# Patient Record
Sex: FEMALE
[De-identification: names, ages, dates, MRNs, and addresses within clinical notes are randomized per-mention and may not be internally consistent; named-entity substitution may affect disease eponyms.]

---

## 2021-09-25 ENCOUNTER — HOSPITAL ENCOUNTER (EMERGENCY)
Dept: HOSPITAL 97 - ER | Age: 51
Discharge: HOME | End: 2021-09-25
Payer: COMMERCIAL

## 2021-09-25 VITALS — SYSTOLIC BLOOD PRESSURE: 123 MMHG | OXYGEN SATURATION: 100 % | DIASTOLIC BLOOD PRESSURE: 60 MMHG

## 2021-09-25 VITALS — TEMPERATURE: 98.3 F

## 2021-09-25 DIAGNOSIS — Z20.822: ICD-10-CM

## 2021-09-25 DIAGNOSIS — J20.9: ICD-10-CM

## 2021-09-25 DIAGNOSIS — G44.209: Primary | ICD-10-CM

## 2021-09-25 LAB
ALBUMIN SERPL BCP-MCNC: 3.2 G/DL (ref 3.4–5)
ALP SERPL-CCNC: 106 U/L (ref 45–117)
ALT SERPL W P-5'-P-CCNC: 24 U/L (ref 12–78)
AST SERPL W P-5'-P-CCNC: 15 U/L (ref 15–37)
BUN BLD-MCNC: 10 MG/DL (ref 7–18)
GLUCOSE SERPLBLD-MCNC: 155 MG/DL (ref 74–106)
HCT VFR BLD CALC: 33.1 % (ref 36–45)
INR BLD: 1.08
LYMPHOCYTES # SPEC AUTO: 1.9 K/UL (ref 0.7–4.9)
MAGNESIUM SERPL-MCNC: 1.8 MG/DL (ref 1.8–2.4)
MORPHOLOGY BLD-IMP: (no result)
NT-PROBNP SERPL-MCNC: 720 PG/ML (ref ?–125)
PMV BLD: 7.9 FL (ref 7.6–11.3)
POTASSIUM SERPL-SCNC: 3.8 MMOL/L (ref 3.5–5.1)
RBC # BLD: 4.03 M/UL (ref 3.86–4.86)
TROPONIN (EMERG DEPT USE ONLY): < 0.02 NG/ML (ref 0–0.04)

## 2021-09-25 PROCEDURE — 83880 ASSAY OF NATRIURETIC PEPTIDE: CPT

## 2021-09-25 PROCEDURE — 96374 THER/PROPH/DIAG INJ IV PUSH: CPT

## 2021-09-25 PROCEDURE — 83735 ASSAY OF MAGNESIUM: CPT

## 2021-09-25 PROCEDURE — 85610 PROTHROMBIN TIME: CPT

## 2021-09-25 PROCEDURE — 70450 CT HEAD/BRAIN W/O DYE: CPT

## 2021-09-25 PROCEDURE — 99285 EMERGENCY DEPT VISIT HI MDM: CPT

## 2021-09-25 PROCEDURE — 84484 ASSAY OF TROPONIN QUANT: CPT

## 2021-09-25 PROCEDURE — 80048 BASIC METABOLIC PNL TOTAL CA: CPT

## 2021-09-25 PROCEDURE — 93005 ELECTROCARDIOGRAM TRACING: CPT

## 2021-09-25 PROCEDURE — 80076 HEPATIC FUNCTION PANEL: CPT

## 2021-09-25 PROCEDURE — 36415 COLL VENOUS BLD VENIPUNCTURE: CPT

## 2021-09-25 PROCEDURE — 96361 HYDRATE IV INFUSION ADD-ON: CPT

## 2021-09-25 PROCEDURE — 81003 URINALYSIS AUTO W/O SCOPE: CPT

## 2021-09-25 PROCEDURE — 71045 X-RAY EXAM CHEST 1 VIEW: CPT

## 2021-09-25 PROCEDURE — 85025 COMPLETE CBC W/AUTO DIFF WBC: CPT

## 2021-09-25 PROCEDURE — 96375 TX/PRO/DX INJ NEW DRUG ADDON: CPT

## 2021-09-25 NOTE — RAD REPORT
EXAM DESCRIPTION:  RAD - Chest Single View - 9/25/2021 12:42 pm

 

CLINICAL HISTORY:  MALAISE

Chest pain.

 

COMPARISON:  CHEST PA AND LAT 2 VIEW dated 6/3/2010; ABDOMEN ACUTE SERIES dated 4/7/2004

 

FINDINGS:  Portable technique limits examination quality.

 

Mild bilateral interstitial lung opacities are present likely representing a viral infection or bronc
hitis. The heart is normal in size. No displaced fractures.

## 2021-09-25 NOTE — RAD REPORT
EXAM DESCRIPTION:  CT - Head Brain Wo Cont - 9/25/2021 12:15 pm

 

CLINICAL HISTORY:  HEADACHE

Hypertension, headache

 

COMPARISON:  [facility] dated 6/7/2015

 

TECHNIQUE:  All CT scans are performed using dose optimization technique as appropriate and may inclu
de automated exposure control or mA/KV adjustment according to patient size.

 

FINDINGS:  No intracranial hemorrhage, hydrocephalus or extra-axial fluid collection.No areas of brai
n edema or evidence of midline shift.

 

The paranasal sinuses and mastoids are clear. The calvarium is intact.

 

IMPRESSION:  No acute intracranial abnormality.

## 2021-09-25 NOTE — ER
Nurse's Notes                                                                                     

 [facility]                                                                 

Name: Emmanuelle Cool                                                                              

Age: 51 yrs                                                                                       

Sex: Female                                                                                       

: 1970                                                                                   

MRN: [id number]                                                                                   

Arrival Date: 2021                                                                          

Time: 11:10                                                                                       

Account#: [id number]                                                                            

Bed 7                                                                                             

Private MD:                                                                                       

Diagnosis: Episodic tension-type headache;Acute bronchitis, unspecified                           

                                                                                                  

Presentation:                                                                                     

                                                                                             

11:21 Chief complaint: Patient states: BP has been elevated the past 4 days. Awoke today with ll1 

      bad HA. Coronavirus screen: Vaccine status: Patient reports being unvaccinated. Client      

      denies travel out of the [place] in the last 14 days. At this time, the client does not        

      indicate any symptoms associated with coronavirus-19. Ebola Screen: Patient denies          

      travel to an Ebola-affected area in the 21 days before illness onset. Initial Sepsis        

      Screen: Does the patient meet any 2 criteria? HR > 90 bpm. No. Patient's initial sepsis     

      screen is negative. Does the patient have a suspected source of infection? No.              

      Patient's initial sepsis screen is negative. Risk Assessment: Do you want to hurt           

      yourself or someone else? Patient reports no desire to harm self or others. Onset of        

      symptoms was 2021.                                                            

11:21 Method Of Arrival: Ambulatory                                                           ll1 

11:21 Acuity: PABLO 3                                                                           ll1 

                                                                                                  

Historical:                                                                                       

- Allergies:                                                                                      

11:21 No Known Drug Allergies;                                                                ll1 

- PMHx:                                                                                           

11:21 Hypertension;                                                                           ll1 

                                                                                                  

- Immunization history:: Client reports having NOT received the Covid vaccine. Flu                

  vaccine status is unknown.                                                                      

- Social history:: Smoking status: Patient denies any tobacco usage or history of.                

- Family history:: not pertinent.                                                                 

                                                                                                  

                                                                                                  

Screenin:40 Abuse screen: Denies threats or abuse. Denies injuries from another. Nutritional        sv  

      screening: No deficits noted. Tuberculosis screening: No symptoms or risk factors           

      identified. Fall Risk None identified.                                                      

                                                                                                  

Assessment:                                                                                       

11:40 General: Appears in no apparent distress. comfortable, well groomed, well developed,    sv  

      Behavior is calm, cooperative, appropriate for age. Pain: Complains of pain in face and     

      scalp Pain currently is 10 out of 10 on a pain scale. Neuro: Level of Consciousness is      

      awake, alert, obeys commands, Oriented to person, place, time, situation, Moves all         

      extremities. Full function Gait is steady, Speech is normal, Facial symmetry appears        

      normal, Reports headache. Neuro: Reports dizziness, only when getting up to a standing      

      position. Cardiovascular: Patient's skin is warm and dry. Rhythm is sinus rhythm.           

      Respiratory: Airway is patent Respiratory effort is even, unlabored, Respiratory            

      pattern is regular, symmetrical. Derm: Skin is pink, warm \T\ dry. Musculoskeletal: Range   

      of motion: intact in all extremities.                                                       

13:15 Reassessment: Patient appears in no apparent distress at this time. No changes from     sv  

      previously documented assessment. Patient and/or family updated on plan of care and         

      expected duration. Pain level reassessed. Patient is alert, oriented x 3, equal             

      unlabored respirations, skin warm/dry/pink.                                                 

14:01 Reassessment: Patient appears in no apparent distress at this time. Patient and/or      sv  

      family updated on plan of care and expected duration. Pain level reassessed. Patient is     

      alert, oriented x 3, equal unlabored respirations, skin warm/dry/pink. Patient states       

      symptoms have improved.                                                                     

                                                                                                  

Vital Signs:                                                                                      

11:21  / 91; Pulse 96; Resp 16; Temp 98.3; Pulse Ox 97% on R/A; Weight 65.77 kg; Height ll1 

      4 ft. 11 in. (149.86 cm); Pain 10/10;                                                       

13:15  / 60; Pulse 84; Resp 16; Pulse Ox 100% ;                                         sv  

11:21 Body Mass Index 29.29 (65.77 kg, 149.86 cm)                                             ll1 

                                                                                                  

ED Course:                                                                                        

11:10 Patient arrived in ED.                                                                  ds1 

11:20 Carmen Soni MD is Attending Physician.                                           ma2 

11:21 Arm band placed on Patient placed in an exam room, on a stretcher.                      ll1 

11:23 Triage completed.                                                                       ll1 

11:26 Mame Milner RN is Primary Nurse.                                                  sv  

11:40 Patient has correct armband on for positive identification. Bed in low position. Call   sv  

      light in reach. Cardiac monitor on. Pulse ox on. NIBP on. Door closed. Head of bed          

      elevated.                                                                                   

11:40 Inserted saline lock: 20 gauge in right antecubital area, using aseptic technique.      sv  

      ,using aseptic technique. done by Terernce MASSEY Blood collected.                                 

11:44 EKG done, by ED staff, reviewed by Carmen Soni MD.                                 sv  

12:15 CT Head Brain wo Cont In Process Unspecified.                                           EDMS

12:42 XRAY Chest (1 view) In Process Unspecified.                                             EDMS

14:01 No provider procedures requiring assistance completed. IV discontinued, intact,         sv  

      bleeding controlled, No redness/swelling at site. Pressure dressing applied.                

                                                                                                  

Administered Medications:                                                                         

11:48 Drug: NS 0.9% 1000 ml Route: IV; Rate: 1 bolus; Site: right forearm;                    sv  

13:13 Follow up: Response: No adverse reaction; IV Status: Completed infusion; IV Intake:     sv  

      1000ml                                                                                      

11:48 Drug: Reglan (metoCLOPramide) 20 mg Route: IVP; Site: right forearm;                    sv  

12:30 Follow up: Response: No adverse reaction                                                sv  

11:48 Drug: Ketorolac 30 mg Route: IVP; Site: right forearm;                                  sv  

12:30 Follow up: Response: No adverse reaction                                                sv  

14:01 Drug: AZITHromycin 500 mg Route: PO;                                                    sv  

14:01 Follow up: Response: Medication administered at discharge.                              sv  

                                                                                                  

                                                                                                  

Intake:                                                                                           

13:13 IV: 1000ml; Total: 1000ml.                                                              sv  

                                                                                                  

Outcome:                                                                                          

13:35 Discharge ordered by MD. barnett 

14:02 Discharged to home ambulatory.                                                          sv  

14:02 Condition: stable                                                                           

14:02 Discharge instructions given to patient, Instructed on discharge instructions, follow       

      up and referral plans. medication usage, Demonstrated understanding of instructions,        

      follow-up care, medications, Prescriptions given X 2.                                       

14:02 Patient left the ED.                                                                    sv  

                                                                                                  

Signatures:                                                                                       

Dispatcher [employer]                           Mame Jacome, RN                    RN   Ami Burnett                                ds1                                                  

Carmen Soni MD MD ma2 Lewis, Lynsay, RN RN   ll1                                                  

                                                                                                  

**************************************************************************************************

## 2021-09-25 NOTE — EDPHYS
Physician Documentation                                                                           

 [facility]                                                                 

Name: Emmanuelle Cool                                                                              

Age: 51 yrs                                                                                       

Sex: Female                                                                                       

: 1970                                                                                   

MRN: [id number]                                                                                   

Arrival Date: 2021                                                                          

Time: 11:10                                                                                       

Account#: [id number]                                                                            

Bed 7                                                                                             

Private MD:                                                                                       

ED Physician Carmen Soni                                                                    

HPI:                                                                                              

                                                                                             

11:32 This 51 yrs old  Female presents to ER via Ambulatory with complaints of Blood  ma2 

      Pressue Issue, Dizziness.                                                                   

11:32 The patient presents with mild headache. Onset: The symptoms/episode began/occurred     ma2 

      gradually, 1 day(s) ago. Associated signs and symptoms: Pertinent negatives: blurred        

      vision, confusion, focal weakness, head injury, numbness, palpitations, syncope.            

      Severity of symptoms: At their worst the symptoms were moderate in the emergency            

      department the symptoms are unchanged. The patient has experienced similar episodes in      

      the past.                                                                                   

                                                                                                  

Historical:                                                                                       

- Allergies:                                                                                      

11:21 No Known Drug Allergies;                                                                ll1 

- PMHx:                                                                                           

11:21 Hypertension;                                                                           ll1 

                                                                                                  

- Immunization history:: Client reports having NOT received the Covid vaccine. Flu                

  vaccine status is unknown.                                                                      

- Social history:: Smoking status: Patient denies any tobacco usage or history of.                

- Family history:: not pertinent.                                                                 

                                                                                                  

                                                                                                  

ROS:                                                                                              

11:32 Constitutional: Negative for fever, chills, and weight loss.                            ma2 

11:32 All other systems are negative.                                                             

                                                                                                  

Exam:                                                                                             

11:32 Constitutional:  This is a well developed, well nourished patient who is awake, alert,  ma2 

      and in no acute distress. Head/Face:  Normocephalic, atraumatic. Eyes:  Pupils equal        

      round and reactive to light, extra-ocular motions intact.  Lids and lashes normal.          

      Conjunctiva and sclera are non-icteric and not injected.  Cornea within normal limits.      

      Periorbital areas with no swelling, redness, or edema. ENT:  Nares patent. No nasal         

      discharge, no septal abnormalities noted.  Tympanic membranes are normal and external       

      auditory canals are clear.  Oropharynx with no redness, swelling, or masses, exudates,      

      or evidence of obstruction, uvula midline.  Mucous membranes moist. Neck:  Trachea          

      midline, no thyromegaly or masses palpated, and no cervical lymphadenopathy.  Supple,       

      full range of motion without nuchal rigidity, or vertebral point tenderness.  No            

      Meningismus. Chest/axilla:  Normal chest wall appearance and motion.  Nontender with no     

      deformity.  No lesions are appreciated. Cardiovascular:  Regular rate and rhythm with a     

      normal S1 and S2.  No gallops, murmurs, or rubs.  Normal PMI, no JVD.  No pulse             

      deficits. Respiratory:  Lungs have equal breath sounds bilaterally, clear to                

      auscultation and percussion.  No rales, rhonchi or wheezes noted.  No increased work of     

      breathing, no retractions or nasal flaring. Abdomen/GI:  Soft, non-tender, with normal      

      bowel sounds.  No distension or tympany.  No guarding or rebound.  No evidence of           

      tenderness throughout. Back:  No spinal tenderness.  No costovertebral tenderness.          

      Full range of motion. MS/ Extremity:  Pulses equal, no cyanosis.  Neurovascular intact.     

       Full, normal range of motion. Neuro:  Awake and alert, GCS 15, oriented to person,         

      place, time, and situation.  Cranial nerves II-XII grossly intact.  Motor strength 5/5      

      in all extremities.  Sensory grossly intact.  Cerebellar exam normal.  Normal gait.         

                                                                                                  

Vital Signs:                                                                                      

11:21  / 91; Pulse 96; Resp 16; Temp 98.3; Pulse Ox 97% on R/A; Weight 65.77 kg; Height ll1 

      4 ft. 11 in. (149.86 cm); Pain 10/10;                                                       

13:15  / 60; Pulse 84; Resp 16; Pulse Ox 100% ;                                         sv  

11:21 Body Mass Index 29.29 (65.77 kg, 149.86 cm)                                             ll1 

                                                                                                  

MDM:                                                                                              

11:20 Patient medically screened.                                                             ma2 

11:32 Differential diagnosis: generalized weakness, hyperventilation, hypovolemia, idiopathic ma2 

      dizziness, near-syncope, vertigo.                                                           

13:35 Data reviewed: vital signs, nurses notes. Counseling: I had a detailed discussion with  ma2 

      the patient and/or guardian regarding: the historical points, exam findings, and any        

      diagnostic results supporting the discharge/admit diagnosis, the presence of at least       

      one elevated blood pressure reading (>120/80) during this emergency department visit,       

      the need for outpatient follow up. Response to treatment: the patient's symptoms have       

      markedly improved after treatment.                                                          

                                                                                                  

                                                                                             

11:31 Order name: Basic Metabolic Panel; Complete Time: 12:24                                 [facility] 

                                                                                             

11:31 Order name: CBC with Diff; Complete Time: 13:03                                         [facility] 

                                                                                             

11:31 Order name: LFT's; Complete Time: 12:24                                                 [facility] 

                                                                                             

11:31 Order name: Magnesium; Complete Time: 12:24                                             [facility] 

                                                                                             

11:31 Order name: NT PRO-BNP; Complete Time: 12:24                                            [facility] 

                                                                                             

11:31 Order name: PT-INR; Complete Time: 12:24                                                [facility] 

                                                                                             

11:31 Order name: CT Head Brain wo Cont; Complete Time: 13:03                                 [facility] 

                                                                                             

11:31 Order name: Troponin (emerg Dept Use Only); Complete Time: 12:24                        [facility] 

                                                                                             

11:31 Order name: XRAY Chest (1 view); Complete Time: 13:03                                   [facility] 

                                                                                             

12:47 Order name: Manual Differential; Complete Time: 13:03                                   [facility]

                                                                                             

12:59 Order name: SARS-COV-2 RT PCR; Complete Time: 13:03                                     [facility]

                                                                                             

13:12 Order name: Urine Dipstick-Ancillary                                                    [facility]

                                                                                             

11:31 Order name: EKG; Complete Time: 11:32                                                   [facility] 

                                                                                             

11:31 Order name: Cardiac monitoring; Complete Time: 11:48                                    [facility] 

                                                                                             

11:31 Order name: EKG - Nurse/Tech; Complete Time: 11:48                                      [facility] 

                                                                                             

11:31 Order name: IV Saline Lock; Complete Time: 11:48                                        [facility] 

                                                                                             

11:31 Order name: Labs collected and sent; Complete Time: 11:48                               [facility] 

                                                                                             

11:31 Order name: O2 Per Protocol; Complete Time: 11:49                                       [facility] 

                                                                                             

11:31 Order name: O2 Sat Monitoring; Complete Time: 11:49                                     [facility] 

                                                                                             

11:31 Order name: Urine Dipstick-Ancillary (obtain specimen); Complete Time: 13:13            ma 

                                                                                                  

Administered Medications:                                                                         

11:48 Drug: NS 0.9% 1000 ml Route: IV; Rate: 1 bolus; Site: right forearm;                    sv  

13:13 Follow up: Response: No adverse reaction; IV Status: Completed infusion; IV Intake:     sv  

      1000ml                                                                                      

11:48 Drug: Reglan (metoCLOPramide) 20 mg Route: IVP; Site: right forearm;                    sv  

12:30 Follow up: Response: No adverse reaction                                                sv  

11:48 Drug: Ketorolac 30 mg Route: IVP; Site: right forearm;                                  sv  

12:30 Follow up: Response: No adverse reaction                                                sv  

14:01 Drug: AZITHromycin 500 mg Route: PO;                                                    sv  

14:01 Follow up: Response: Medication administered at discharge.                              sv  

                                                                                                  

                                                                                                  

Disposition Summary:                                                                              

21 13:35                                                                                    

Discharge Ordered                                                                                 

      Location: Home                                                                          ma2 

      Condition: Stable                                                                       ma2 

      Diagnosis                                                                                   

        - Episodic tension-type headache                                                      ma2 

        - Acute bronchitis, unspecified                                                       ma2 

      Followup:                                                                               ma2 

        - With: Private Physician                                                                  

        - When: Tomorrow                                                                           

        - Reason: Continuance of care                                                              

      Discharge Instructions:                                                                     

        - Discharge Summary Sheet                                                             ma2 

        - Acute Bronchitis, Adult                                                             ma2 

      Forms:                                                                                      

        - Medication Reconciliation Form                                                      ma2 

        - Thank You Letter                                                                    ma2 

        - Antibiotic Education                                                                ma2 

        - Prescription Opioid Use                                                             ma2 

      Prescriptions:                                                                              

        - Reglan 10 mg Oral Tablet                                                                 

            - take 1 tablet by ORAL route every 6 hours . take 30 minutes before meals and at ma2 

      bedtime; 100 tablet; Refills: 0, Product Selection Permitted                                

        - Zithromax Z-Venancio 250 mg Oral Tablet                                                       

            - take 1 tablet by ORAL route as directed for 5 days Day 1 - take two (2) tablets ma2 

      one time.  Day 2, 3, 4 , 5 take one (1) tablet once daily.; 6 tablet; Refills: 0,           

      Product Selection Permitted                                                                 

Signatures:                                                                                       

Dispatcher [employer]                           Mame Jacome RN                    Carmen De La Rosa MD MD   ma2                                                  

Edison Murcia RN                       RN   ll1                                                  

                                                                                                  

Corrections: (The following items were deleted from the chart)                                    

12:05 11:32 CORONAVIRUS+MR.LAB.BRZ ordered. EDMS                                              EDMS

                                                                                                  

**************************************************************************************************

## 2021-10-03 ENCOUNTER — HOSPITAL ENCOUNTER (EMERGENCY)
Dept: HOSPITAL 97 - ER | Age: 51
Discharge: HOME | End: 2021-10-03
Payer: COMMERCIAL

## 2021-10-03 VITALS — SYSTOLIC BLOOD PRESSURE: 126 MMHG | OXYGEN SATURATION: 100 % | DIASTOLIC BLOOD PRESSURE: 68 MMHG | TEMPERATURE: 96.8 F

## 2021-10-03 DIAGNOSIS — F41.9: Primary | ICD-10-CM

## 2021-10-03 LAB
ALBUMIN SERPL BCP-MCNC: 3.3 G/DL (ref 3.4–5)
ALP SERPL-CCNC: 102 U/L (ref 45–117)
ALT SERPL W P-5'-P-CCNC: 27 U/L (ref 12–78)
AST SERPL W P-5'-P-CCNC: 15 U/L (ref 15–37)
BUN BLD-MCNC: 15 MG/DL (ref 7–18)
GLUCOSE SERPLBLD-MCNC: 114 MG/DL (ref 74–106)
HCT VFR BLD CALC: 32.5 % (ref 36–45)
INR BLD: 1.09
LYMPHOCYTES # SPEC AUTO: 2.5 K/UL (ref 0.7–4.9)
MAGNESIUM SERPL-MCNC: 2 MG/DL (ref 1.8–2.4)
METHAMPHET UR QL SCN: NEGATIVE
NT-PROBNP SERPL-MCNC: 266 PG/ML (ref ?–125)
PMV BLD: 8 FL (ref 7.6–11.3)
POTASSIUM SERPL-SCNC: 3.5 MMOL/L (ref 3.5–5.1)
RBC # BLD: 3.98 M/UL (ref 3.86–4.86)
THC SERPL-MCNC: NEGATIVE NG/ML
TROPONIN (EMERG DEPT USE ONLY): < 0.02 NG/ML (ref 0–0.04)

## 2021-10-03 PROCEDURE — 36415 COLL VENOUS BLD VENIPUNCTURE: CPT

## 2021-10-03 PROCEDURE — 83880 ASSAY OF NATRIURETIC PEPTIDE: CPT

## 2021-10-03 PROCEDURE — 70450 CT HEAD/BRAIN W/O DYE: CPT

## 2021-10-03 PROCEDURE — 80307 DRUG TEST PRSMV CHEM ANLYZR: CPT

## 2021-10-03 PROCEDURE — 99284 EMERGENCY DEPT VISIT MOD MDM: CPT

## 2021-10-03 PROCEDURE — 83735 ASSAY OF MAGNESIUM: CPT

## 2021-10-03 PROCEDURE — 80076 HEPATIC FUNCTION PANEL: CPT

## 2021-10-03 PROCEDURE — 81003 URINALYSIS AUTO W/O SCOPE: CPT

## 2021-10-03 PROCEDURE — 84484 ASSAY OF TROPONIN QUANT: CPT

## 2021-10-03 PROCEDURE — 82550 ASSAY OF CK (CPK): CPT

## 2021-10-03 PROCEDURE — 96375 TX/PRO/DX INJ NEW DRUG ADDON: CPT

## 2021-10-03 PROCEDURE — 71045 X-RAY EXAM CHEST 1 VIEW: CPT

## 2021-10-03 PROCEDURE — 96374 THER/PROPH/DIAG INJ IV PUSH: CPT

## 2021-10-03 PROCEDURE — 80048 BASIC METABOLIC PNL TOTAL CA: CPT

## 2021-10-03 PROCEDURE — 85025 COMPLETE CBC W/AUTO DIFF WBC: CPT

## 2021-10-03 PROCEDURE — 85610 PROTHROMBIN TIME: CPT

## 2021-10-03 PROCEDURE — 93005 ELECTROCARDIOGRAM TRACING: CPT

## 2021-10-03 NOTE — RAD REPORT
EXAM DESCRIPTION:  RAD - Chest Single View - 10/3/2021 6:30 pm

 

CLINICAL HISTORY:  dizziness

 

COMPARISON:  Chest Single View dated 9/25/2021; CHEST PA AND LAT 2 VIEW dated 6/3/2010; ABDOMEN ACUTE
 SERIES dated 4/7/2004

 

FINDINGS:  Lines: None.

Lungs: No evidence of edema or pneumonia.

Pleural: No significant pleural effusions or pneumothorax.

Cardiac: The heart size is within normal limits.

Bones: No acute fractures.

Other:

 

IMPRESSION:  No acute cardiopulmonary disease.

## 2021-10-03 NOTE — ER
Nurse's Notes                                                                                     

 [facility]                                                                 

Name: Emmanuelle Cool                                                                              

Age: 51 yrs                                                                                       

Sex: Female                                                                                       

: 1970                                                                                   

MRN: [id number]                                                                                   

Arrival Date: 10/03/2021                                                                          

Time: 17:59                                                                                       

Account#: [id number]                                                                            

Bed 15                                                                                            

Private MD:                                                                                       

Diagnosis: Anxiety disorder, unspecified                                                          

                                                                                                  

Presentation:                                                                                     

10/03                                                                                             

18:15 Chief complaint: Patient states: Reports episode of anxiety while washing her car.      aj2 

      Reports hot flash prior to feeling anxiety.. Coronavirus screen: Vaccine status:            

      Patient reports being unvaccinated. Ebola Screen: No symptoms or risks identified at        

      this time. Initial Sepsis Screen: Does the patient meet any 2 criteria?. Risk               

      Assessment: Do you want to hurt yourself or someone else?. Onset of symptoms was            

      October 3, 2021.                                                                            

18:15 Method Of Arrival: EMS: [facility]                                                       aj2 

18:15 Acuity: PABLO 2                                                                           aj2 

22:41 Initial Sepsis Screen: Does the patient have a suspected source of infection? No.       lh3 

      Patient's initial sepsis screen is negative.                                                

                                                                                                  

Triage Assessment:                                                                                

18:18 General: Appears in no apparent distress. comfortable, Behavior is calm, cooperative.   aj2 

      Pain: Denies pain.                                                                          

                                                                                                  

OB/GYN:                                                                                           

22:41 LMP N/A - Irregular menses                                                              lh3 

                                                                                                  

Historical:                                                                                       

- PMHx:                                                                                           

18:18 Hypertension;                                                                           aj2 

                                                                                                  

- Immunization history:: Adult Immunizations not up to date.                                      

- Social history:: Smoking status: unknown.                                                       

                                                                                                  

                                                                                                  

Screenin:49 Abuse screen: Denies threats or abuse. Denies injuries from another. Nutritional        aj2 

      screening: No deficits noted. Tuberculosis screening: No symptoms or risk factors           

      identified. Fall Risk None identified.                                                      

                                                                                                  

Assessment:                                                                                       

18:49 Reassessment: Patient appears in no apparent distress at this time. Patient and/or      aj2 

      family updated on plan of care and expected duration. Pain level reassessed. Patient is     

      alert, oriented x 3, equal unlabored respirations, skin warm/dry/pink.                      

                                                                                                  

Vital Signs:                                                                                      

18:46  / 68; Pulse 87; Resp 18; Temp 96.8; Pulse Ox 100% ;                              aj2 

                                                                                                  

ED Course:                                                                                        

17:59 Patient arrived in ED.                                                                  em1 

18:04 Aubrey Flanagan PA is PHCP.                                                                cp  

18:09 Aubrey Champion MD is Attending Physician.                                             pallavi 

18:15 Kip Cassidy is Primary Nurse.                                                      aj2 

18:18 Triage completed.                                                                       aj2 

18:18 Arm band placed on right wrist.                                                         aj2 

18:30 XRAY Chest (1 view) In Process Unspecified.                                             EDMS

18:31 CT Head Brain wo Cont In Process Unspecified.                                           EDMS

18:49 No apparent distress. Resting quietly.                                                  aj2 

18:49 Patient has correct armband on for positive identification.                             aj2 

18:49 No provider procedures requiring assistance completed. IV is patent, is intact.         aj2 

18:59 CBC with Diff Sent.                                                                     aj2 

18:59 LFT's Sent.                                                                             aj2 

18:59 Magnesium Sent.                                                                         aj2 

18:59 NT PRO-BNP Sent.                                                                        aj2 

18:59 PT-INR Sent.                                                                            aj2 

18:59 Troponin (emerg Dept Use Only) Sent.                                                    aj2 

19:00 Basic Metabolic Panel Sent.                                                             aj2 

22:41 IV discontinued, bleeding controlled, No redness/swelling at site. Pressure dressing    lh3 

      applied.                                                                                    

                                                                                                  

Administered Medications:                                                                         

19:00 Drug: NS 0.9% 1000 ml Route: IV; Rate: 1 bolus; Site: left antecubital;                 aj2 

19:16 Drug: Ativan (LORazepam) 0.5 mg Route: IVP; Site: left antecubital;                     aj2 

20:05 Follow up: Response: No adverse reaction                                                lh3 

19:16 Drug: Zofran (Ondansetron) 4 mg Route: IVP; Site: left antecubital;                     aj2 

20:05 Follow up: Response: No adverse reaction                                                lh3 

21:10 Follow up: Response: No adverse reaction                                                3 

                                                                                                  

                                                                                                  

Outcome:                                                                                          

22:13 Discharge ordered by MD.                                                                maxx  

22:41 Discharged to home ambulatory.                                                          3 

22:41 Condition: good                                                                             

22:41 Discharge instructions given to patient, Instructed on discharge instructions,              

      medication usage, Demonstrated understanding of instructions, Prescriptions given X 1.      

22:42 Patient left the ED.                                                                    3 

                                                                                                  

Signatures:                                                                                       

Dispatcher [employer]                           EDMS                                                 

Aubrey Champion MD MD cha Martinez, Eric                               em1                                                  

Aubrey Flanagan PA PA cp Hardee, Latisha, RN                     RN   3                                                  

Kip Cassidy                             aj2                                                  

                                                                                                  

**************************************************************************************************

## 2021-10-03 NOTE — EDPHYS
Physician Documentation                                                                           

 [facility]                                                                 

Name: Emmanuelle Cool                                                                              

Age: 51 yrs                                                                                       

Sex: Female                                                                                       

: 1970                                                                                   

MRN: [id number]                                                                                   

Arrival Date: 10/03/2021                                                                          

Time: 17:59                                                                                       

Account#: [id number]                                                                            

Bed 15                                                                                            

Private MD:                                                                                       

ED Physician Aubrey Champion                                                                      

HPI:                                                                                              

10/03                                                                                             

18:20 This 51 yrs old  Female presents to ER via EMS with complaints of Anxiety.      cp  

18:20 The patient presents with lightheadedness. Onset: The symptoms/episode began/occurred   cp  

      today.                                                                                      

18:20 Associated signs and symptoms: Pertinent positives: general weakness, Pertinent         cp  

      negatives: abdominal pain, chest pain, focal weakness, near-syncope, numbness, syncope.     

      Patient's baseline: Neuro: alert and fully oriented, Motor: no deficits, Ambulation:        

      walks without assistance, Speech: normal. Patient reports she was outside washing her       

      car at local [employer] when she started becoming lightheaded, weak all over. Patient          

      reports history of anxiety.                                                                 

                                                                                                  

OB/GYN:                                                                                           

22:41 LMP N/A - Irregular menses                                                              lh3 

                                                                                                  

Historical:                                                                                       

- PMHx:                                                                                           

18:18 Hypertension;                                                                           aj2 

                                                                                                  

- Immunization history:: Adult Immunizations not up to date.                                      

- Social history:: Smoking status: unknown.                                                       

                                                                                                  

                                                                                                  

ROS:                                                                                              

18:25 Constitutional: Negative for body aches, chills, fever, poor PO intake.                 cp  

18:25 Eyes: Negative for injury, pain, redness, and discharge.                                cp  

18:25 ENT: Negative for ear pain, sore throat, difficulty swallowing, difficulty handling         

      secretions.                                                                                 

18:25 Cardiovascular: Negative for chest pain, edema, palpitations.                               

18:25 Respiratory: Negative for cough, shortness of breath, wheezing.                             

18:25 Abdomen/GI: Negative for abdominal pain, vomiting, diarrhea, constipation.                  

18:25 Neuro: Positive for dizziness, weakness all over, Negative for altered mental status,       

      headache, loss of consciousness, numbness, syncope.                                         

18:25 Psych: Positive for anxiety.                                                                

18:25 All other systems are negative.                                                             

                                                                                                  

Exam:                                                                                             

18:30 Constitutional: The patient appears in no acute distress, alert, awake,                 cp  

      non-diaphoretic, non-toxic, well developed, well nourished, anxious.                        

18:30 Head/Face:  Normocephalic, atraumatic.                                                  cp  

18:30 Eyes: Periorbital structures: appear normal, Pupils: equal, round, and reactive to          

      light and accomodation, Extraocular movements: intact throughout, Conjunctiva: normal,      

      no exudate, no injection, Sclera: no appreciated abnormality, Lids and lashes: appear       

      normal, bilaterally.                                                                        

18:30 ENT: External ear(s): are unremarkable, Ear canal(s): are normal, clear, TM's:              

      dullness, bilaterally, Nose: is normal, Mouth: Lips: moist, Oral mucosa: pink and           

      intact, moist, Posterior pharynx: Airway: no evidence of obstruction, patent.               

18:30 Neck: ROM/movement: is normal, is supple, without pain, no range of motions                 

      limitations, no nuchal rigidity.                                                            

18:30 Chest/axilla: Inspection: normal, Palpation: is normal, no crepitus, no tenderness.         

18:30 Cardiovascular: Rate: normal, Rhythm: regular, Heart sounds: murmur, not appreciated,       

      Edema: is not appreciated, JVD: is not appreciated.                                         

18:30 Respiratory: the patient does not display signs of respiratory distress,  Respirations:     

      normal, no use of accessory muscles, no retractions, labored breathing, is not present,     

      Breath sounds: are clear throughout, no decreased breath sounds, no stridor, no             

      wheezing.                                                                                   

18:30 Abdomen/GI: Inspection: abdomen appears normal, Palpation: abdomen is soft and              

      non-tender, in all quadrants.                                                               

18:30 Back: pain, is absent, ROM is normal.                                                       

18:30 Neuro: Orientation: to person, place \T\ time. Mentation: is normal, Cerebellar function:   

      is grossly normal, Motor: moves all fours, strength is normal, Sensation: is normal.        

20:37 ECG was reviewed by the Attending Physician.                                            cp  

                                                                                                  

Vital Signs:                                                                                      

18:46  / 68; Pulse 87; Resp 18; Temp 96.8; Pulse Ox 100% ;                              aj2 

                                                                                                  

MDM:                                                                                              

18:09 Patient medically screened.                                                             pallavi 

18:30 Differential diagnosis: hypovolemia, TIA, dehydration, anxiety.                         cp  

22:10 Data reviewed: vital signs, nurses notes, lab test result(s), EKG, radiologic studies,  cp  

      CT scan, plain films.                                                                       

22:10 Test interpretation: by ED physician or midlevel provider: ECG, plain radiologic        cp  

      studies.                                                                                    

22:13 Counseling: I had a detailed discussion with the patient and/or guardian regarding: the cp  

      historical points, exam findings, and any diagnostic results supporting the                 

      discharge/admit diagnosis, lab results, radiology results, to return to the emergency       

      department if symptoms worsen or persist or if there are any questions or concerns that     

      arise at home.                                                                              

22:13 Response to treatment: the patient's symptoms have markedly improved after treatment,   cp  

      VSS. Patient reports symptoms markedly improved. Will discharge to home for continued       

      monitoring.                                                                                 

                                                                                                  

10/03                                                                                             

18:11 Order name: Basic Metabolic Panel                                                       cp  

10/03                                                                                             

18:11 Order name: CBC with Diff; Complete Time: 19:36                                         cp  

10/03                                                                                             

19:36 Interpretation: Normal except: HGB 10.6; HCT 32.5; MCH 26.7; MN% 13.6.                  cp  

10/03                                                                                             

18:11 Order name: LFT's; Complete Time: 19:36                                                 cp  

10/03                                                                                             

19:37 Interpretation: Normal except: ALB 3.3; GLOB 3.7; A/G 0.9.                              cp  

10/03                                                                                             

18:11 Order name: Magnesium; Complete Time: 19:36                                             cp  

10/03                                                                                             

18:11 Order name: NT PRO-BNP; Complete Time: 19:36                                            cp  

10/03                                                                                             

18:11 Order name: PT-INR; Complete Time: 19:36                                                cp  

10/03                                                                                             

20:09 Interpretation: Abnormal: PT 12.6.                                                      cp  

10/03                                                                                             

18:11 Order name: Troponin (emerg Dept Use Only); Complete Time: 19:36                        cp  

10/03                                                                                             

18:11 Order name: XRAY Chest (1 view); Complete Time: 19:36                                   cp  

10/03                                                                                             

18:11 Order name: CT Head Brain wo Cont; Complete Time: 18:48                                 cp  

10/03                                                                                             

18:11 Order name: CK; Complete Time: 19:36                                                    cp  

10/03                                                                                             

18:11 Order name: UDS; Complete Time: 22:05                                                   cp  

10/03                                                                                             

18:12 Order name: Basic Metabolic Panel; Complete Time: 19:36                                 EDMS

10/03                                                                                             

19:37 Interpretation: Normal except: ; GLUC 114; CRE 0.38.                              cp  

10/03                                                                                             

20:49 Order name: Urine Dipstick-Ancillary; Complete Time: 22:05                              EDMS

10/03                                                                                             

18:11 Order name: EKG; Complete Time: 18:13                                                   cp  

10/03                                                                                             

18:11 Order name: Cardiac monitoring; Complete Time: 19:00                                    cp  

10/03                                                                                             

18:11 Order name: EKG - Nurse/Tech; Complete Time: 20:46                                      cp  

10/03                                                                                             

18:11 Order name: IV Saline Lock; Complete Time: 18:59                                        cp  

10/03                                                                                             

18:11 Order name: Labs collected and sent; Complete Time: 18:59                               cp  

10/03                                                                                             

18:11 Order name: O2 Per Protocol; Complete Time: 18:59                                       cp  

10/03                                                                                             

18:11 Order name: O2 Sat Monitoring; Complete Time: 18:59                                     cp  

10/03                                                                                             

18:11 Order name: Urine Dipstick-Ancillary (obtain specimen); Complete Time: 21:10            cp  

10/03                                                                                             

18:11 Order name: Urine Pregnancy Test (obtain specimen); Complete Time: 21:11                cp  

                                                                                                  

EC:37 Rate is 87 beats/min. Rhythm is regular. [place] interval is normal. QRS interval is normal. cp  

      QT interval is normal. T waves are Inverted in lead aVR. Interpreted by me. Reviewed by     

      me.                                                                                         

                                                                                                  

Administered Medications:                                                                         

19:00 Drug: NS 0.9% 1000 ml Route: IV; Rate: 1 bolus; Site: left antecubital;                 aj2 

19:16 Drug: Ativan (LORazepam) 0.5 mg Route: IVP; Site: left antecubital;                     aj2 

20:05 Follow up: Response: No adverse reaction                                                lh3 

19:16 Drug: Zofran (Ondansetron) 4 mg Route: IVP; Site: left antecubital;                     aj2 

20:05 Follow up: Response: No adverse reaction                                                lh3 

21:10 Follow up: Response: No adverse reaction                                                lh3 

                                                                                                  

                                                                                                  

Disposition:                                                                                      

10/04                                                                                             

06:41 Co-signature as Attending Physician, Aubrey Champion MD I agree with the assessment and  pallavi 

      plan of care.                                                                               

                                                                                                  

Disposition Summary:                                                                              

10/03/21 22:13                                                                                    

Discharge Ordered                                                                                 

      Location: Home                                                                          cp  

      Problem: an acute exacerbation                                                          cp  

      Symptoms: have improved                                                                 cp  

      Condition: Stable                                                                       cp  

      Diagnosis                                                                                   

        - Anxiety disorder, unspecified                                                       cp  

      Followup:                                                                               cp  

        - With: Private Physician                                                                  

        - When: 1 - 2 days                                                                         

        - Reason: Recheck today's complaints                                                       

      Discharge Instructions:                                                                     

        - Discharge Summary Sheet                                                             cp  

        - Generalized Anxiety Disorder, Adult                                                 cp  

        - Form - Return To Work                                                               lh3 

      Forms:                                                                                      

        - Medication Reconciliation Form                                                      cp  

        - Thank You Letter                                                                    cp  

        - Antibiotic Education                                                                cp  

        - Prescription Opioid Use                                                             cp  

      Prescriptions:                                                                              

        - Vistaril 50 mg Oral capsule                                                              

            - take 1 capsule by ORAL route 4 times per day; 20 capsule; Refills: 0, Product   cp  

      Selection Permitted                                                                         

Signatures:                                                                                       

Dispatcher [employer]                           Aubrey Paulino MD MD cha Page, Corey, PA PA cp Hardee, Latisha, RN                     RN   3                                                  

Kip Cassidy                             [facility]                                                  

                                                                                                  

**************************************************************************************************

## 2021-10-03 NOTE — RAD REPORT
EXAM DESCRIPTION:  CT - Head Brain Wo Cont - 10/3/2021 6:31 pm

 

CLINICAL HISTORY:  DIZZINESS

 

COMPARISON:  <Comparisons>

 

TECHNIQUE:  All CT scans are performed using dose optimization technique as appropriate and may inclu
de automated exposure control or mA/KV adjustment according to patient size.

 

FINDINGS:  No intracranial hemorrhage, hydrocephalus or extra-axial fluid collection.No areas of brai
n edema or evidence of midline shift.

 

The paranasal sinuses and mastoids are clear. The calvarium is intact.

 

IMPRESSION:  No acute intracranial abnormality.

## 2021-12-08 ENCOUNTER — HOSPITAL ENCOUNTER (EMERGENCY)
Dept: HOSPITAL 97 - ER | Age: 51
Discharge: LEFT BEFORE DISCHARGE | End: 2021-12-08
Payer: COMMERCIAL

## 2021-12-08 VITALS — DIASTOLIC BLOOD PRESSURE: 99 MMHG | TEMPERATURE: 98.4 F | OXYGEN SATURATION: 98 % | SYSTOLIC BLOOD PRESSURE: 155 MMHG

## 2021-12-08 DIAGNOSIS — Z53.21: Primary | ICD-10-CM

## 2021-12-08 LAB — SP GR UR: 1.02 (ref 1–1.03)

## 2021-12-08 PROCEDURE — 81025 URINE PREGNANCY TEST: CPT

## 2021-12-08 PROCEDURE — 99281 EMR DPT VST MAYX REQ PHY/QHP: CPT

## 2021-12-08 PROCEDURE — 81003 URINALYSIS AUTO W/O SCOPE: CPT

## 2021-12-08 NOTE — XMS REPORT
Continuity of Care Document

                           Created on:2021



Patient:FERNANDO COOL

Sex:Female

:1970

External Reference #:[id number]





Demographics







                          Address                   [address] 

[address]



                                                    [place] [address]

 

                          Home Phone                [phone]

 

                          Work Phone                [phone]

 

                          Mobile Phone              [phone]

 

                          Email Address             [email or web address]

 

                          Preferred Language        en

 

                          Marital Status            Unknown

 

                          Pentecostal Affiliation     Unknown

 

                          Race                      Unknown

 

                          Additional Race(s)        White

 

                          Ethnic Group              Unknown









Author







                          Organization              [facility]

t

 

                          Address                   [facility]



                                                    [place] [address]

 

                          Phone                     [phone]









Support







                Name            Relationship    Address         Phone

 

                Tri Cool  Child           Unavailable     [phone]









Care Team Providers







                    Name                Role                Phone

 

                    Pcp, Patient Does Not Have A Primary Care Physician +[phone]



 

                    Gena MASSEY,  L  Attending Clinician Unavailable

 

                    MICHEAL  K.H.       Attending Clinician Unavailable

 

                    Micheal MALAGON,  K.H.    Attending Clinician [phone]

 

                    UNKNOWN             Attending Clinician Unavailable









Payers







           Payer Name Policy Type Policy Number Effective Date Expiration Date S

ource







Problems







       Condition Condition Condition Status Onset  Resolution Last   Treating Co

mments 

Source



       Name   Details Category        Date   Date   Treatment Clinician        



                                                 Date                 

 

       No known No known Disease                                           [facility]

rs



       active active                                                  [facility]



       problems problems                                                  [facility]







Allergies, Adverse Reactions, Alerts







       Allergy Allergy Status Severity Reaction(s) Onset  Inactive Treating Comm

ents 

Source



       Name   Type                        Date   Date   Clinician        

 

       NO KNOWN Drug   Active                                           [facility]



       ALLERGIE Class                                                   [facility]



       S                                                              [facility]







Social History







           Social Habit Start Date Stop Date  Quantity   Comments   Source

 

           Exposure to                       Not sure              [facility]

 [place]



           SARS-CoV-2 (event)                                             [facility]

[facility]

 

           Tobacco use and 2021 Never used            [facility]



           exposure   00:00:00   00:00:00                         [facility]

 

           Sex Assigned At 1970                       [facility]



           Birth      00:00:00   00:00:00                         [facility]









                Smoking Status  Start Date      Stop Date       Source

 

                Never smoker                                    [facility]







Medications







       Ordered Filled Start  Stop   Current Ordering Indication Dosage Frequency

 Signature

                    Comments            Components          Source



     Medication Medication Date Date Medication? Clinician                (SIG) 

          



     Name Name                                                   

 

     lisinopriL            Yes            10mg      Take 10 mg           U

nivers



     10 mg      9-23                               by mouth           [facility]



     tablet      00:00:                               daily.           [place]



                                                               [facility]



                                                                 Branch

 

     lisinopriL      -0      Yes            10mg      Take 10 mg           U

nivers



     10 mg      9-23                               by mouth           [facility]



     tablet      00:00:                               daily.           [place]



                                                               [facility]



                                                                 Branch

 

     lisinopriL      -0      Yes            10mg      Take 10 mg           U

nivers



     10 mg      9-23                               by mouth           ity of



     tablet      00:00:                               daily.           [place]



                                                               [facility]



                                                                 Branch

 

     atomoxetine      -0      Yes            40mg      Take 40 mg           

[facility]



     40 mg      7-06                               by mouth           [facility]



     capsule      00:00:                               daily.           [place]



                                                               [facility]



                                                                 Branch

 

     escitalopra      -0      Yes            10mg      Take 10 mg           

[facility]



     m oxalate      7-06                               by mouth 2           ity 

of



     10 mg      00:00:                               (two)           [place]



     tablet      00                                 times           [facility]



                                                  daily.           Branch

 

     atomoxetine      -0      Yes            40mg      Take 40 mg           

[facility]



     40 mg      7-06                               by mouth           ity of



     capsule      00:00:                               daily.           [place]



                                                               [facility]

 

     escitalopra      -0      Yes            10mg      Take 10 mg           

[facility]



     m oxalate      7-06                               by mouth 2           ity 

of



     10 mg      00:00:                               (two)           [place]



     tablet      00                                 times           [facility]



                                                  daily.           Branch

 

     atomoxetine      -0      Yes            40mg      Take 40 mg           

[facility]



     40 mg      7-06                               by mouth           ity of



     capsule      00:00:                               daily.           [place]



                                                               [facility]



                                                                 Branch

 

     escitalopra      -0      Yes            10mg      Take 10 mg           

[facility]



     m oxalate      7-06                               by mouth 2           ity 

of



     10 mg      00:00:                               (two)           [place]



     tablet      00                                 times           [facility]



                                                  daily.           Branch

 

     albuterol      2021- No        [id number] 2{puff}      Inhale 2       

    [facility]



     90        1-03 09-24                          Puffs           [facility] of



     mcg/actuati      00:00: 00:00                          every 6           [facility]

[place]



     on inhaler      00   :00                           (six)           [facility]



                                                  hours as           Branch



                                                  needed for           



                                                  Wheezing           



                                                  or             



                                                  Shortness           



                                                  of Breath.           

 

     albuterol      2021- No        [id number] 2{puff}      Inhale 2       

    [facility]



     90        1-03 09-24                          Puffs           ity of



     mcg/actuati      00:00: 00:00                          every 6           Te

[place]



     on inhaler      00   :00                           (six)           Medical



                                                  hours as           Branch



                                                  needed for           



                                                  Wheezing           



                                                  or             



                                                  Shortness           



                                                  of Breath.           







Vital Signs







             Vital Name   Observation Time Observation Value Comments     Source

 

             Systolic blood 2021 20:00:00 134 mm[Hg]                [facility]

[facility]

 

             Diastolic blood 2021 20:00:00 73 mm[Hg]                 [facility]

[facility]

 

             Heart rate   2021 19:58:00 90 /min                   [facility]

 

             Body weight  2021 19:58:00 67.132 kg                 [facility]

 

             BMI          2021 19:58:00 29.89 kg/m2               [facility]

 

             Oxygen saturation 2021 19:58:00 97 /min                   [facility]



             in Arterial blood                                        [facility]

anch



             by Pulse oximetry                                        







Procedures

This patient has no known procedures.



Encounters







        Start   End     Encounter Admission Attending Care    Care    Encounter 

Source



        Date/Time Date/Time Type    Type    Clinicians Facility Department ID   

   

 

        2021 Telephone         Gena COLEMAN  [phone] 

[id number] [facility]



        00:00:00 00:00:00                 , Emy WILL   [id number]         

ity kamari MICHAEL   [id number]         [place]

[place]



                                                        [phone]         Medi

megan



                                                        [address]             [place]

 

        2021-10-21 2021[facility]10-21 [id number]

-[facility]



        08:30:00 08:30:00                 SENDIL                  [id number]  [facility]

 

        2021-10-21 2021-10-21 [facility]       [facility]    [id number]

[id number]



        00:00:00 00:00:00                 SENDIL                          [facility]

 

        2021-10-19 2021-10-19 Outpatient R               [facility]    [id number]

-20 [facility]



        14:15:00 14:15:00                                         [id number]  [facility]

 

        [facility]    [id number]

[id number] [facility]



        14:49:01 15:18:26 Visit           Stephanie Valadez [id number]      

   ity kamari Emmanuel [id number]         [place]

[place]



                                                [place] [phone]         [place] [id number]             [facility]                 

 

        2021 [facility]       [facility]    [id number]

-20 [facility]



        15:00:00 15:00:00                 SENDIL                  [id number]  [facility]

 

        2021 Outpatient R       MICHEAL [facility]    [id number]

[address] [facility]



        15:00:00 15:00:00                 SENDIL                          [facility]

 

        2021 Outpatient R       MITCHELL [facility]    [id number]

[id number] [facility]



        12:15:00 12:15:00                 ATTENDING                         [facility]







Results

This patient has no known results.

## 2021-12-08 NOTE — EDPHYS
Physician Documentation                                                                           

 [facility]                                                                 

Name: Emmanuelle Cool                                                                              

Age: 51 yrs                                                                                       

Sex: Female                                                                                       

: 1970                                                                                   

MRN: [id number]                                                                                   

Arrival Date: 2021                                                                          

Time: 19:57                                                                                       

Account#: [id number]                                                                            

Bed Waiting                                                                                       

Private MD:                                                                                       

EMILY Physician                                                                                      

OB/GYN:                                                                                           

                                                                                             

20:05 LMP 2016                                                                                da3 

                                                                                                  

- Immunization history:: Client reports having NOT received the Covid vaccine.                    

- Social history:: Smoking status: Patient/guardian denies using tobacco.                         

                                                                                                  

                                                                                                  

Vital Signs:                                                                                      

20:01  / 99; Pulse 104; Resp 22; Temp 98.4; Pulse Ox 98% ; Weight 60.33 kg; Height 4    da3 

      ft. 11 in. (149.86 cm);                                                                     

20:01 Body Mass Index 26.86 (60.33 kg, 149.86 cm)                                             da3 

                                                                                                  

MDM:                                                                                              

23:17 Patient medically screened.                                                             rn  

23:20 ED course: Pt left prior to evaluation, after triage.                                   rn  

                                                                                                  

                                                                                             

20:29 Order name: Urine Dipstick-Ancillary; Complete Time: 23:17                              EDMS

                                                                                             

20:45 Order name: Urine Pregnancy--Ancillary (enter results)                                  cs9 

                                                                                             

20:22 Order name: Urine Dipstick-Ancillary (obtain specimen)                                  bb  

                                                                                             

20:46 Order name: Urine Pregnancy--Ancillary; Complete Time: 23:17                            EDMS

                                                                                                  

Administered Medications:                                                                         

No medications were administered                                                                  

                                                                                                  

                                                                                                  

Disposition Summary:                                                                              

21 23:22                                                                                    

Eloped                                                                                            

      Disposition: before being seen by provider                                              bb  

      Reason: wait time                                                                       bb  

Signatures:                                                                                       

Dispatcher [employer]                           EDMS                                                 

Iram Stanton RN RN   bb                                                   

Toy Daniels MD MD rn Allan, David, RN                        RN   da3                                                  

                                                                                                  

Corrections: (The following items were deleted from the chart)                                    

20:05 20:04 PMHx: Hypertension; da3                                                           da3 

                                                                                                  

**************************************************************************************************

## 2021-12-08 NOTE — ER
Nurse's Notes                                                                                     

 [facility]                                                                 

Name: Emmanuelle Cool                                                                              

Age: 51 yrs                                                                                       

Sex: Female                                                                                       

: 1970                                                                                   

MRN: [id number]                                                                                   

Arrival Date: 2021                                                                          

Time: 19:57                                                                                       

Account#: [id number]                                                                            

Bed Waiting                                                                                       

Private MD:                                                                                       

Diagnosis:                                                                                        

                                                                                                  

Presentation:                                                                                     

                                                                                             

20:01 Chief complaint: Patient states: left flank and left side x 1 day. Coronavirus screen:  da3 

      Vaccine status: Patient reports being unvaccinated. Ebola Screen: No symptoms or risks      

      identified at this time. Initial Sepsis Screen: Does the patient meet any 2 criteria?       

      No. Patient's initial sepsis screen is negative. Risk Assessment: Do you want to hurt       

      yourself or someone else? Patient reports no desire to harm self or others. Onset of        

      symptoms was 2021.                                                             

20:01 Method Of Arrival: Ambulatory                                                           da3 

20:01 Acuity: PABLO 3                                                                           da3 

                                                                                                  

Triage Assessment:                                                                                

20:05 General: Appears uncomfortable, Behavior is calm, cooperative, anxious.                 da3 

                                                                                                  

OB/GYN:                                                                                           

20:05 LMP                                                                                 da3 

                                                                                                  

- Immunization history:: Client reports having NOT received the Covid vaccine.                    

- Social history:: Smoking status: Patient/guardian denies using tobacco.                         

                                                                                                  

                                                                                                  

Vital Signs:                                                                                      

20:01  / 99; Pulse 104; Resp 22; Temp 98.4; Pulse Ox 98% ; Weight 60.33 kg; Height 4    da3 

      ft. 11 in. (149.86 cm);                                                                     

20:01 Body Mass Index 26.86 (60.33 kg, 149.86 cm)                                             da3 

                                                                                                  

ED Course:                                                                                        

19:57 Patient arrived in ED.                                                                  da3 

20:04 Triage completed.                                                                       da3 

23:17 Toy Daniels MD is Attending Physician.                                                rn  

23:22 Patient's name was called from ER lobby. No response. Unable to locate patient. Will    bb  

      disposition as left without being seen by a provider.                                       

                                                                                                  

Administered Medications:                                                                         

No medications were administered                                                                  

                                                                                                  

                                                                                                  

Outcome:                                                                                          

23:22 Patient left the ED.                                                                    bb  

                                                                                                  

Signatures:                                                                                       

Iram Stanton RN                     RN   bb                                                   

Toy Daniels MD MD rn Allan, David, RN RN   da3                                                  

                                                                                                  

Corrections: (The following items were deleted from the chart)                                    

20:05 20:04 PMHx: Hypertension; da3                                                           da3 

                                                                                                  

**************************************************************************************************

## 2023-08-06 ENCOUNTER — HOSPITAL ENCOUNTER (EMERGENCY)
Dept: HOSPITAL 97 - ER | Age: 53
Discharge: HOME | End: 2023-08-06
Payer: COMMERCIAL

## 2023-08-06 VITALS — TEMPERATURE: 100.7 F | DIASTOLIC BLOOD PRESSURE: 77 MMHG | SYSTOLIC BLOOD PRESSURE: 162 MMHG | OXYGEN SATURATION: 99 %

## 2023-08-06 DIAGNOSIS — J02.0: Primary | ICD-10-CM

## 2023-08-06 DIAGNOSIS — I10: ICD-10-CM

## 2023-08-06 DIAGNOSIS — Z20.822: ICD-10-CM

## 2023-08-06 DIAGNOSIS — Z88.1: ICD-10-CM

## 2023-08-06 PROCEDURE — 99283 EMERGENCY DEPT VISIT LOW MDM: CPT

## 2023-08-06 PROCEDURE — 71045 X-RAY EXAM CHEST 1 VIEW: CPT

## 2023-08-06 PROCEDURE — 87811 SARS-COV-2 COVID19 W/OPTIC: CPT

## 2023-08-06 PROCEDURE — 36415 COLL VENOUS BLD VENIPUNCTURE: CPT

## 2023-08-06 PROCEDURE — 87081 CULTURE SCREEN ONLY: CPT

## 2023-08-06 PROCEDURE — 87804 INFLUENZA ASSAY W/OPTIC: CPT

## 2023-08-06 NOTE — XMS REPORT
Continuity of Care Document

                            Created on:2023



Patient:Emmanuelle Cool

Sex:Female

:1970

External Reference #:[id number]





Demographics







                          Address                   [address]



                                                    [place] [address]

 

                          Home Phone                [phone]

 

                          Work Phone                [phone]

 

                          Mobile Phone              [phone]

 

                          Email Address             [email or web address]

 

                          Preferred Language        en

 

                          Marital Status            

 

                          Rastafari Affiliation     [id number]

 

                          Race                      Unknown

 

                          Additional Race(s)        Unavailable



                                                    Unavailable



                                                    White

 

                          Ethnic Group               or 









Author







                          Organization              [facility]

t

 

                          Address                   [address]



                                                    [place] [address]

 

                          Phone                     [phone]









Support







                Name            Relationship    Address         Phone

 

                [facility]OL  [facility]                       [phone]



                                                [address] [place] [address] 

 

                [facility]OL  [facility]              Unavailable     [phone]









Care Team Providers







                    Name                Role                Phone

 

                    Mikel PYLE [facility]

hysician 

[phone]

 

                    STEPHANIE BURTON Attending Clinician Unavailable

 

                    Stephanie Burton MD Attending Clinician [phone]

 

                    Doctor Unassigned, No Name Attending Clinician Unavailable

 

                    Damon Saldivar DO     Attending Clinician [phone]

 

                    DAMON SALDIVAR        Attending Clinician Unavailable

 

                    DAMON SALDIVAR        Attending Clinician Unavailable

 

                    2, [facility]          Attending Clinician Unavailable

 

                    ELIZABETH HAWKINS Attending Clinician Unavailable

 

                    ELIZABETH HAWKINS Attending Clinician Unavailable

 

                    [facility] Attending Clinician Unavailable

 

                    Elizabeth Hawkins MD Attending Clinician [phone]

 

                    DEL MIMS Attending Clinician Unavailable

 

                    Del Mims MD Attending Clinician [phone]

 

                    Testing, [facility] Pulmonary Function Attending Clinician Unavaila

ble

 

                    Only, [facility]      Attending Clinician Unavailable

 

                    Silvina Skelton MD Attending Clinician [phone]

 

                    SILVINA SKELTON   Attending Clinician Unavailable

 

                    TITO COHEN      Attending Clinician Unavailable

 

                    Tito Cohen MD   Attending Clinician [phone]

 

                    AGNES MACK    Attending Clinician Unavailable

 

                    Agnes Mack MD Attending Clinician [phone]

 

                    Kylah CNP, Lupe CHENEY Attending Clinician [phone]

[phone]

 

                    LUPE WALLER Attending Clinician Unavailable

 

                    DANIELLE MELISSA Attending Clinician Unavailable

 

                    Belén FNP, Danielle Brigette Attending Clinician [phone]

 

                    DENI OVALLE Attending Clinician Unavailable

 

                    POP ADAME      Attending Clinician Unavailable

 

                    Tomi FNP, Florye R  Attending Clinician [phone]

 

                    Lab, [facility]      Attending Clinician Unavailable

 

                    Moscoso FNP, Roshunda R Attending Clinician [phone]

 

                    JESSE MOSCOSO R  Attending Clinician Unavailable

 

                    TOMMY LAWRENCE     Attending Clinician Unavailable

 

                    Tommy Lawrence DO  Attending Clinician [phone]

 

                    Tiana LM, Ignacia LAMB Attending Clinician Unavailable

 

                    Gena RN, Emy DELA CRUZ Attending Clinician Unavailable

 

                    Omaghomi FNP, Omayemi Attending Clinician [phone]

 

                    Unknown, Attending  Attending Clinician Unavailable

 

                    UNKNOWN, ATTENDING  Attending Clinician Unavailable

 

                    DEL MIMS Admitting Clinician Unavailable

 

                    Prasanna MALAGON, Del Cornelius Admitting Clinician [phone]

 

                    AGNES MACK    Admitting Clinician Unavailable

 

                    TITO COHEN      Admitting Clinician Unavailable

 

                    POP ADAME      Admitting Clinician Unavailable

 

                    TOMMY LAWRENCE     Admitting Clinician Unavailable









Payers







           Payer Name Policy Type Policy Number Effective Date Expiration Date [employer]            [id number]  2021            



           [place]                       00:00:00              







Problems







       Condition Condition Condition Status Onset  Resolution Last   Treating Co

mments 

Source



       Name   Details Category        Date   Date   Treatment Clinician        



                                                 Date                 

 

       Pulmonary Pulmonary Disease Active 2022                      Overview: 

[facility]



       hypertensi hypertensi               1-30                        Formattin

 ity of



       on     on                   00:00:                      g of this [place]



                                   00                          note   Medical



                                                               might be Branch



                                                               different 



                                                               from the 



                                                               original. 



                                                               Added  



                                                               automatic 



                                                               ally from 



                                                               request 



                                                               for    



                                                               surgery 



                                                               [id number] 

 

       ARENAS    ARENAS    Disease Active 2022                      Overview: Univer

s



       (dyspnea (dyspnea               1-30                        Formattin ity

 of



       on     on                   00:00:                      g of this [place]



       exertion) exertion)               00                          note   Medi

megan



                                                               might be Branch



                                                               different 



                                                               from the 



                                                               original. 



                                                               Added  



                                                               automatic 



                                                               ally from 



                                                               request 



                                                               for    



                                                               surgery 



                                                               [id number] 

 

       Diastolic Diastolic Disease Active 2022                      Overview: 

Univers



       dysfunctio dysfunctio               1-30                        Formattin

 ity of



       n      n                    00:00:                      g of this [place]



                                   00                          note   Medical



                                                               might be Branch



                                                               different 



                                                               from the 



                                                               original. 



                                                               Added  



                                                               automatic 



                                                               ally from 



                                                               request 



                                                               for    



                                                               surgery 



                                                               [id number] 

 

       Other  Other  Disease Active                              Univers



       hydronephr hydronephr               2-28                               it

y of



       osis   osis                 00:00:                             [place]



                                                                    Medical



                                                                      Branch

 

       Horseshoe Horseshoe Disease Active                              Uni

vers



       kidney kidney               2-28                               ity of



                                   00:00:                             [place]



                                   00                                 Medical



                                                                      Branch

 

       Other  Other  Disease Active                              Univers



       general general               1-13                               ity of



       counseling counseling               00:00:                             [place]

[place]



       and advice and advice               00                                 Me

dical



       for    for                                                     Branch



       contracept contracept                                                  



       mali    mali                                                     



       management management                                                  

 

       Essential Essential Disease Active                              Uni

vers



       hypertensi hypertensi               1-13                               it

y of



       on, benign on, benign               00:00:                             [place]

[place]



                                   00                                 Medical



                                                                      Branch

 

       Over   Over   Disease Active                              Univers



       weight weight               1-13                               ity of



                                   00:00:                             [address]







Allergies, Adverse Reactions, Alerts







       Allergy Allergy Status Severity Reaction(s) Onset  Inactive Treating Comm

ents 

Source



       Name   Type                        Date   Date   Clinician        

 

       NO KNOWN Drug   Active                                           Univers



       ALLERGIE Class                                                   ity of



       S                                                              [facility]







Social History







           Social Habit Start Date Stop Date  Quantity   Comments   Source

 

           Gender identity                                             [facility]it

y of



                                                                  [facility]

 

           [facility]                                             [facility]

 

           Exposure to 2023 Not sure              [facility]



           SARS-CoV-2 (event) 00:00:00   13:08:00                         [facility]

 

           Alcohol intake 2023 Ex-drinker            [facility]



                      00:00:00   00:00:00   (finding)             [facility]

 

           History of Social 2022-10-21 2022-10-21                       Univers

ity of



           function   00:00:00   00:00:00                         [facility]

 

           Tobacco use and 2022 Smokeless             [facility]it

y of



           exposure   00:00:00   00:00:00   tobacco non-user            [place] [facility]

dical



                                                                  [place]

 

           Sex Assigned At 1970                       Universit

y of



           Birth      00:00:00   00:00:[birth year]                         [facility]









                Smoking Status  Start Date      Stop Date       Source

 

                Never smoked tobacco                                 [facility] 

[facility] [place] [facility]







Medications







       Ordered Filled Start  Stop   Current Ordering Indication Dosage Frequency

 Signature

                    Comments            Components          Source



     Medication Medication Date Date Medication? Clinician                (SIG) 

          



     Name Name                                                   

 

     SPIRONOLACT      2023-0      Yes                      TAKE HALF           U

nivers



     ONE 25 mg      8-03                               TABLET BY           ity o

f



     tablet      00:00:                               MOUTH           [place]



               00                                 EVERY           Medical



                                                  MORNING           Branch

 

     SPIRONOLACT      2023-0      Yes                      TAKE HALF           U

nivers



     ONE 25 mg      8-03                               TABLET BY           ity o

f



     tablet      00:00:                               MOUTH           [place]



                                                EVERY           Medical



                                                  MORNING           Branch

 

     spironolact      2023-0      Yes            12.5mg      Take 0.5           

Univers



     one 25 mg      4-12                               tablets by           ity 

of



     tablet      00:00:                               mouth in           [place]



                                                the            Medical



                                                  morning.           Branch

 

     spironolact      2023-0      Yes            12.5mg      Take 0.5           

Univers



     one 25 mg      4-12                               tablets by           ity 

of



     tablet      00:00:                               mouth in           [place]



                                                the            Medical



                                                  morning.           Branch

 

     spironolact      2023-0      Yes            12.5mg      Take 0.5           

Univers



     one 25 mg      4-12                               tablets by           ity 

of



     tablet      00:00:                               mouth in           [place]



                                                the            Medical



                                                  morning.           Branch

 

     spironolact      2023-0      Yes            12.5mg      Take 0.5           

Univers



     one 25 mg      4-12                               tablets by           ity 

of



     tablet      00:00:                               mouth in           [place]



                                                the            Medical



                                                  morning.           Branch

 

     spironolact      2023-0      Yes            12.5mg      Take 0.5           

Univers



     one 25 mg      4-12                               tablets by           ity 

of



     tablet      00:00:                               mouth in           [place]



                                                the            Medical



                                                  morning.           Branch

 

     spironolact      2023-0      Yes            12.5mg      Take 0.5           

Univers



     one 25 mg      4-12                               tablets by           ity 

of



     tablet      00:00:                               mouth in           [place]



                                                the            Medical



                                                  morning.           Branch

 

     spironolact      2023-0      Yes            12.5mg      Take 0.5           

Univers



     one 25 mg      4-12                               tablets by           ity 

of



     tablet      00:00:                               mouth in           [place]



                                                the            Medical



                                                  morning.           Branch

 

     spironolact      2023-0      Yes            12.5mg      Take 0.5           

Univers



     one 25 mg      4-12                               tablets by           ity 

of



     tablet      00:00:                               mouth in           [place]



                                                the            Medical



                                                  morning.           Branch

 

     spironolact      2023-0      Yes            12.5mg      Take 0.5           

Univers



     one 25 mg      4-12                               tablets by           ity 

of



     tablet      00:00:                               mouth in           [place]



               00                                 the            Medical



                                                  morning.           Branch

 

     spironolact      2023-0      Yes            12.5mg      Take 0.5           

Univers



     one 25 mg      4-12                               tablets by           ity 

of



     tablet      00:00:                               mouth in           [place]



               00                                 the            Medical



                                                  morning.           Branch

 

     spironolact      -0      Yes            12.5mg      Take 0.5           

Univers



     one 25 mg      4-12                               tablets by           ity 

of



     tablet      00:00:                               mouth in           [place]



               00                                 the            Medical



                                                  morning.           Branch

 

     spironolact      -0 - No             12.5mg      Take 0.5          

 Univers



     one 25 mg      4-12 08-03                          tablets by           ity

 of



     tablet      00:00: 00:00                          mouth in           [place]



               00   :00                           the            Medical



                                                  morning.           Branch

 

     spironolact      -0 - No             12.5mg      Take 0.5          

 Univers



     one 25 mg      4-12 08-03                          tablets by           ity

 of



     tablet      00:00: 00:00                          mouth in           [place]



               00   :00                           the            Medical



                                                  morning.           Branch

 

     spironolact      -0 - No        [id number] 12.5mg      Take 0.5      

     Univers



     one       --                          tablets by           ity of



     (ALDACTONE)      00:00: 05:59                          mouth in           [place]

[place]



     25 mg      00   :00                           the            Medical



     tablet                                         morning           Branch



                                                  for 30           



                                                  days.           

 

     spironolact      -0 - No        [id number] 12.5mg      Take 0.5      

     Univers



     one       --                          tablets by           ity of



     (ALDACTONE)      00:00: 05:59                          mouth in           T

[place]



     25 mg      00   :00                           the            Medical



     tablet                                         morning           Branch



                                                  for 30           



                                                  days.           

 

     spironolact      -0 - No        [id number] 12.5mg      Take 0.5      

     Univers



     one       -                          tablets by           ity of



     (ALDACTONE)      00:00: 05:59                          mouth in           T

[place]



     25 mg      00   :00                           the            Medical



     tablet                                         morning           Branch



                                                  for 30           



                                                  days.           

 

     spironolact      -0 - No        [id number] 12.5mg      Take 0.5      

     Univers



     one       - 02-03                          tablets by           ity of



     (ALDACTONE)      00:00: 05:59                          mouth in           [place]

[place]



     25 mg      00   :00                           the            Medical



     tablet                                         morning           Branch



                                                  for 30           



                                                  days.           

 

     spironolact      -0 - No        [id number] 12.5mg      Take 0.5      

     Univers



     one       - 02-03                          tablets by           ity of



     (ALDACTONE)      00:00: 05:59                          mouth in           T

[place]



     25 mg      00   :00                           the            Medical



     tablet                                         morning           Branch



                                                  for 30           



                                                  days.           

 

     spironolact      0 - No        [id number] 12.5mg      Take 0.5      

     Univers



     one                                 tablets by           ity of



     (ALDACTONE)      00:00: 05:59                          mouth in           T

[place]



     25 mg      00   :00                           the            Medical



     tablet                                         morning           Branch



                                                  for 30           



                                                  days.           

 

     spironolact      -0 - No        [id number] 12.5mg      Take 0.5      

     Univers



     one                                 tablets by           ity of



     (ALDACTONE)      00:00: 05:59                          mouth in           T

[place]



     25 mg      00   :00                           the            Medical



     tablet                                         morning           Branch



                                                  for 30           



                                                  days.           

 

     spironolact      -0 - No        [id number] 12.5mg      Take 0.5      

     Univers



     one                                 tablets by           ity of



     (ALDACTONE)      00:00: 05:59                          mouth in           T

exas



     25 mg      00   :00                           the            [facility]



     tablet                                         morning           [facility]



                                                  for 30           



                                                  days.           

 

     spironolact      -0 - No        [id number] 12.5mg      Take 0.5      

     [facility]



     one                                 tablets by           [facility] of



     (ALDACTONE)      00:00: 05:59                          mouth in           [place]

[place]



     25 mg      00   :00                           the            [facility]



     tablet                                         morning           [facility]



                                                  for 30           



                                                  days.           

 

     spironolact      -0 - No        [id number] 12.5mg      Take 0.5      

     [facility]



     one                                 tablets by           ity of



     (ALDACTONE)      00:00: 05:59                          mouth in           [facility]

[place]



     25 mg      00   :00                           the            [facility]



     tablet                                         morning           [facility]



                                                  for 30           



                                                  days.           

 

     TAKE 1      2022      No                                      



     TABLET      2-15                                              



     DAILY.      00:00:                                              



               00                                                

 

     IBUPROFEN      -1      No                                      



     600MG      2-15                                              



               00:00:                                              



               00                                                

 

     Dose      2022      No                                      



     Unknown      2-15                                              



               00:00:                                              



               00                                                

 

     CYCLOBENZAP      2022      No                                      



     R 5MG      2-15                                              



               00:00:                                              



               00                                                

 

     Dose      -      No                                      



     Unknown      2-15                                              



               00:00:                                              



               00                                                

 

     Dose      2022      No                                      



     Unknown      2-15                                              



               00:00:                                              



               00                                                

 

     Dose      -      No                                      



     Unknown      2-15                                              



               00:00:                                              



               00                                                

 

     Dose      2022      No                                      



     Unknown      2-15                                              



               00:00:                                              



               00                                                

 

     Dose      2022      No                                      



     Unknown      2-15                                              



               00:00:                                              



               00                                                

 

     CYCLOBENZAP      2022      No                                      



     R 5MG      2-15                                              



               00:00:                                              



               00                                                

 

     Dose      2022      No                                      



     Unknown      2-15                                              



               00:00:                                              



               00                                                

 

     Dose      2022      No                                      



     Unknown      2-15                                              



               00:00:                                              



               00                                                

 

     APPLY      2022      No                                      



     SPARINGLY      2-14                                              



     TO AFFECTED      00:00:                                              



     AREA(S)      00                                                



     TWICE DAILY                                                        

 

     INSTILL 1      2022      No                                      



     DROP IN      2-14                                              



     LEFT EYE 4      00:00:                                              



     TIMES A DAY      00                                                

 

     Dose      2022      No                                      



     Unknown      2-14                                              



               00:00:                                              



               00                                                

 

     Dose      2022      No                                      



     Unknown      2-14                                              



               00:00:                                              



               00                                                

 

     ESCITALOPRA      2022      No                                      



     M 20MG      2-14                                              



               00:00:                                              



               00                                                

 

     Dose      2022      No                                      



     Unknown      2-14                                              



               00:00:                                              



               00                                                

 

     Dose      2022      No                                      



     Unknown      2-14                                              



               00:00:                                              



               00                                                

 

     ESCITALOPRA      2022      No                                      



     M 10MG      2-14                                              



               00:00:                                              



               00                                                

 

     Dose      2022      No                                      



     Unknown      2-14                                              



               00:00:                                              



               00                                                

 

     Dose      2022      No                                      



     Unknown      2-14                                              



               00:00:                                              



               00                                                

 

     Dose      2022      No                                      



     Unknown      2-14                                              



               00:00:                                              



               00                                                

 

     Dose      2022      No                                      



     Unknown      2-14                                              



               00:00:                                              



               00                                                

 

     ATOMOXETINE      2022      No                                      



     CAP 40MG      2-14                                              



               00:00:                                              



               00                                                

 

     BPM-PSE-DM      2022      No                                      



     SYP 2-30-10      2-14                                              



               00:00:                                              



               00                                                

 

     AZITHROMYCI      2022      No                                      



     N 250MG      2-14                                              



               00:00:                                              



               00                                                

 

     APPLY      2022      No                                      



     SPARINGLY      2-14                                              



     TO AFFECTED      00:00:                                              



     AREA(S)      00                                                



     TWICE DAILY                                                        

 

     TAKE 1      2022      No                                      



     TABLET      2-14                                              



     DAILY.      00:00:                                              



               00                                                

 

     INSTILL 1      2022      No                                      



     DROP IN      2-14                                              



     LEFT EYE 4      00:00:                                              



     TIMES A DAY      00                                                

 

     CIPROFLOXAC      2022      No                                      



     N 500MG      2-14                                              



               00:00:                                              



               00                                                

 

     ESCITALOPRA      2022      No                                      



     M 20MG      2-14                                              



               00:00:                                              



               00                                                

 

     METOCLOPRAM      2022      No                                      



     10MG      2-14                                              



               00:00:                                              



               00                                                

 

     HYDROCO/APA      2022      No                                      



     P 10-325MG      2-14                                              



               00:00:                                              



               00                                                

 

     ESCITALOPRA      2022      No                                      



     M 10MG      2-14                                              



               00:00:                                              



               00                                                

 

     ONDANSETRON      2022      No                                      



     4MG ODT      2-14                                              



               00:00:                                              



               00                                                

 

     DICYCLOMINE      2022      No                                      



     CAP 10MG      2-14                                              



               00:00:                                              



               00                                                

 

     Dose      2022      No                                      



     Unknown      2-14                                              



               00:00:                                              



               00                                                

 

     Dose      2022      No                                      



     Unknown      2-14                                              



               00:00:                                              



               00                                                

 

     Dose      2022      No                                      



     Unknown      2-14                                              



               00:00:                                              



               00                                                

 

     Dose      2022      No                                      



     Unknown      2-14                                              



               00:00:                                              



               00                                                

 

     Dose      2022      No                                      



     Unknown      2-14                                              



               00:00:                                              



               00                                                

 

     iopamidol      2022- No                       ONCE INTRA           U

nivers



     (ISOVUE                                PROCEDURE,           ity o

f



     370-500 mL)      14:24: 14:27                          Starting           [facility]

[place]



     injection      06   :30                           on [facility]



                                                  22           [facility]



                                                  at 0824,           



                                                  Until Mon           



                                                  22           



                                                  at 0827,           



                                                  Routine,           



                                                  CV             



                                                  Intraproce           



                                                  dure           

 

     aspirin      2022- No        [id number] 325mg      325 mg,           Un

[facility]



     tablet 325                                Oral,           ity of



     mg        14:15: 13:25                          ONCE, 1           [place]



               00   :00                           dose, On           [facility]



                                                  22           



                                                  at 0815,           



                                                  STAT           

 

     aspirin      2022- No        [id number] 325mg      325 mg,           Un

[facility]



     tablet 325                                Oral,           ity of



     mg        14:15: 13:25                          ONCE, 1           [place]



               00   :00                           dose, On           [facility]



                                                  22           



                                                  at 0815,           



                                                  STAT           

 

     heparin      2022- No                       ONCE INTRA           Uni

vers



     1,000                                PROCEDURE,           ity of



     unit/mL      14:01: 14:27                          Starting           [place]



     injection      20   :30                           on [facility]



                                                  22           [facility]



                                                  at 0801,           



                                                  Until Mon           



                                                  22           



                                                  at 0827,           



                                                  Routine,           



                                                  CV             



                                                  Intraproce           



                                                  dure           

 

     verapamiL      2022- No                       ONCE INTRA           U

nivers



     (ISOPTIN)                                PROCEDURE,           ity

 of



     injection      14:01: 14:27                          Starting           [place]

[facility]



               10   :30                           on [facility]



                                                  22           [facility]



                                                  at 0801,           



                                                  Until Mon           



                                                  22           



                                                  at 0827,           



                                                  Routine,           



                                                  CV             



                                                  Intraproce           



                                                  dure           

 

     nitroglycer      2022- No                       ONCE INTRA          

 [facility]



     in (TRIDIL)                                PROCEDURE,           i

ty of



     2 mg in 10      14:01: 14:27                          Starting           [place]

[place]



     mL D5W for      00   :30                           on [facility]



     Cardiac                                         22           [facility]



     Cath                                         at 0801,           



                                                  Until Mon           



                                                  22           



                                                  at 0827,           



                                                  Routine,           



                                                  CV             



                                                  Intraproce           



                                                  dure           

 

     lidocaine      2022- No                       ONCE INTRA           U

nivers



     1% (PF)                                PROCEDURE,           ity o

f



     (XYLOCAINE)      13:52: 14:27                          Starting           [facility]

[place]



     injection      25   :30                           on [facility]



                                                  22           [facility]



                                                  at 0752,           



                                                  Until Mon           



                                                  22           



                                                  at 0827,           



                                                  Routine,           



                                                  CV             



                                                  Intraproce           



                                                  dure           

 

     FENTanyl PF      2022- No                       ONCE INTRA          

 [facility]



     (SUBLIMAZE                                PROCEDURE,           it

y of



     (PF))      13:45: 14:27                          Starting           [place]



     injection      46   :30                           on [facility]



                                                  22           [facility]



                                                  at 0745,           



                                                  Until Mon           



                                                  22           



                                                  at 0827,           



                                                  Routine,           



                                                  CV             



                                                  Intraproce           



                                                  dure           

 

     midazolam      2022- No                       ONCE INTRA           U

nivers



     (VERSED)                                PROCEDURE,           ity 

of



     injection      13:45: 14:27                          Starting           [place]

[place]



               37   :30                           on [facility]



                                                  22           [facility]



                                                  at 0745,           



                                                  Until Mon           



                                                  22           



                                                  at 0827,           



                                                  Routine,           



                                                  CV             



                                                  Intraproce           



                                                  dure           

 

     Dose      -0      No                                      



     Unknown      9-12                                              



               00:00:                                              



               00                                                

 

     Dose      -0      No                                      



     Unknown      9-12                                              



               00:00:                                              



               00                                                

 

     Dose      -0      No                                      



     Unknown      8-09                                              



               00:00:                                              



               00                                                

 

     Dose      -0      No                                      



     Unknown      8-09                                              



               00:00:                                              



               00                                                

 

     Dose      2-0      No                                      



     Unknown      7-14                                              



               00:00:                                              



               00                                                

 

     Dose      2-0      No                                      



     Unknown      7-14                                              



               00:00:                                              



               00                                                

 

     Dose      2-0      No                                      



     Unknown      7-14                                              



               00:00:                                              



               00                                                

 

     Dose      2-0      No                                      



     Unknown      7-14                                              



               00:00:                                              



               00                                                

 

     Dose      2-0      No                                      



     Unknown      7-14                                              



               00:00:                                              



               00                                                

 

     Dose      2-0      No                                      



     Unknown      7-14                                              



               00:00:                                              



               00                                                

 

     Dose      2-0      No                                      



     Unknown      7-14                                              



               00:00:                                              



               00                                                

 

     Dose      2-0      No                                      



     Unknown      7-14                                              



               00:00:                                              



               00                                                

 

     Lexapro 20      -0      No             1mg                      



     mg tablet      7-11                                              



               00:00:                                              



               00                                                

 

     mirtazapine      -0      No             1mg                      



     15 mg      7-11                                              



     tablet      00:00:                                              



               00                                                

 

     Dose      2-0      No                                      



     Unknown      7-11                                              



               00:00:                                              



               00                                                

 

     Lexapro 20      -0      No             1mg                      



     mg tablet                                                    



               00:00:                                              



               00                                                

 

     mirtazapine      -0      No             1mg                      



     15 mg                                                    



     tablet      00:00:                                              



               00                                                

 

     Dose      -0      No                                      



     Unknown      7                                              



               00:00:                                              



               00                                                

 

     Dose      -0      No                                      



     Unknown      7                                              



               00:00:                                              



               00                                                

 

     Dose      -0      No                                      



     Unknown      7-                                              



               00:00:                                              



               00                                                

 

     Dose      -0      No                                      



     Unknown      7-                                              



               00:00:                                              



               00                                                

 

     Dose      -0      No                                      



     Unknown      7-                                              



               00:00:                                              



               00                                                

 

     Dose      -0      No                                      



     Unknown      7-                                              



               00:00:                                              



               00                                                

 

     Dose      -0      No                                      



     Unknown      -11                                              



               00:00:                                              



               00                                                

 

     regadenoson      -0 - No        [id number] .4mg      0.4 mg, IV     

      [facility]



     (LEXISCAN)                                Push,           ity of



     injection      15:15: 15:01                          ONCE, 1           [place]

s



     0.4 mg      00   :00                           dose, On           [facility]



                                                  22 at           



                                                  1015,           



                                                  Routine<br           



                                                  >Faculty           



                                                  member           



                                                  approving           



                                                  Restricted           



                                                  medication           



                                                  : [place] [facility]           

 

     tc        2022- No        [id number] 42.9mCi      42.9           Unive

rs



     99m-tetrofo                                millicurie           i

ty of



     [facility]      15:15: 15:01                          ,              [place]



     ([facility])      00   :00                           Intravenou           Medi

megan



     injection                                         s, ONCE, 1           [facility]

ch



     42.9                                         dose, On           



     [facility]            



                                                  22 at           



                                                  1015,           



                                                  Routine           

 

     tc        2022- No        [id number] 15.2mCi      15.2           Univ

ers



     99m-tetrofo                                millicurie           i

ty of



     [facility]      14:15: 14:02                          ,              [place]



     ([facility])      00   :00                           Intravenou           Medi

megan



     injection                                         s, ONCE, 1           [facility]

ch



     15.2                                         dose, On           



     [facility]            



                                                  22 at           



                                                  0915,           



                                                  Routine           

 

     Lexapro 20      -0      No             1mg                      



     mg tablet                                                    



               00:00:                                              



               00                                                

 

     mirtazapine      2-0      No             1mg                      



     15 mg      -17                                              



     tablet      00:00:                                              



               00                                                

 

     Lexapro 20      -0      No             1mg                      



     mg tablet      6-17                                              



               00:00:                                              



               00                                                

 

     mirtazapine      2022-0      No             1mg                      



     15 mg      6-17                                              



     tablet      00:00:                                              



               00                                                

 

     Lexapro 20      2022-0      No             1mg                      



     mg tablet      6-17                                              



               00:00:                                              



               00                                                

 

     mirtazapine      2022-0      No             1mg                      



     15 mg      6-17                                              



     tablet      00:00:                                              



               00                                                

 

     Lexapro 20      2022-0      No             1mg                      



     mg tablet      6-17                                              



               00:00:                                              



               00                                                

 

     mirtazapine      2022-0      No             1mg                      



     15 mg      6-17                                              



     tablet      00:00:                                              



               00                                                

 

     Lexapro 20      2022-0      No             1mg                      



     mg tablet      5-                                              



               00:00:                                              



               00                                                

 

     mirtazapine      2022-0      No             1mg                      



     15 mg      5-03                                              



     tablet      00:00:                                              



               00                                                

 

     Lexapro 20      2022-0      No             1mg                      



     mg tablet      5-                                              



               00:00:                                              



               00                                                

 

     mirtazapine      2022-0      No             1mg                      



     15 mg      5-03                                              



     tablet      00:00:                                              



               00                                                

 

     Lexapro 20      2022-0      No             1mg                      



     mg tablet      5-                                              



               00:00:                                              



               00                                                

 

     mirtazapine      2022-0      No             1mg                      



     15 mg      5-03                                              



     tablet      00:00:                                              



               00                                                

 

     Lexapro 20      2022-0      No             1mg                      



     mg tablet                                                    



               00:00:                                              



               00                                                

 

     mirtazapine      2022-0      No             1mg                      



     15 mg      5-03                                              



     tablet      00:00:                                              



               00                                                

 

     morpHINE      -0 - No             4mg       4 mg, Slow           Un

selene



     injection 4      -20                          IV Push,           ity

 of



     mg        08:15: 07:05                          ONCE, 1           [place]



               00   :00                           dose, On           Medical



                                                  Wed            Branch



                                                  22 at           



                                                  0315, STAT           

 

     morpHINE      0 - No             4mg       4 mg, Slow           Un

selene



     injection 4      -20                          IV Push,           ity

 of



     mg        08:00: 07:05                          ONCE, 1           [place]



               00   :00                           dose, On           Medical



                                                  Wed            Branch



                                                  22 at           



                                                  0300, STAT           

 

     ketorolac      -0 - No             30mg      30 mg,           Unive

rs



     (TORADOL)       04-20                          Slow IV           ity of



     injection      07:00: 06:04                          Push,           [place]



     30 mg      00   :00                           ONCE, 1           Medical



                                                  dose, On           Branch



                                                  Wed            



                                                  22 at           



                                                  0200,           



                                                  Routine<br           



                                                  >Faculty           



                                                  member           



                                                  approving           



                                                  Restricted           



                                                  medication           



                                                  :              



                                                  LOLI JIMENEZ           

 

     NaCl 0.9%      2022-0      Yes            1000mL      at 999           Univ

ers



     (NS) IV      4-20                               mL/hr,           ity of



     infusion      05:30:                               Intravenou           [place]

as



     1,000 mL      00                                 s,             Medical



                                                  CONTINUOUS           Branch



                                                  , Starting           



                                                  on 22 at           



                                                  0030,           



                                                  Until           



                                                  Discontinu           



                                                  ed,            



                                                  Routine           

 

     ondansetron      2022- No             4mg       4 mg, Slow          

 [facility]



     (ZOFRAN      4-20 04-20                          IV Push,           ity of



     (PF))      05:30: 05:11                          ONCE, 1           [place]



     injection 4      00   :00                           dose, On           Medi

megan



     mg                                           Wed            Branch



                                                  22 at           



                                                  0030, ASAP           

 

     morpHINE      0 - No             4mg       4 mg, Slow           Un

selene



     injection 4      4-20 04-20                          IV Push,           ity

 of



     mg        05:30: 05:11                          ONCE, 1           [place]



               00   :00                           dose, On           Medical



                                                  Wed            Branch



                                                  22 at           



                                                  0030, STAT           

 

     HYDROcodone      -0      Yes       4647 1{tbl}      Take 1           Un

selene



     -acetaminop      4-20                               tablet by           ity

 of



     hen (NORCO)      00:00:                               mouth           [place]



      mg      00                                 every 6           Medical



     tablet                                         (six)           Branch



                                                  hours as           



                                                  needed for           



                                                  Pain           



                                                  (scale           



                                                  7-10).           



                                                  Indication           



                                                  s: acute           



                                                  pain           

 

     HYDROcodone      -0      Yes       4647 1{tbl}      Take 1           Un

selene



     -acetaminop      4-20                               tablet by           ity

 of



     hen (NORCO)      00:00:                               mouth           [place]



      mg      00                                 every 6           Medical



     tablet                                         (six)           Branch



                                                  hours as           



                                                  needed for           



                                                  Pain           



                                                  (scale           



                                                  7-10).           



                                                  Indication           



                                                  s: acute           



                                                  pain           

 

     HYDROcodone      -0      Yes       4647 1{tbl}      Take 1           Un

selene



     -acetaminop      4-20                               tablet by           ity

 of



     hen (NORCO)      00:00:                               mouth           [place]



      mg      00                                 every 6           Medical



     tablet                                         (six)           Branch



                                                  hours as           



                                                  needed for           



                                                  Pain           



                                                  (scale           



                                                  7-10).           



                                                  Indication           



                                                  s: acute           



                                                  pain           

 

     HYDROcodone      -0      Yes       4647 1{tbl}      Take 1           Un

selene



     -acetaminop      4-20                               tablet by           ity

 of



     hen (NORCO)      00:00:                               mouth           [place]



      mg      00                                 every 6           Medical



     tablet                                         (six)           Branch



                                                  hours as           



                                                  needed for           



                                                  Pain           



                                                  (scale           



                                                  7-10).           



                                                  Indication           



                                                  s: acute           



                                                  pain           

 

     HYDROcodone      -0      Yes       4647 1{tbl}      Take 1           Un

selene



     -acetaminop      4-20                               tablet by           ity

 of



     hen (NORCO)      00:00:                               mouth           [place]



      mg      00                                 every 6           Medical



     tablet                                         (six)           Branch



                                                  hours as           



                                                  needed for           



                                                  Pain           



                                                  (scale           



                                                  7-10).           



                                                  Indication           



                                                  s: acute           



                                                  pain           

 

     HYDROcodone      2022-0      Yes       4647 1{tbl}      Take 1           Un

selene



     -acetaminop      4-20                               tablet by           ity

 of



     hen (NORCO)      00:00:                               mouth           [place]



      mg      00                                 every 6           Medical



     tablet                                         (six)           Branch



                                                  hours as           



                                                  needed for           



                                                  Pain           



                                                  (scale           



                                                  7-10).           



                                                  Indication           



                                                  s: acute           



                                                  pain           

 

     HYDROcodone      2022-0      Yes       4647 1{tbl}      Take 1           Un

selene



     -acetaminop      4-20                               tablet by           ity

 of



     hen (NORCO)      00:00:                               mouth           [place]



      mg      00                                 every 6           Medical



     tablet                                         (six)           Branch



                                                  hours as           



                                                  needed for           



                                                  Pain           



                                                  (scale           



                                                  7-10).           



                                                  Indication           



                                                  s: acute           



                                                  pain           

 

     HYDROcodone      2022-0      Yes       4647 1{tbl}      Take 1           Un

selene



     -acetaminop      4-20                               tablet by           ity

 of



     hen (NORCO)      00:00:                               mouth           [place]



      mg      00                                 every 6           Medical



     tablet                                         (six)           Branch



                                                  hours as           



                                                  needed for           



                                                  Pain           



                                                  (scale           



                                                  7-10).           



                                                  Indication           



                                                  s: acute           



                                                  pain           

 

     HYDROcodone      2022-0      Yes       4647 1{tbl}      Take 1           Un

selene



     -acetaminop      4-20                               tablet by           ity

 of



     hen (NORCO)      00:00:                               mouth           [place]



      mg      00                                 every 6           Medical



     tablet                                         (six)           Branch



                                                  hours as           



                                                  needed for           



                                                  Pain           



                                                  (scale           



                                                  7-10).           



                                                  Indication           



                                                  s: acute           



                                                  pain           

 

     HYDROcodone      2022-0      Yes       4647 1{tbl}      Take 1           Un

selene



     -acetaminop      4-20                               tablet by           ity

 of



     hen (NORCO)      00:00:                               mouth           [place]



      mg      00                                 every 6           Medical



     tablet                                         (six)           [facility]



                                                  hours as           



                                                  needed for           



                                                  Pain           



                                                  (scale           



                                                  7-10).           



                                                  Indication           



                                                  s: acute           



                                                  pain           

 

     HYDROcodone      2022-0      Yes       4647 1{tbl}      Take 1           Un

[facility]



     -acetaminop      4-20                               tablet by           ity

 of



     hen (NORCO)      00:00:                               mouth           [place]



      mg      00                                 every 6           Medical



     tablet                                         (six)           [facility]



                                                  hours as           



                                                  needed for           



                                                  Pain           



                                                  (scale           



                                                  7-10).           



                                                  Indication           



                                                  s: acute           



                                                  pain           

 

     HYDROcodone      2022-0      Yes       4647 1{tbl}      Take 1           Un

[facility]



     -acetaminop      4-20                               tablet by           ity

 of



     hen (NORCO)      00:00:                               mouth           [place]



      mg      00                                 every 6           Medical



     tablet                                         (six)           [facility]



                                                  hours as           



                                                  needed for           



                                                  Pain           



                                                  (scale           



                                                  7-10).           



                                                  Indication           



                                                  s: acute           



                                                  pain           

 

     HYDROcodone      2022-0      Yes       4647 1{tbl}      Take 1           Un

[facility]



     -acetaminop      4-20                               tablet by           ity

 of



     hen (NORCO)      00:00:                               mouth           [place]



      mg      00                                 every 6           Medical



     tablet                                         (six)           [facility]



                                                  hours as           



                                                  needed for           



                                                  Pain           



                                                  (scale           



                                                  7-10).           



                                                  Indication           



                                                  s: acute           



                                                  pain           

 

     HYDROcodone      2022-0      Yes       4647 1{tbl}      Take 1           Un

[facility]



     -acetaminop      4-20                               tablet by           ity

 of



     hen (NORCO)      00:00:                               mouth           [place]



      mg      00                                 every 6           Medical



     tablet                                         (six)           [facility]



                                                  hours as           



                                                  needed for           



                                                  Pain           



                                                  (scale           



                                                  7-10).           



                                                  Indication           



                                                  s: acute           



                                                  pain           

 

     HYDROcodone      2022-0      Yes       4647 1{tbl}      Take 1           Un

[facility]



     -acetaminop      4-20                               tablet by           ity

 of



     hen (NORCO)      00:00:                               mouth           [place]



      mg      00                                 every 6           Medical



     tablet                                         (six)           [facility]



                                                  hours as           



                                                  needed for           



                                                  Pain           



                                                  (scale           



                                                  7-10).           



                                                  Indication           



                                                  s: acute           



                                                  pain           

 

     HYDROcodone      2-0      Yes       4647 1{tbl}      Take 1           Un

[facility]



     -acetaminop      4-20                               tablet by           ity

 of



     hen (NORCO)      00:00:                               mouth           [place]



      mg      00                                 every 6           Medical



     tablet                                         (six)           [facility]



                                                  hours as           



                                                  needed for           



                                                  Pain           



                                                  (scale           



                                                  7-10).           



                                                  Indication           



                                                  s: acute           



                                                  pain           

 

     HYDROcodone      2022-0      Yes       4647 1{tbl}      Take 1           Un

[facility]



     -acetaminop      4-20                               tablet by           ity

 of



     hen (NORCO)      00:00:                               mouth           [place]



      mg      00                                 every 6           Medical



     tablet                                         (six)           [facility]



                                                  hours as           



                                                  needed for           



                                                  Pain           



                                                  (scale           



                                                  7-10).           



                                                  Indication           



                                                  s: acute           



                                                  pain           

 

     HYDROcodone      2022-0      Yes       4647 1{tbl}      Take 1           Un

[facility]



     -acetaminop      4-20                               tablet by           ity

 of



     hen (NORCO)      00:00:                               mouth           [place]



      mg      00                                 every 6           Medical



     tablet                                         (six)           Br[facility]



                                                  hours as           



                                                  needed for           



                                                  Pain           



                                                  (scale           



                                                  7-10).           



                                                  Indication           



                                                  s: acute           



                                                  pain           

 

     HYDROcodone      2022-0      Yes       4647 1{tbl}      Take 1           Un

[facility]



     -acetaminop      4-20                               tablet by           ity

 of



     hen (NORCO)      00:00:                               mouth           [place]



      mg      00                                 every 6           Medical



     tablet                                         (six)           [facility]



                                                  hours as           



                                                  needed for           



                                                  Pain           



                                                  (scale           



                                                  7-10).           



                                                  Indication           



                                                  s: acute           



                                                  pain           

 

     HYDROcodone      -0      Yes       4647 1{tbl}      Take 1           Un

[facility]



     -acetaminop      4-20                               tablet by           ity

 of



     hen (NORCO)      00:00:                               mouth           [place]



      mg      00                                 every 6           Medical



     tablet                                         (six)           [facility]



                                                  hours as           



                                                  needed for           



                                                  Pain           



                                                  (scale           



                                                  7-10).           



                                                  Indication           



                                                  s: acute           



                                                  pain           

 

     HYDROcodone      -      Yes       4647 1{tbl}      Take 1           Un

[facility]



     -acetaminop      4-20                               tablet by           ity

 of



     hen (NORCO)      00:00:                               mouth           [place]



      mg      00                                 every 6           Medical



     tablet                                         (six)           [facility]



                                                  hours as           



                                                  needed for           



                                                  Pain           



                                                  (scale           



                                                  7-10).           



                                                  Indication           



                                                  s: acute           



                                                  pain           

 

     HYDROcodone      2022- No        4647 1{tbl}      Take 1           U

[facility]ivers



     -acetaminop      4-20 12-12                          tablet by           it

y of



     hen ([facility]CO)      00:00: 00:00                          [place]           [place]

[place]



      mg      00   :00                           every 6           Medical



     tablet                                         (six)           [facility]



                                                  hours as           



                                                  needed for           



                                                  Pain           



                                                  (scale           



                                                  7-10).           



                                                  Indication           



                                                  s: acute           



                                                  pain           

 

     HYDROcodone      2022- No        4647 1{tbl}      Take 1           U

n[facility]



     -acetaminop      4-20 12-12                          tablet by           it

y of



     hen ([facility]CO)      00:00: 00:00                          mouth           [place]

[place]



      mg      00   :00                           every 6           Medical



     tablet                                         (six)           [facility]



                                                  hours as           



                                                  needed for           



                                                  Pain           



                                                  (scale           



                                                  7-10).           



                                                  Indication           



                                                  s: acute           



                                                  pain           

 

     furosemide      2022- No        [id number] 40mg      40 mg,           

[facility]



     (LASIX)                                Slow IV           [facility] of



     injection      17:45: 17:40                          Push,           [place]



     40 mg      00   :00                           ONCE, 1           Medical



                                                  dose, On           Branch



                                                  22 at           



                                                  1245,           



                                                  Routine           

 

     tc        2022- No        [id number] 9.8mCi      9.8            [facility]

s



     99m-mertiat                                millicurie           i

ty of



     kwaku       17:00: 17:00                          ,              [place]



     (TECHNESCAN      00   :00                           Intravenou           Me

dical



     MAG3)                                         s, ONCE, 1           Branch



     injection                                         dose, On           



     9.8                                          Wed            



     [facility]                                         22 at           



                                                  1200,           



                                                  Routine           

 

     lisinopril            No             1mg                      



     10 mg      4-12                                              



     tablet      00:00:                                              



               00                                                

 

     lisinopril      0      No             1mg                      



     10 mg      4-12                                              



     tablet      00:00:                                              



               00                                                

 

     Dose      -0      No                                      



     Unknown      4-12                                              



               00:00:                                              



               00                                                

 

     TAKE 1      0      No                                      



     TABLET      4-12                                              



     DAILY.      00:00:                                              



               00                                                

 

     cefTRIAXone      2022- No        [id number] 1000mg                   

  [facility]



     (ROCEPHIN)                                               ity of



     injection      14:45: 13:30                                         [place]



     1,000 mg      00   :00                                          [facility]

 

     cefTRIAXone      2022- No        [id number] 1000mg      1,000 mg,    

       [facility]



     (ROCEPHIN)                                Intramuscu           it

y of



     injection      14:45: 13:30                          lar, ONCE,           T

[facility]



     1,000 mg      00   :00                           1 dose, On           Medic

al



                                                  22           Branch



                                                  at 0945,           



                                                  ASAP<br>Re           



                                                  ason for           



                                                  Anti-Infec           



                                                  tive:           



                                                  Surgical           



                                                  Prophylaxi           



                                                  s<br>Surgi           



                                                  megan            



                                                  Prophylaxi           



                                                  s:             



                                                  Genitourin           



                                                  rossana<br>Dur           



                                                  ation of           



                                                  therapy:           



                                                  within 24           



                                                  hours of           



                                                  surgery           

 

     cefTRIAXone      2022- No        [id number] 1000mg                   

  [facility]



     (ROCEPHIN)                                               ity of



     injection      14:45: 13:30                                         [place]



     1,000 mg      00   :00                                          [facility]

 

     cefTRIAXone      2022- No        [id number] 1000mg      1,000 mg,    

       [facility]



     (ROCEPHIN)                                Intramuscu           it

y of



     injection      14:45: 13:30                          lar, ONCE,           T

[facility]



     1,000 mg      00   :00                           1 dose, On           Medic

al



                                                  22           Branch



                                                  at 0945,           



                                                  ASAP<br>Re           



                                                  ason for           



                                                  Anti-Infec           



                                                  tive:           



                                                  Surgical           



                                                  Prophylaxi           



                                                  s<br>Surgi           



                                                  megan            



                                                  Prophylaxi           



                                                  s:             



                                                  Genitourin           



                                                  rossana<br>Dur           



                                                  ation of           



                                                  therapy:           



                                                  within 24           



                                                  hours of           



                                                  surgery           

 

     Dose      -0      No                                      



     Unknown      3-29                                              



               00:00:                                              



               00                                                

 

     Dose      -0      No                                      



     Unknown      3-29                                              



               00:00:                                              



               00                                                

 

     Dose      2022-0      No                                      



     Unknown      3-29                                              



               00:00:                                              



               00                                                

 

     Dose      2022-0      No                                      



     Unknown      3-29                                              



               00:00:                                              



               00                                                

 

     Dose      2022-0      No                                      



     Unknown      3-29                                              



               00:00:                                              



               00                                                

 

     Dose      2022-0      No                                      



     Unknown      3-29                                              



               00:00:                                              



               00                                                

 

     Dose      2022-0      No                                      



     Unknown      3-29                                              



               00:00:                                              



               00                                                

 

     Dose      2022-0      No                                      



     Unknown      3-29                                              



               00:00:                                              



               00                                                

 

     Dose      2022-0      No                                      



     Unknown      3-18                                              



               00:00:                                              



               00                                                

 

     Dose      2022-0      No                                      



     Unknown      3-18                                              



               00:00:                                              



               00                                                

 

     Dose      2022-0      No                                      



     Unknown      3-18                                              



               00:00:                                              



               00                                                

 

     Dose      2022-0      No                                      



     Unknown      3-18                                              



               00:00:                                              



               00                                                

 

     Dose      2022-0      No                                      



     Unknown      3-18                                              



               00:00:                                              



               00                                                

 

     Dose      2022-0      No                                      



     Unknown      3-18                                              



               00:00:                                              



               00                                                

 

     Dose      2022-0      No                                      



     Unknown      3-18                                              



               00:00:                                              



               00                                                

 

     Dose      2022-0      No                                      



     Unknown      3-18                                              



               00:00:                                              



               00                                                

 

     Dose      2022-0      No                                      



     Unknown      3-18                                              



               00:00:                                              



               00                                                

 

     Dose      2022-0      No                                      



     Unknown      3-18                                              



               00:00:                                              



               00                                                

 

     Dose      2022-0      No                                      



     Unknown      3-18                                              



               00:00:                                              



               00                                                

 

     Dose      2022-0      No                                      



     Unknown      3-18                                              



               00:00:                                              



               00                                                

 

     Dose      2022-0      No                                      



     Unknown      3-18                                              



               00:00:                                              



               00                                                

 

     Dose      2022-0      No                                      



     Unknown      3-18                                              



               00:00:                                              



               00                                                

 

     Dose      2022-0      No                                      



     Unknown      3-18                                              



               00:00:                                              



               00                                                

 

     Dose      2022-0      No                                      



     Unknown      3-18                                              



               00:00:                                              



               00                                                

 

     Dose      2022-0      No                                      



     Unknown      3-18                                              



               00:00:                                              



               00                                                

 

     Dose      2022-0      No                                      



     Unknown      3-18                                              



               00:00:                                              



               00                                                

 

     Dose      2022-0      No                                      



     Unknown      3-18                                              



               00:00:                                              



               00                                                

 

     Dose      2022-0      No                                      



     Unknown      3-18                                              



               00:00:                                              



               00                                                

 

     Dose      2022-0      No                                      



     Unknown      3-18                                              



               00:00:                                              



               00                                                

 

     Dose      2022-0      No                                      



     Unknown      3-18                                              



               00:00:                                              



               00                                                

 

     Dose      2022-0      No                                      



     Unknown      3-18                                              



               00:00:                                              



               00                                                

 

     Dose      2022-0      No                                      



     Unknown      3-18                                              



               00:00:                                              



               00                                                

 

     Dose      2022-0      No                                      



     Unknown      3-18                                              



               00:00:                                              



               00                                                

 

     Dose      2022-0      No                                      



     Unknown      3-18                                              



               00:00:                                              



               00                                                

 

     Dose      2022-0      No                                      



     Unknown      3-18                                              



               00:00:                                              



               00                                                

 

     Dose      2022-0      No                                      



     Unknown      3-18                                              



               00:00:                                              



               00                                                

 

     Dose      2022-0      No                                      



     Unknown      3-18                                              



               00:00:                                              



               00                                                

 

     Dose      2022-0      No                                      



     Unknown      3-18                                              



               00:00:                                              



               00                                                

 

     Dose      2022-0      No                                      



     Unknown      3-18                                              



               00:00:                                              



               00                                                

 

     Dose      2022-0      No                                      



     Unknown      3-18                                              



               00:00:                                              



               00                                                

 

     Dose      2022-0      No                                      



     Unknown      3-18                                              



               00:00:                                              



               00                                                

 

     Dose      2022-0      No                                      



     Unknown      3-18                                              



               00:00:                                              



               00                                                

 

     Dose      2022-0      No                                      



     Unknown      3-18                                              



               00:00:                                              



               00                                                

 

     Dose      2022-0      No                                      



     Unknown      3-18                                              



               00:00:                                              



               00                                                

 

     Dose      2022-0      No                                      



     Unknown      3-18                                              



               00:00:                                              



               00                                                

 

     Dose      2022-0      No                                      



     Unknown      3-18                                              



               00:00:                                              



               00                                                

 

     Dose      2022-0      No                                      



     Unknown      3-18                                              



               00:00:                                              



               00                                                

 

     Dose      2022-0      No                                      



     Unknown      3-18                                              



               00:00:                                              



               00                                                

 

     Dose      2022-0      No                                      



     Unknown      3-18                                              



               00:00:                                              



               00                                                

 

     Dose      2022-0      No                                      



     Unknown      3-18                                              



               00:00:                                              



               00                                                

 

     Dose      2022-0      No                                      



     Unknown      3-18                                              



               00:00:                                              



               00                                                

 

     Dose      2022-0      No                                      



     Unknown      3-18                                              



               00:00:                                              



               00                                                

 

     Dose      2022-0      No                                      



     Unknown      3-18                                              



               00:00:                                              



               00                                                

 

     Dose      2022-0      No                                      



     Unknown      3-18                                              



               00:00:                                              



               00                                                

 

     Dose      2022-0      No                                      



     Unknown      3-18                                              



               00:00:                                              



               00                                                

 

     Dose      2022-0      No                                      



     Unknown      3-18                                              



               00:00:                                              



               00                                                

 

     Dose      2022-0      No                                      



     Unknown      3-18                                              



               00:00:                                              



               00                                                

 

     Dose      2022-0      No                                      



     Unknown      3-18                                              



               00:00:                                              



               00                                                

 

     Dose      2022-0      No                                      



     Unknown      3-18                                              



               00:00:                                              



               00                                                

 

     Dose      2022-0      No                                      



     Unknown      3-18                                              



               00:00:                                              



               00                                                

 

     Dose      2022-0      No                                      



     Unknown      3-18                                              



               00:00:                                              



               00                                                

 

     Dose      2022-0      No                                      



     Unknown      3-18                                              



               00:00:                                              



               00                                                

 

     Dose      2022-0      No                                      



     Unknown      3-18                                              



               00:00:                                              



               00                                                

 

     Dose      2022-0      No                                      



     Unknown      3-18                                              



               00:00:                                              



               00                                                

 

     Dose      2022-0      No                                      



     Unknown      3-18                                              



               00:00:                                              



               00                                                

 

     Dose      2022-0      No                                      



     Unknown      3-18                                              



               00:00:                                              



               00                                                

 

     Dose      2022-0      No                                      



     Unknown      3-18                                              



               00:00:                                              



               00                                                

 

     Dose      2022-0      No                                      



     Unknown      3-18                                              



               00:00:                                              



               00                                                

 

     Dose      2022-0      No                                      



     Unknown      3-18                                              



               00:00:                                              



               00                                                

 

     Dose      2022-0      No                                      



     Unknown      3-18                                              



               00:00:                                              



               00                                                

 

     Dose      2022-0      No                                      



     Unknown      3-18                                              



               00:00:                                              



               00                                                

 

     Dose      2022-0      No                                      



     Unknown      3-18                                              



               00:00:                                              



               00                                                

 

     Dose      2022-0      No                                      



     Unknown      3-18                                              



               00:00:                                              



               00                                                

 

     Dose      2022-0      No                                      



     Unknown      3-18                                              



               00:00:                                              



               00                                                

 

     Dose      2022-0      No                                      



     Unknown      3-18                                              



               00:00:                                              



               00                                                

 

     Dose      2022-0      No                                      



     Unknown      3-18                                              



               00:00:                                              



               00                                                

 

     Dose      2022-0      No                                      



     Unknown      3-18                                              



               00:00:                                              



               00                                                

 

     Dose      2022-0      No                                      



     Unknown      3-18                                              



               00:00:                                              



               00                                                

 

     Dose      2022-0      No                                      



     Unknown      3-18                                              



               00:00:                                              



               00                                                

 

     Dose      2022-0      No                                      



     Unknown      3-18                                              



               00:00:                                              



               00                                                

 

     Dose      2022-0      No                                      



     Unknown      3-16                                              



               00:00:                                              



               00                                                

 

     Dose      2022-0      No                                      



     Unknown      3-16                                              



               00:00:                                              



               00                                                

 

     Dose      2022-0      No                                      



     Unknown      3-16                                              



               00:00:                                              



               00                                                

 

     Dose      2022-0      No                                      



     Unknown      3-16                                              



               00:00:                                              



               00                                                

 

     Dose      2022-0      No                                      



     Unknown      3-16                                              



               00:00:                                              



               00                                                

 

     Dose      2022-0      No                                      



     Unknown      3-16                                              



               00:00:                                              



               00                                                

 

     Dose      2022-0      No                                      



     Unknown      3-16                                              



               00:00:                                              



               00                                                

 

     Dose      2022-0      No                                      



     Unknown      3-16                                              



               00:00:                                              



               00                                                

 

     Dose      2022-0      No                                      



     Unknown      3-16                                              



               00:00:                                              



               00                                                

 

     Dose      2022-0      No                                      



     Unknown      3-16                                              



               00:00:                                              



               00                                                

 

     Dose      2022-0      No                                      



     Unknown      3-16                                              



               00:00:                                              



               00                                                

 

     Dose      2022-0      No                                      



     Unknown      3-16                                              



               00:00:                                              



               00                                                

 

     Dose      2022-0      No                                      



     Unknown      3-16                                              



               00:00:                                              



               00                                                

 

     Dose      2022-0      No                                      



     Unknown      3-16                                              



               00:00:                                              



               00                                                

 

     Dose      2022-0      No                                      



     Unknown      3-16                                              



               00:00:                                              



               00                                                

 

     Dose      2022-0      No                                      



     Unknown      3-16                                              



               00:00:                                              



               00                                                

 

     Dose      2022-0      No                                      



     Unknown      3-16                                              



               00:00:                                              



               00                                                

 

     Dose      2022-0      No                                      



     Unknown      3-16                                              



               00:00:                                              



               00                                                

 

     Dose      2022-0      No                                      



     Unknown      3-16                                              



               00:00:                                              



               00                                                

 

     Dose      2022-0      No                                      



     Unknown      3-16                                              



               00:00:                                              



               00                                                

 

     Dose      2022-0      No                                      



     Unknown      3-16                                              



               00:00:                                              



               00                                                

 

     Dose      2022-0      No                                      



     Unknown      3-16                                              



               00:00:                                              



               00                                                

 

     Dose      2022-0      No                                      



     Unknown      3-16                                              



               00:00:                                              



               00                                                

 

     Dose      2022-0      No                                      



     Unknown      3-16                                              



               00:00:                                              



               00                                                

 

     Dose      2022-0      No                                      



     Unknown      3-16                                              



               00:00:                                              



               00                                                

 

     Dose      2022-0      No                                      



     Unknown      3-16                                              



               00:00:                                              



               00                                                

 

     Dose      2022-0      No                                      



     Unknown      3-16                                              



               00:00:                                              



               00                                                

 

     Dose      2022-0      No                                      



     Unknown      3-16                                              



               00:00:                                              



               00                                                

 

     Dose      2022-0      No                                      



     Unknown      3-16                                              



               00:00:                                              



               00                                                

 

     Dose      2022-0      No                                      



     Unknown      3-16                                              



               00:00:                                              



               00                                                

 

     Dose      2022-0      No                                      



     Unknown      3-16                                              



               00:00:                                              



               00                                                

 

     Dose      2022-0      No                                      



     Unknown      3-16                                              



               00:00:                                              



               00                                                

 

     Dose      2022-0      No                                      



     Unknown      3-16                                              



               00:00:                                              



               00                                                

 

     Dose      2022-0      No                                      



     Unknown      3-16                                              



               00:00:                                              



               00                                                

 

     Dose      2022-0      No                                      



     Unknown      3-16                                              



               00:00:                                              



               00                                                

 

     Dose      2022-0      No                                      



     Unknown      3-16                                              



               00:00:                                              



               00                                                

 

     Dose      2022-0      No                                      



     Unknown      3-16                                              



               00:00:                                              



               00                                                

 

     Dose      2022-0      No                                      



     Unknown      3-16                                              



               00:00:                                              



               00                                                

 

     Dose      2022-0      No                                      



     Unknown      3-16                                              



               00:00:                                              



               00                                                

 

     Dose      2022-0      No                                      



     Unknown      3-16                                              



               00:00:                                              



               00                                                

 

     Dose      2022-0      No                                      



     Unknown      3-16                                              



               00:00:                                              



               00                                                

 

     Dose      2022-0      No                                      



     Unknown      3-16                                              



               00:00:                                              



               00                                                

 

     Dose      2022-0      No                                      



     Unknown      3-16                                              



               00:00:                                              



               00                                                

 

     Dose      2022-0      No                                      



     Unknown      3-16                                              



               00:00:                                              



               00                                                

 

     Dose      2022-0      No                                      



     Unknown      3-16                                              



               00:00:                                              



               00                                                

 

     Dose      2022-0      No                                      



     Unknown      3-16                                              



               00:00:                                              



               00                                                

 

     Dose      2022-0      No                                      



     Unknown      3-16                                              



               00:00:                                              



               00                                                

 

     Dose      2022-0      No                                      



     Unknown      3-16                                              



               00:00:                                              



               00                                                

 

     Dose      2022-0      No                                      



     Unknown      3-16                                              



               00:00:                                              



               00                                                

 

     Dose      2022-0      No                                      



     Unknown      3-16                                              



               00:00:                                              



               00                                                

 

     Dose      2022-0      No                                      



     Unknown      3-16                                              



               00:00:                                              



               00                                                

 

     Dose      2022-0      No                                      



     Unknown      3-16                                              



               00:00:                                              



               00                                                

 

     Dose      2022-0      No                                      



     Unknown      3-16                                              



               00:00:                                              



               00                                                

 

     Dose      2022-0      No                                      



     Unknown      3-16                                              



               00:00:                                              



               00                                                

 

     Dose      2022-0      No                                      



     Unknown      3-16                                              



               00:00:                                              



               00                                                

 

     Dose      2022-0      No                                      



     Unknown      3-16                                              



               00:00:                                              



               00                                                

 

     Dose      2022-0      No                                      



     Unknown      3-16                                              



               00:00:                                              



               00                                                

 

     Dose      2022-0      No                                      



     Unknown      3-16                                              



               00:00:                                              



               00                                                

 

     Dose      2022-0      No                                      



     Unknown      3-16                                              



               00:00:                                              



               00                                                

 

     Dose      2022-0      No                                      



     Unknown      3-16                                              



               00:00:                                              



               00                                                

 

     Dose      2022-0      No                                      



     Unknown      3-16                                              



               00:00:                                              



               00                                                

 

     Dose      2022-0      No                                      



     Unknown      3-16                                              



               00:00:                                              



               00                                                

 

     Dose      2022-0      No                                      



     Unknown      3-16                                              



               00:00:                                              



               00                                                

 

     Dose      2022-0      No                                      



     Unknown      3-16                                              



               00:00:                                              



               00                                                

 

     Dose      2022-0      No                                      



     Unknown      3-16                                              



               00:00:                                              



               00                                                

 

     Dose      2022-0      No                                      



     Unknown      3-16                                              



               00:00:                                              



               00                                                

 

     Dose      2022-0      No                                      



     Unknown      3-16                                              



               00:00:                                              



               00                                                

 

     Dose      2022-0      No                                      



     Unknown      3-16                                              



               00:00:                                              



               00                                                

 

     Dose      2022-0      No                                      



     Unknown      3-16                                              



               00:00:                                              



               00                                                

 

     Dose      2022-0      No                                      



     Unknown      3-16                                              



               00:00:                                              



               00                                                

 

     Dose      2022-0      No                                      



     Unknown      3-16                                              



               00:00:                                              



               00                                                

 

     Dose      2022-0      No                                      



     Unknown      3-16                                              



               00:00:                                              



               00                                                

 

     Dose      2022-0      No                                      



     Unknown      3-11                                              



               00:00:                                              



               00                                                

 

     Dose      2022-0      No                                      



     Unknown      3-11                                              



               00:00:                                              



               00                                                

 

     Dose      2022-0      No                                      



     Unknown      3-11                                              



               00:00:                                              



               00                                                

 

     Dose      2022-0      No                                      



     Unknown      3-11                                              



               00:00:                                              



               00                                                

 

     Dose      2022-0      No                                      



     Unknown      3-11                                              



               00:00:                                              



               00                                                

 

     Dose      2022-0      No                                      



     Unknown      3-11                                              



               00:00:                                              



               00                                                

 

     Dose      2022-0      No                                      



     Unknown      3-11                                              



               00:00:                                              



               00                                                

 

     Dose      2022-0      No                                      



     Unknown      3-11                                              



               00:00:                                              



               00                                                

 

     Dose      2022-0      No                                      



     Unknown      3-11                                              



               00:00:                                              



               00                                                

 

     Dose      2022-0      No                                      



     Unknown      3-11                                              



               00:00:                                              



               00                                                

 

     Dose      2022-0      No                                      



     Unknown      3-11                                              



               00:00:                                              



               00                                                

 

     Dose      2022-0      No                                      



     Unknown      3-11                                              



               00:00:                                              



               00                                                

 

     Dose      2022-0      No                                      



     Unknown      3-11                                              



               00:00:                                              



               00                                                

 

     Dose      2022-0      No                                      



     Unknown      3-11                                              



               00:00:                                              



               00                                                

 

     Dose      2022-0      No                                      



     Unknown      3-11                                              



               00:00:                                              



               00                                                

 

     Dose      2022-0      No                                      



     Unknown      3-11                                              



               00:00:                                              



               00                                                

 

     Dose      2022-0      No                                      



     Unknown      3-11                                              



               00:00:                                              



               00                                                

 

     Dose      2022-0      No                                      



     Unknown      3-11                                              



               00:00:                                              



               00                                                

 

     Dose      2022-0      No                                      



     Unknown      3-11                                              



               00:00:                                              



               00                                                

 

     Dose      2022-0      No                                      



     Unknown      3-11                                              



               00:00:                                              



               00                                                

 

     Dose      2022-0      No                                      



     Unknown      3-11                                              



               00:00:                                              



               00                                                

 

     Dose      2022-0      No                                      



     Unknown      3-11                                              



               00:00:                                              



               00                                                

 

     Dose      2022-0      No                                      



     Unknown      3-11                                              



               00:00:                                              



               00                                                

 

     Dose      2022-0      No                                      



     Unknown      3-11                                              



               00:00:                                              



               00                                                

 

     Dose      2022-0      No                                      



     Unknown      2-25                                              



               00:00:                                              



               00                                                

 

     Dose      2022-0      No                                      



     Unknown      2-25                                              



               00:00:                                              



               00                                                

 

     Dose      2022-0      No                                      



     Unknown      2-25                                              



               00:00:                                              



               00                                                

 

     Dose      2022-0      No                                      



     Unknown      2-25                                              



               00:00:                                              



               00                                                

 

     Dose      2022-0      No                                      



     Unknown      2-25                                              



               00:00:                                              



               00                                                

 

     Dose      2022-0      No                                      



     Unknown      2-25                                              



               00:00:                                              



               00                                                

 

     Dose      2022-0      No                                      



     Unknown      2-25                                              



               00:00:                                              



               00                                                

 

     Dose      2022-0      No                                      



     Unknown      2-25                                              



               00:00:                                              



               00                                                

 

     Dose      2022-0      No                                      



     Unknown      2-25                                              



               00:00:                                              



               00                                                

 

     Dose      2022-0      No                                      



     Unknown      2-25                                              



               00:00:                                              



               00                                                

 

     Dose      2022-0      No                                      



     Unknown      2-25                                              



               00:00:                                              



               00                                                

 

     Dose      2022-0      No                                      



     Unknown      2-25                                              



               00:00:                                              



               00                                                

 

     Dose      2022-0      No                                      



     Unknown      2-25                                              



               00:00:                                              



               00                                                

 

     Dose      2022-0      No                                      



     Unknown      2-25                                              



               00:00:                                              



               00                                                

 

     Dose      2022-0      No                                      



     Unknown      2-25                                              



               00:00:                                              



               00                                                

 

     Dose      2022-0      No                                      



     Unknown      2-25                                              



               00:00:                                              



               00                                                

 

     Dose      2022-0      No                                      



     Unknown      2-24                                              



               00:00:                                              



               00                                                

 

     Dose      2022-0      No                                      



     Unknown      2-24                                              



               00:00:                                              



               00                                                

 

     Dose      2022-0      No                                      



     Unknown      2-24                                              



               00:00:                                              



               00                                                

 

     Dose      2022-0      No                                      



     Unknown      2-24                                              



               00:00:                                              



               00                                                

 

     Dose      2022-0      No                                      



     Unknown      2-24                                              



               00:00:                                              



               00                                                

 

     Dose      2022-0      No                                      



     Unknown      2-24                                              



               00:00:                                              



               00                                                

 

     Dose      2022-0      No                                      



     Unknown      2-24                                              



               00:00:                                              



               00                                                

 

     Dose      2022-0      No                                      



     Unknown      2-24                                              



               00:00:                                              



               00                                                

 

     Dose      2022-0      No                                      



     Unknown      2-24                                              



               00:00:                                              



               00                                                

 

     Dose      2022-0      No                                      



     Unknown      2-24                                              



               00:00:                                              



               00                                                

 

     Dose      2022-0      No                                      



     Unknown      2-24                                              



               00:00:                                              



               00                                                

 

     Dose      2022-0      No                                      



     Unknown      2-24                                              



               00:00:                                              



               00                                                

 

     Dose      2022-0      No                                      



     Unknown      2-24                                              



               00:00:                                              



               00                                                

 

     Dose      2022-0      No                                      



     Unknown      2-24                                              



               00:00:                                              



               00                                                

 

     Dose      2022-0      No                                      



     Unknown      2-24                                              



               00:00:                                              



               00                                                

 

     Dose      2022-0      No                                      



     Unknown      2-24                                              



               00:00:                                              



               00                                                

 

     Dose      2022-0      No                                      



     Unknown      2-24                                              



               00:00:                                              



               00                                                

 

     Dose      2022-0      No                                      



     Unknown      2-24                                              



               00:00:                                              



               00                                                

 

     Dose      2022-0      No                                      



     Unknown      2-24                                              



               00:00:                                              



               00                                                

 

     Dose      2022-0      No                                      



     Unknown      2-24                                              



               00:00:                                              



               00                                                

 

     Dose      2022-0      No                                      



     Unknown      2-24                                              



               00:00:                                              



               00                                                

 

     Dose      2022-0      No                                      



     Unknown      2-24                                              



               00:00:                                              



               00                                                

 

     Dose      2022-0      No                                      



     Unknown      2-24                                              



               00:00:                                              



               00                                                

 

     Dose      2022-0      No                                      



     Unknown      2-24                                              



               00:00:                                              



               00                                                

 

     Dose      2022-0      No                                      



     Unknown      2-24                                              



               00:00:                                              



               00                                                

 

     Dose      2022-0      No                                      



     Unknown      2-24                                              



               00:00:                                              



               00                                                

 

     Dose      2022-0      No                                      



     Unknown      2-24                                              



               00:00:                                              



               00                                                

 

     Dose      2022-0      No                                      



     Unknown      2-24                                              



               00:00:                                              



               00                                                

 

     Dose      2022-0      No                                      



     Unknown      2-24                                              



               00:00:                                              



               00                                                

 

     Dose      2022-0      No                                      



     Unknown      2-24                                              



               00:00:                                              



               00                                                

 

     Dose      2022-0      No                                      



     Unknown      2-24                                              



               00:00:                                              



               00                                                

 

     Dose      2022-0      No                                      



     Unknown      2-24                                              



               00:00:                                              



               00                                                

 

     Dose      2022-0      No                                      



     Unknown      2-24                                              



               00:00:                                              



               00                                                

 

     Dose      2022-0      No                                      



     Unknown      2-24                                              



               00:00:                                              



               00                                                

 

     Dose      2022-0      No                                      



     Unknown      2-24                                              



               00:00:                                              



               00                                                

 

     Dose      2022-0      No                                      



     Unknown      2-24                                              



               00:00:                                              



               00                                                

 

     Dose      2022-0      No                                      



     Unknown      2-24                                              



               00:00:                                              



               00                                                

 

     Dose      2022-0      No                                      



     Unknown      2-24                                              



               00:00:                                              



               00                                                

 

     Dose      2022-0      No                                      



     Unknown      2-24                                              



               00:00:                                              



               00                                                

 

     Dose      2022-0      No                                      



     Unknown      2-24                                              



               00:00:                                              



               00                                                

 

     Dose      2022-0      No                                      



     Unknown      2-23                                              



               00:00:                                              



               00                                                

 

     Dose      2022-0      No                                      



     Unknown      2-23                                              



               00:00:                                              



               00                                                

 

     Dose      2022-0      No                                      



     Unknown      2-23                                              



               00:00:                                              



               00                                                

 

     Dose      2022-0      No                                      



     Unknown      2-23                                              



               00:00:                                              



               00                                                

 

     Dose      2-0      No                                      



     Unknown      2-23                                              



               00:00:                                              



               00                                                

 

     Dose      2-0      No                                      



     Unknown      2-23                                              



               00:00:                                              



               00                                                

 

     Dose      2-0      No                                      



     Unknown      2-23                                              



               00:00:                                              



               00                                                

 

     Dose      2-0      No                                      



     Unknown      2-23                                              



               00:00:                                              



               00                                                

 

     Dose      2-0      No                                      



     Unknown      2-23                                              



               00:00:                                              



               00                                                

 

     Dose      2-0      No                                      



     Unknown      2-23                                              



               00:00:                                              



               00                                                

 

     Dose      2-0      No                                      



     Unknown      2-23                                              



               00:00:                                              



               00                                                

 

     Dose      2-0      No                                      



     Unknown      2-23                                              



               00:00:                                              



               00                                                

 

     Dose      -0      No                                      



     Unknown      2-23                                              



               00:00:                                              



               00                                                

 

     Dose      -0      No                                      



     Unknown      2-23                                              



               00:00:                                              



               00                                                

 

     Dose      -0      No                                      



     Unknown      2-23                                              



               00:00:                                              



               00                                                

 

     Dose      -0      No                                      



     Unknown      2-23                                              



               00:00:                                              



               00                                                

 

     Dose      -0      No                                      



     Unknown      2-23                                              



               00:00:                                              



               00                                                

 

     Dose      2-0      No                                      



     Unknown      2-23                                              



               00:00:                                              



               00                                                

 

     Dose      -0      No                                      



     Unknown      2-23                                              



               00:00:                                              



               00                                                

 

     Dose      -0      No                                      



     Unknown      2-23                                              



               00:00:                                              



               00                                                

 

     Dose      -0      No                                      



     Unknown      2-22                                              



               00:00:                                              



               00                                                

 

     Dose      -0      No                                      



     Unknown      2-22                                              



               00:00:                                              



               00                                                

 

     Dose      -0      No                                      



     Unknown      2-22                                              



               00:00:                                              



               00                                                

 

     Dose      -0      No                                      



     Unknown      2-22                                              



               00:00:                                              



               00                                                

 

     Dose      -0      No                                      



     Unknown      2-22                                              



               00:00:                                              



               00                                                

 

     Dose      2-0      No                                      



     Unknown      2-22                                              



               00:00:                                              



               00                                                

 

     Dose      2-0      No                                      



     Unknown      2-22                                              



               00:00:                                              



               00                                                

 

     Dose      -0      No                                      



     Unknown      2-22                                              



               00:00:                                              



               00                                                

 

     Saccharomyc      -0      Yes       [id number] 250mg      Take 1         

  [facility]



     es        1-20                               capsule by           ity of



     boulardii      00:00:                               mouth 2           [place]



     (FLORASTOR)      00                                 (two)           [facility]



     250 mg                                         times           [facility]



     capsule                                         daily.           

 

     ondansetron            Yes       [id number] 4mg       Take 1          

 [facility]



     (ZOFRAN      1-20                               tablet by           ity of



     ODT) 4 mg      00:00:                               mouth           [place]



     disintegrat      00                                 every 8           [facility]

al



     ing tablet                                         (eight)           [facility]



                                                  hours as           



                                                  needed for           



                                                  Nausea and           



                                                  Vomiting           



                                                  (N/V).           

 

     dicyclomine      2022-0      Yes       [id number] 10mg      Take 1          

 [facility]



     10 mg      1-20                               capsule by           ity of



     capsule      00:00:                               mouth 4           [place]



               00                                 (four)           [facility]



                                                  times           [facility]



                                                  daily as           



                                                  needed for           



                                                  Abdominal           



                                                  pain.           

 

     Saccharomyc      2-0      Yes       [id number] 250mg      Take 1         

  [facility]



     es        1-20                               capsule by           ity of



     boulardii      00:00:                               mouth 2           [place]



     (FLORASTOR)      00                                 (two)           [facility]



     250 mg                                         times           [facility]



     capsule                                         daily.           

 

     ondansetron      2-0      Yes       [id number] 4mg       Take 1          

 [facility]



     (ZOFRAN      1-20                               tablet by           ity of



     ODT) 4 mg      00:00:                               mouth           [place]



     disintegrat      00                                 every 8           [facility]

al



     ing tablet                                         (eight)           [facility]



                                                  hours as           



                                                  needed for           



                                                  Nausea and           



                                                  Vomiting           



                                                  (N/V).           

 

     dicyclomine      2-0      Yes       [id number] 10mg      Take 1          

 [facility]



     10 mg      1-20                               capsule by           ity of



     capsule      00:00:                               mouth 4           [place]



               00                                 (four)           [facility]



                                                  times           [facility]



                                                  daily as           



                                                  needed for           



                                                  Abdominal           



                                                  pain.           

 

     Saccharomyc      -0      Yes       [id number] 250mg      Take 1         

  [facility]



     es        1-20                               capsule by           [facility] of



     boulardii      00:00:                               mouth 2           [place]



     (FLORASTOR)      00                                 (two)           [facility]



     250 mg                                         times           [facility]



     capsule                                         daily.           

 

     ondansetron      2-0      Yes       [id number] 4mg       Take 1          

 [facility]



     (ZOFRAN      1-20                               tablet by           ity of



     ODT) 4 mg      00:00:                               mouth           [place]



     disintegrat      00                                 every 8           [facility]

al



     ing tablet                                         (eight)           [facility]



                                                  hours as           



                                                  needed for           



                                                  Nausea and           



                                                  Vomiting           



                                                  (N/V).           

 

     dicyclomine      2-0      Yes       [id number] 10mg      Take 1          

 [facility]



     10 mg      1-20                               capsule by           [facility] of



     capsule      00:00:                               mouth 4           [place]



               00                                 (four)           [facility]



                                                  times           [facility]



                                                  daily as           



                                                  needed for           



                                                  Abdominal           



                                                  pain.           

 

     Saccharomyc      2-0      Yes       [id number] 250mg      Take 1         

  [facility]



     es        1-20                               capsule by           ity of



     boulardii      00:00:                               mouth 2           [place]



     (FLORASTOR)      00                                 (two)           [facility]



     250 mg                                         times           [facility]



     capsule                                         daily.           

 

     ondansetron      2022-0      Yes       [id number] 4mg       Take 1          

 [facility]



     (ZOFRAN      1-20                               tablet by           [facility] of



     ODT) 4 mg      00:00:                               mouth           [place]



     disintegrat      00                                 every 8           [facility]

al



     ing tablet                                         (eight)           [facility]



                                                  hours as           



                                                  needed for           



                                                  Nausea and           



                                                  Vomiting           



                                                  (N/V).           

 

     dicyclomine      2022-0      Yes       [id number] 10mg      Take 1          

 [facility]



     10 mg      1-20                               capsule by           [facility] of



     capsule      00:00:                               mouth 4           [place]



               00                                 (four)           [facility]



                                                  times           [facility]



                                                  daily as           



                                                  needed for           



                                                  Abdominal           



                                                  pain.           

 

     Saccharomyc      2022-0      Yes       [id number] 250mg      Take 1         

  [facility]



     es        1-20                               capsule by           ity of



     boulardii      00:00:                               mouth 2           [place]



     (FLORASTOR)      00                                 (two)           [facility]



     250 mg                                         times           [facility]



     capsule                                         daily.           

 

     ondansetron      2022-0      Yes       [id number] 4mg       Take 1          

 [facility]



     (ZOFRAN      1-20                               tablet by           [facility] of



     ODT) 4 mg      00:00:                               mouth           [place]



     disintegrat      00                                 every 8           [facility]

al



     ing tablet                                         (eight)           [facility]



                                                  hours as           



                                                  needed for           



                                                  Nausea and           



                                                  Vomiting           



                                                  (N/V).           

 

     dicyclomine      2-0      Yes       [id number] 10mg      Take 1          

 [facility]



     10 mg      1-20                               capsule by           ity of



     capsule      00:00:                               mouth 4           [place]



               00                                 (four)           [facility]



                                                  times           [facility]



                                                  daily as           



                                                  needed for           



                                                  Abdominal           



                                                  pain.           

 

     Saccharomyc      2-0      Yes       [id number] 250mg      Take 1         

  [facility]



     es        1-20                               capsule by           ity of



     boulardii      00:00:                               mouth 2           [place]



     (FLORASTOR)      00                                 (two)           [facility]



     250 mg                                         times           [facility]



     capsule                                         daily.           

 

     ondansetron      -0      Yes       [id number] 4mg       Take 1          

 [facility]



     (ZOFRAN      1-20                               tablet by           ity of



     ODT) 4 mg      00:00:                               mouth           [place]



     disintegrat      00                                 every 8           [facility]

al



     ing tablet                                         (eight)           [facility]



                                                  hours as           



                                                  needed for           



                                                  Nausea and           



                                                  Vomiting           



                                                  (N/V).           

 

     dicyclomine      2-0      Yes       [id number] 10mg      Take 1          

 [facility]



     10 mg      1-20                               capsule by           [facility] of



     capsule      00:00:                               mouth 4           [place]



               00                                 (four)           [facility]



                                                  times           [facility]



                                                  daily as           



                                                  needed for           



                                                  Abdominal           



                                                  pain.           

 

     Saccharomyc      2022-0      Yes       [id number] 250mg      Take 1         

  [facility]



     es        1-20                               capsule by           ity of



     boulardii      00:00:                               mouth 2           [place]



     (FLORASTOR)      00                                 (two)           [facility]



     250 mg                                         times           [facility]



     capsule                                         daily.           

 

     ondansetron      2022-0      Yes       [id number] 4mg       Take 1          

 [facility]



     (ZOFRAN      1-20                               tablet by           [facility] of



     ODT) 4 mg      00:00:                               mouth           [place]



     disintegrat      00                                 every 8           [facility]

al



     ing tablet                                         (eight)           [facility]



                                                  hours as           



                                                  needed for           



                                                  Nausea and           



                                                  Vomiting           



                                                  (N/V).           

 

     dicyclomine      2022-0      Yes       [id number] 10mg      Take 1          

 [facility]



     10 mg      1-20                               capsule by           [facility] of



     capsule      00:00:                               mouth 4           [place]



               00                                 (four)           [facility]



                                                  times           [facility]



                                                  daily as           



                                                  needed for           



                                                  Abdominal           



                                                  pain.           

 

     Saccharomyc      2022-0      Yes       [id number] 250mg      Take 1         

  [facility]



     es        1-20                               capsule by           ity of



     boulardii      00:00:                               mouth 2           [place]



     (FLORASTOR)      00                                 (two)           [facility]



     250 mg                                         times           [facility]



     capsule                                         daily.           

 

     ondansetron      2022-0      Yes       [id number] 4mg       Take 1          

 [facility]



     (ZOFRAN      1-20                               tablet by           [facility] of



     ODT) 4 mg      00:00:                               mouth           [place]



     disintegrat      00                                 every 8           [facility]

al



     ing tablet                                         (eight)           [facility]



                                                  hours as           



                                                  needed for           



                                                  Nausea and           



                                                  Vomiting           



                                                  (N/V).           

 

     dicyclomine      2022-0      Yes       [id number] 10mg      Take 1          

 [facility]



     10 mg      1-20                               capsule by           [facility] of



     capsule      00:00:                               mouth 4           [place]



               00                                 (four)           [facility]



                                                  times           [facility]



                                                  daily as           



                                                  needed for           



                                                  Abdominal           



                                                  pain.           

 

     Saccharomyc      2-0      Yes       [id number] 250mg      Take 1         

  [facility]



     es        1-20                               capsule by           ity of



     boulardii      00:00:                               mouth 2           [place]



     (FLORASTOR)      00                                 (two)           [facility]



     250 mg                                         times           [facility]



     capsule                                         daily.           

 

     ondansetron      2-0      Yes       [id number] 4mg       Take 1          

 [facility]



     (ZOFRAN      1-20                               tablet by           [facility] of



     ODT) 4 mg      00:00:                               mouth           [place]



     disintegrat      00                                 every 8           [facility]

al



     ing tablet                                         (eight)           [facility]



                                                  hours as           



                                                  needed for           



                                                  Nausea and           



                                                  Vomiting           



                                                  (N/V).           

 

     dicyclomine      2022-0      Yes       [id number] 10mg      Take 1          

 [facility]



     10 mg      1-20                               capsule by           ity of



     capsule      00:00:                               mouth 4           [place]



               00                                 (four)           [facility]



                                                  times           [facility]



                                                  daily as           



                                                  needed for           



                                                  Abdominal           



                                                  pain.           

 

     Saccharomyc      2022-0      Yes       [id number] 250mg      Take 1         

  [facility]



     es        1-20                               capsule by           ity of



     boulardii      00:00:                               mouth 2           [place]



     (FLORASTOR)      00                                 (two)           [facility]



     250 mg                                         times           [facility]



     capsule                                         daily.           

 

     ondansetron      2022-0      Yes       [id number] 4mg       Take 1          

 [facility]



     (ZOFRAN      1-20                               tablet by           [facility] of



     ODT) 4 mg      00:00:                               mouth           [place]



     disintegrat      00                                 every 8           [facility]

al



     ing tablet                                         (eight)           [facility]



                                                  hours as           



                                                  needed for           



                                                  Nausea and           



                                                  Vomiting           



                                                  (N/V).           

 

     dicyclomine      2022-0      Yes       [id number] 10mg      Take 1          

 [facility]



     10 mg      1-20                               capsule by           ity of



     capsule      00:00:                               mouth 4           [place]



               00                                 (four)           [facility]



                                                  times           [facility]



                                                  daily as           



                                                  needed for           



                                                  Abdominal           



                                                  pain.           

 

     Saccharomyc      2022-0      Yes       [id number] 250mg      Take 1         

  [facility]



     es        1-20                               capsule by           ity of



     boulardii      00:00:                               mouth 2           [place]



     (FLORASTOR)      00                                 (two)           [facility]



     250 mg                                         times           [facility]



     capsule                                         daily.           

 

     ondansetron      2022-0      Yes       [id number] 4mg       Take 1          

 [facility]



     (ZOFRAN      1-20                               tablet by           [facility] of



     ODT) 4 mg      00:00:                               mouth           [place]



     disintegrat      00                                 every 8           [facility]

al



     ing tablet                                         (eight)           [facility]



                                                  hours as           



                                                  needed for           



                                                  Nausea and           



                                                  Vomiting           



                                                  (N/V).           

 

     dicyclomine      2022-0      Yes       [id number] 10mg      Take 1          

 [facility]



     10 mg      1-20                               capsule by           [facility] of



     capsule      00:00:                               mouth 4           [place]



               00                                 (four)           [facility]



                                                  times           [facility]



                                                  daily as           



                                                  needed for           



                                                  Abdominal           



                                                  pain.           

 

     Saccharomyc      2-0      Yes       [id number] 250mg      Take 1         

  [facility]



     es        1-20                               capsule by           ity of



     boulardii      00:00:                               mouth 2           [place]



     (FLORASTOR)      00                                 (two)           [facility]



     250 mg                                         times           [facility]



     capsule                                         daily.           

 

     ondansetron      2-0      Yes       [id number] 4mg       Take 1          

 [facility]



     (ZOFRAN      1-20                               tablet by           [facility] of



     ODT) 4 mg      00:00:                               mouth           [place]



     disintegrat      00                                 every 8           [facility]

al



     ing tablet                                         (eight)           [facility]



                                                  hours as           



                                                  needed for           



                                                  Nausea and           



                                                  Vomiting           



                                                  (N/V).           

 

     dicyclomine      2022-0      Yes       [id number] 10mg      Take 1          

 [facility]



     10 mg      1-20                               capsule by           [facility] of



     capsule      00:00:                               mouth 4           [place]



               00                                 (four)           [facility]



                                                  times           [facility]



                                                  daily as           



                                                  needed for           



                                                  Abdominal           



                                                  pain.           

 

     Saccharomyc      2022-0      Yes       [id number] 250mg      Take 1         

  [facility]



     es        1-20                               capsule by           ity of



     boulardii      00:00:                               mouth 2           [place]



     (FLORASTOR)      00                                 (two)           [facility]



     250 mg                                         times           [facility]



     capsule                                         daily.           

 

     ondansetron      2022-0      Yes       [id number] 4mg       Take 1          

 [facility]



     (ZOFRAN      1-20                               tablet by           [facility] of



     ODT) 4 mg      00:00:                               mouth           [place]



     disintegrat      00                                 every 8           [facility]

al



     ing tablet                                         (eight)           [facility]



                                                  hours as           



                                                  needed for           



                                                  Nausea and           



                                                  Vomiting           



                                                  (N/V).           

 

     dicyclomine      2022-0      Yes       [id number] 10mg      Take 1          

 [facility]



     10 mg      1-20                               capsule by           ity of



     capsule      00:00:                               mouth 4           [place]



               00                                 (four)           [facility]



                                                  times           [facility]



                                                  daily as           



                                                  needed for           



                                                  Abdominal           



                                                  pain.           

 

     Saccharomyc      2022-0      Yes       [id number] 250mg      Take 1         

  [facility]



     es        1-20                               capsule by           ity of



     boulardii      00:00:                               mouth 2           [place]



     (FLORASTOR)      00                                 (two)           [facility]



     250 mg                                         times           [facility]



     capsule                                         daily.           

 

     ondansetron      2022-0      Yes       [id number] 4mg       Take 1          

 [facility]



     (ZOFRAN      1-20                               tablet by           [facility] of



     ODT) 4 mg      00:00:                               mouth           [place]



     disintegrat      00                                 every 8           [facility]

al



     ing tablet                                         (eight)           [facility]



                                                  hours as           



                                                  needed for           



                                                  Nausea and           



                                                  Vomiting           



                                                  (N/V).           

 

     dicyclomine      2022-0      Yes       [id number] 10mg      Take 1          

 [facility]



     10 mg      1-20                               capsule by           ity of



     capsule      00:00:                               mouth 4           [place]



               00                                 (four)           [facility]



                                                  times           [facility]



                                                  daily as           



                                                  needed for           



                                                  Abdominal           



                                                  pain.           

 

     Saccharomyc      2022-0      Yes       [id number] 250mg      Take 1         

  [facility]



     es        1-20                               capsule by           [facility] of



     boulardii      00:00:                               mouth 2           [place]



     (FLORASTOR)      00                                 (two)           [facility]



     250 mg                                         times           [facility]



     capsule                                         daily.           

 

     ondansetron      2022-0      Yes       [id number] 4mg       Take 1          

 [facility]



     (ZOFRAN      1-20                               tablet by           [facility] of



     ODT) 4 mg      00:00:                               mouth           [place]



     disintegrat      00                                 every 8           [facility]

al



     ing tablet                                         (eight)           [facility]



                                                  hours as           



                                                  needed for           



                                                  Nausea and           



                                                  Vomiting           



                                                  (N/V).           

 

     dicyclomine      2022-0      Yes       [id number] 10mg      Take 1          

 [facility]



     10 mg      1-20                               capsule by           [facility] of



     capsule      00:00:                               mouth 4           [place]



               00                                 (four)           [facility]



                                                  times           [facility]



                                                  daily as           



                                                  needed for           



                                                  Abdominal           



                                                  pain.           

 

     Saccharomyc      2022-0      Yes       [id number] 250mg      Take 1         

  [facility]



     es        1-20                               capsule by           [facility] of



     boulardii      00:00:                               mouth 2           [place]



     (FLORASTOR)      00                                 (two)           [facility]



     250 mg                                         times           [facility]



     capsule                                         daily.           

 

     ondansetron      2022-0      Yes       [id number] 4mg       Take 1          

 [facility]



     (ZOFRAN      1-20                               tablet by           [facility] of



     ODT) 4 mg      00:00:                               mouth           [place]



     disintegrat      00                                 every 8           [facility]

al



     ing tablet                                         (eight)           [facility]



                                                  hours as           



                                                  needed for           



                                                  Nausea and           



                                                  Vomiting           



                                                  (N/V).           

 

     dicyclomine      2022-0      Yes       [id number] 10mg      Take 1          

 [facility]



     10 mg      1-20                               capsule by           [facility]



     capsule      00:00:                               mouth 4           [place]



               00                                 (four)           [facility]



                                                  times           [facility]



                                                  daily as           



                                                  needed for           



                                                  Abdominal           



                                                  pain.           

 

     Saccharomyc      2022-0      Yes       [id number] 250mg      Take 1         

  [facility]



     es        1-20                               capsule by           [facility] of



     boulardii      00:00:                               mouth 2           [place]



     (FLORASTOR)      00                                 (two)           [facility]



     250 mg                                         times           [facility]



     capsule                                         daily.           

 

     ondansetron      2022-0      Yes       [id number] 4mg       Take 1          

 [facility]



     (ZOFRAN      1-20                               tablet by           [facility] of



     ODT) 4 mg      00:00:                               mouth           [place]



     disintegrat      00                                 every 8           [facility]

al



     ing tablet                                         (eight)           [facility]



                                                  hours as           



                                                  needed for           



                                                  Nausea and           



                                                  Vomiting           



                                                  (N/V).           

 

     dicyclomine      2022-0      Yes       [id number] 10mg      Take 1          

 [facility]



     10 mg      1-20                               capsule by           [facility] of



     capsule      00:00:                               mouth 4           [place]



               00                                 (four)           [facility]



                                                  times           [facility]



                                                  daily as           



                                                  needed for           



                                                  Abdominal           



                                                  pain.           

 

     Saccharomyc      2022-0      Yes       [id number] 250mg      Take 1         

  [facility]



     es        1-20                               capsule by           ity of



     boulardii      00:00:                               mouth 2           [place]



     (FLORASTOR)      00                                 (two)           [facility]



     250 mg                                         times           [facility]



     capsule                                         daily.           

 

     ondansetron      2022-0      Yes       [id number] 4mg       Take 1          

 [facility]



     (ZOFRAN      1-20                               tablet by           [facility] of



     ODT) 4 mg      00:00:                               mouth           [place]



     disintegrat      00                                 every 8           [facility]

al



     ing tablet                                         (eight)           [facility]



                                                  hours as           



                                                  needed for           



                                                  Nausea and           



                                                  Vomiting           



                                                  (N/V).           

 

     dicyclomine      2022-0      Yes       [id number] 10mg      Take 1          

 [facility]



     10 mg      1-20                               capsule by           [facility] of



     capsule      00:00:                               mouth 4           [place]



               00                                 (four)           [facility]



                                                  times           [facility]



                                                  daily as           



                                                  needed for           



                                                  Abdominal           



                                                  pain.           

 

     Saccharomyc      2022-0      Yes       [id number] 250mg      Take 1         

  [facility]



     es        1-20                               capsule by           [facility] of



     boulardii      00:00:                               mouth 2           [place]



     (FLORASTOR)      00                                 (two)           Medical



     250 mg                                         times           [facility]



     capsule                                         daily.           

 

     ondansetron      2022-0      Yes       [id number] 4mg       Take 1          

 [facility]



     (ZOFRAN      1-20                               tablet by           [facility] of



     ODT) 4 mg      00:00:                               mouth           [place]



     disintegrat      00                                 every 8           Med[facility]

al



     ing tablet                                         (eight)           [facility]



                                                  hours as           



                                                  needed for           



                                                  Nausea and           



                                                  Vomiting           



                                                  (N/V).           

 

     dicyclomine      2022-0      Yes       [id number] 10mg      Take 1          

 [facility]



     10 mg      1-20                               capsule by           [facility] of



     capsule      00:00:                               mouth 4           [place]



               00                                 (four)           [facility]



                                                  times           [facility]



                                                  daily as           



                                                  needed for           



                                                  Abdominal           



                                                  pain.           

 

     Saccharomyc      2022-0      Yes       [id number] 250mg      Take 1         

  [facility]



     es        1-20                               capsule by           [facility] of



     boulardii      00:00:                               mouth 2           [place]



     (FLORASTOR)      00                                 (two)           Medical



     250 mg                                         times           [facility]



     capsule                                         daily.           

 

     ondansetron      2022-0      Yes       [id number] 4mg       Take 1          

 [facility]



     (ZOFRAN      1-20                               tablet by           [facility] of



     ODT) 4 mg      00:00:                               mouth           [place]



     disintegrat      00                                 every 8           [facility]

al



     ing tablet                                         (eight)           [facility]



                                                  hours as           



                                                  needed for           



                                                  Nausea and           



                                                  Vomiting           



                                                  (N/V).           

 

     dicyclomine      2022-0      Yes       [id number] 10mg      Take 1          

 [facility]



     10 mg      1-20                               capsule by           [facility] of



     capsule      00:00:                               mouth 4           [place]



               00                                 (four)           [facility]



                                                  times           [facility]



                                                  daily as           



                                                  needed for           



                                                  Abdominal           



                                                  pain.           

 

     Saccharomyc      2022-0      Yes       [id number] 250mg      Take 1         

  [facility]



     es        1-20                               capsule by           [facility] of



     boulardii      00:00:                               mouth 2           [place]



     (FLORASTOR)      00                                 (two)           [facility]



     250 mg                                         times           [facility]



     capsule                                         daily.           

 

     ondansetron      2022-0      Yes       [id number] 4mg       Take 1          

 [facility]



     (ZOFRAN      1-20                               tablet by           [facility] of



     ODT) 4 mg      00:00:                               mouth           [place]



     disintegrat      00                                 every 8           Med[facility]

al



     ing tablet                                         (eight)           [facility]



                                                  hours as           



                                                  needed for           



                                                  Nausea and           



                                                  Vomiting           



                                                  (N/V).           

 

     dicyclomine      2022-0      Yes       [id number] 10mg      Take 1          

 [facility]



     10 mg      1-20                               capsule by           [facility] of



     capsule      00:00:                               mouth 4           [place]



               00                                 (four)           [facility]



                                                  times           [facility]



                                                  daily as           



                                                  needed for           



                                                  Abdominal           



                                                  pain.           

 

     Saccharomyc      2022-0      Yes       [id number] 250mg      Take 1         

  [facility]



     es        1-20                               capsule by           [facility] of



     boulardii      00:00:                               mouth 2           [place]



     (FLORASTOR)      00                                 (two)           Medical



     250 mg                                         times           [facility]



     capsule                                         daily.           

 

     ondansetron      2022-0      Yes       [id number] 4mg       Take 1          

 [facility]



     (ZOFRAN      1-20                               tablet by           [facility] of



     ODT) 4 mg      00:00:                               mouth           [place]



     disintegrat      00                                 every 8           Med[facility]

al



     ing tablet                                         (eight)           [facility]



                                                  hours as           



                                                  needed for           



                                                  Nausea and           



                                                  Vomiting           



                                                  (N/V).           

 

     dicyclomine      2022-0      Yes       [id number] 10mg      Take 1          

 [facility]



     10 mg      1-20                               capsule by           [facility] of



     capsule      00:00:                               mouth 4           [place]



               00                                 (four)           [facility]



                                                  times           [facility]



                                                  daily as           



                                                  needed for           



                                                  Abdominal           



                                                  pain.           

 

     Saccharomyc      2022-0      Yes       [id number] 250mg      Take 1         

  [facility]



     es        1-20                               capsule by           ity of



     boulardii      00:00:                               mouth 2           [place]



     (FLORASTOR)      00                                 (two)           Medical



     250 mg                                         times           [facility]



     capsule                                         daily.           

 

     ondansetron      2022-0      Yes       [id number] 4mg       Take 1          

 [facility]



     (ZOFRAN      1-20                               tablet by           [facility] of



     ODT) 4 mg      00:00:                               mouth           [place]



     disintegrat      00                                 every 8           Med[facility]

al



     ing tablet                                         (eight)           [facility]



                                                  hours as           



                                                  needed for           



                                                  Nausea and           



                                                  Vomiting           



                                                  (N/V).           

 

     dicyclomine      2022-0      Yes       [id number] 10mg      Take 1          

 [facility]



     10 mg      1-20                               capsule by           [facility] of



     capsule      00:00:                               mouth 4           [place]



               00                                 (four)           [facility]



                                                  times           [facility]



                                                  daily as           



                                                  needed for           



                                                  Abdominal           



                                                  pain.           

 

     Saccharomyc      2022-0      Yes       [id number] 250mg      Take 1         

  [facility]



     es        1-20                               capsule by           [facility] of



     boulardii      00:00:                               mouth 2           [place]



     (FLORASTOR)      00                                 (two)           [facility]



     250 mg                                         times           [facility]



     capsule                                         daily.           

 

     ondansetron      2022-0      Yes       [id number] 4mg       Take 1          

 [facility]



     (ZOFRAN      1-20                               tablet by           [facility] of



     ODT) 4 mg      00:00:                               mouth           [place]



     disintegrat      00                                 every 8           Med[facility]

al



     ing tablet                                         (eight)           [facility]



                                                  hours as           



                                                  needed for           



                                                  Nausea and           



                                                  Vomiting           



                                                  (N/V).           

 

     dicyclomine      2022-0      Yes       [id number] 10mg      Take 1          

 [facility]



     10 mg      1-20                               capsule by           [facility] of



     capsule      00:00:                               mouth 4           [place]



               00                                 (four)           [facility]



                                                  times           [facility]



                                                  daily as           



                                                  needed for           



                                                  Abdominal           



                                                  pain.           

 

     Saccharomyc      2022-0      Yes       [id number] 250mg      Take 1         

  [facility]



     es        1-20                               capsule by           [facility] of



     boulardii      00:00:                               mouth 2           [place]



     (FLORASTOR)      00                                 (two)           Medical



     250 mg                                         times           [facility]



     capsule                                         daily.           

 

     ondansetron      2022-0      Yes       [id number] 4mg       Take 1          

 [facility]



     (ZOFRAN      1-20                               tablet by           [facility] of



     ODT) 4 mg      00:00:                               mouth           [place]



     disintegrat      00                                 every 8           Med[facility]

al



     ing tablet                                         (eight)           [facility]



                                                  hours as           



                                                  needed for           



                                                  Nausea and           



                                                  Vomiting           



                                                  (N/V).           

 

     dicyclomine            Yes       [id number] 10mg      Take 1          

 [facility]



     10 mg      1-20                               capsule by           [facility] of



     capsule      00:00:                               mouth 4           [place]



               00                                 (four)           [facility]



                                                  times           [facility]



                                                  daily as           



                                                  needed for           



                                                  Abdominal           



                                                  pain.           

 

     Saccharomyc      2022- No        [id number] 250mg      Take 1        

   [facility]



     es        1-20 12-12                          capsule by           [facility] of



     boulardii      00:00: 00:00                          mouth 2           [place]

[facility]



     (FLORASTOR)      00   :00                           (two)           [facility]



     250 mg                                         times           [facility]



     capsule                                         daily.           

 

     ondansetron      2022- No        [id number] 4mg       Take 1         

  [facility]



     (ZOFRAN      1-20 12-12                          tablet by           [facility] of



     ODT) 4 mg      00:00: 00:00                          mouth           [place]



     disintegrat      00   :00                           every 8           Med[facility]

al



     ing tablet                                         (eight)           [facility]



                                                  hours as           



                                                  needed for           



                                                  Nausea and           



                                                  Vomiting           



                                                  (N/V).           

 

     dicyclomine      2022- No        [id number] 10mg      Take 1         

  [facility]



     10 mg      1-20 12-12                          capsule by           ity of



     capsule      00:00: 00:00                          mouth 4           [place]



               00   :00                           (four)           [facility]



                                                  times           [facility]



                                                  daily as           



                                                  needed for           



                                                  Abdominal           



                                                  pain.           

 

     Saccharomyc      2022- No        [id number] 250mg      Take 1        

   [facility]



     es        1-20 12-12                          capsule by           [facility]



     boulardii      00:00: 00:00                          mouth 2           [place]

[facility]



     (FLORASTOR)      00   :00                           (two)           [facility]



     250 mg                                         times           [facility]



     capsule                                         daily.           

 

     ondansetron      2022- No        [id number] 4mg       Take 1         

  [facility]



     (ZOFRAN      1-20 12-12                          tablet by           [facility]



     ODT) 4 mg      00:00: 00:00                          mouth           [place]



     disintegrat      00   :00                           every 8           Med[facility]

al



     ing tablet                                         (eight)           [facility]



                                                  hours as           



                                                  needed for           



                                                  Nausea and           



                                                  Vomiting           



                                                  (N/V).           

 

     dicyclomine      2022- No        [id number] 10mg      Take 1         

  [facility]



     10 mg      1-20 12-12                          capsule by           ity of



     capsule      00:00: 00:00                          mouth 4           [place]



               00   :00                           (four)           [facility]



                                                  times           [facility]



                                                  daily as           



                                                  needed for           



                                                  Abdominal           



                                                  pain.           

 

     mirtazapine      0      Yes            15mg      15 mg at           [facility]

[facility]



     15 mg      1-17                               bedtime as           ity of



     tablet      00:00:                               needed.           [place]



                                                               [facility]



                                                                 [facility]

 

     mirtazapine      2022-0      Yes            15mg      15 mg at           [facility]

[facility]



     15 mg      1-17                               bedtime as           ity of



     tablet      00:00:                               needed.           [place]



                                                               [facility]



                                                                 [facility]

 

     mirtazapine      2022-0      Yes            15mg      15 mg at           [facility]

[facility]



     15 mg      1-17                               bedtime as           ity of



     tablet      00:00:                               needed.           [place]



                                                               [facility]



                                                                 [facility]

 

     mirtazapine      2022-0      Yes            15mg      15 mg at           [facility]

[facility]



     15 mg      1-17                               bedtime as           ity of



     tablet      00:00:                               needed.           [place]



                                                               [facility]



                                                                 [facility]

 

     mirtazapine      2022-0      Yes            15mg      15 mg at           [facility]

[facility]



     15 mg      1-17                               bedtime as           ity of



     tablet      00:00:                               needed.           [place]



                                                               [facility]



                                                                 [facility]

 

     mirtazapine      2022-0      Yes            15mg      15 mg at           [facility]

[facility]



     15 mg      1-17                               bedtime as           ity of



     tablet      00:00:                               needed.           [place]



                                                               [facility]



                                                                 [facility]

 

     mirtazapine      2022-0      Yes            15mg      15 mg at           [facility]

[facility]



     15 mg      1-17                               bedtime as           ity of



     tablet      00:00:                               needed.           [place]



                                                               [facility]



                                                                 [facility]

 

     mirtazapine      2022-0      Yes            15mg      15 mg at           [facility]

[facility]



     15 mg      1-17                               bedtime as           ity of



     tablet      00:00:                               needed.           [place]



                                                               [facility]



                                                                 [facility]

 

     mirtazapine      2022-0      Yes            15mg      15 mg at           Un

[facility]



     15 mg      1-17                               bedtime as           ity of



     tablet      00:00:                               needed.           [place]



                                                               [facility]



                                                                 [facility]

 

     mirtazapine      2022-0      Yes            15mg      15 mg at           Un

[facility]



     15 mg      1-17                               bedtime as           ity of



     tablet      00:00:                               needed.           [place]



                                                               [facility]



                                                                 [facility]

 

     mirtazapine      2022-0      Yes            15mg      15 mg at           [facility]

[facility]



     15 mg      1-17                               bedtime as           ity of



     tablet      00:00:                               needed.           [place]



                                                               [facility]



                                                                 [facility]

 

     mirtazapine      2022-0      Yes            15mg      15 mg at           [facility]

[facility]



     15 mg      1-17                               bedtime as           ity of



     tablet      00:00:                               needed.           [place]



                                                               [facility]



                                                                 [facility]

 

     mirtazapine      2022-0      Yes            15mg      15 mg at           [facility]

[facility]



     15 mg      1-17                               bedtime as           ity of



     tablet      00:00:                               needed.           [place]



                                                               [facility]



                                                                 [facility]

 

     mirtazapine      2022-0      Yes            15mg      15 mg at           [facility]

[facility]



     15 mg      1-17                               bedtime as           ity of



     tablet      00:00:                               needed.           [place]



               00                                                [facility]



                                                                 [facility]

 

     mirtazapine      2022-0      Yes            15mg      15 mg at           Un

[facility]



     15 mg      1-17                               bedtime as           ity of



     tablet      00:00:                               needed.           [place]



               00                                                [facility]



                                                                 [facility]

 

     mirtazapine      2022-0      Yes            15mg      15 mg at           [facility]

[facility]



     15 mg      1-17                               bedtime as           ity of



     tablet      00:00:                               needed.           [place]



               00                                                [facility]



                                                                 [facility]

 

     mirtazapine      2022-0      Yes            15mg      15 mg at           [facility]

[facility]



     15 mg      1-17                               bedtime as           ity of



     tablet      00:00:                               needed.           [place]



                                                               [facility]



                                                                 [facility]

 

     mirtazapine      2022-0      Yes            15mg      15 mg at           [facility]

[facility]



     15 mg      1-17                               bedtime as           ity of



     tablet      00:00:                               needed.           [place]



                                                               [facility]



                                                                 [facility]

 

     mirtazapine      2022-0      Yes            15mg      15 mg at           [facility]

[facility]



     15 mg      1-17                               bedtime as           ity of



     tablet      00:00:                               needed.           [place]



                                                               [facility]



                                                                 [facility]

 

     mirtazapine      2022-0      Yes            15mg      15 mg at           [facility]

[facility]



     15 mg      1-17                               bedtime as           ity of



     tablet      00:00:                               needed.           [place]



                                                               [facility]



                                                                 [facility]

 

     mirtazapine      2022-0      Yes            15mg      15 mg at           [facility]

[facility]



     15 mg      1-17                               bedtime as           ity of



     tablet      00:00:                               needed.           [place]



                                                               [facility]



                                                                 [facility]

 

     mirtazapine      2022-0      Yes            15mg      15 mg at           [facility]

[facility]



     15 mg      1-17                               bedtime as           ity of



     tablet      00:00:                               needed.           [place]



                                                               [facility]



                                                                 [facility]

 

     mirtazapine      2022-0      Yes            15mg      15 mg at           [facility]

[facility]



     15 mg      1-17                               bedtime as           ity of



     tablet      00:00:                               needed.           [place]



                                                               [facility]



                                                                 [facility]

 

     mirtazapine      2022-0      Yes            15mg      15 mg at           [facility]

[facility]



     15 mg      1-17                               bedtime as           ity of



     tablet      00:00:                               needed.           [place]



                                                               [facility]



                                                                 [facility]

 

     mirtazapine      2022-0      Yes            15mg      15 mg at           [facility]

[facility]



     15 mg      1-17                               bedtime as           ity of



     tablet      00:00:                               needed.           [place]



                                                               [facility]



                                                                 [facility]

 

     mirtazapine      2022-0      Yes            15mg      15 mg at           [facility]

[facility]



     15 mg      1-17                               bedtime as           ity of



     tablet      00:00:                               needed.           [place]



                                                               [facility]



                                                                 [facility]

 

     mirtazapine      2022-0      Yes            15mg      15 mg at           [facility]

[facility]



     15 mg      1-17                               bedtime as           ity of



     tablet      00:00:                               needed.           [place]



                                                               [facility]



                                                                 [facility]

 

     mirtazapine      2022-0      Yes            15mg      15 mg at           [facility]

[facility]



     15 mg      1-17                               bedtime as           ity of



     tablet      00:00:                               needed.           [place]



                                                               [facility]



                                                                 [facility]

 

     mirtazapine      2022-0      Yes            15mg      15 mg at           Un

[facility]



     15 mg      1-17                               bedtime as           ity of



     tablet      00:00:                               needed.           [place]



                                                               [facility]



                                                                 [facility]

 

     mirtazapine      2022-0      Yes            15mg      15 mg at           [facility]

[facility]



     15 mg      1-17                               bedtime as           ity of



     tablet      00:00:                               needed.           [place]



                                                               [facility]



                                                                 [facility]

 

     mirtazapine      2022-0      Yes            15mg      15 mg at           Un

[facility]



     15 mg      1-17                               bedtime as           ity of



     tablet      00:00:                               needed.           [place]



                                                               [facility]



                                                                 [facility]

 

     mirtazapine      2022-0      Yes            15mg      15 mg at           [facility]

[facility]



     15 mg      1-17                               bedtime as           ity of



     tablet      00:00:                               needed.           [place]



                                                               [facility]



                                                                 [facility]

 

     mirtazapine      2022-0      Yes                                     [facility]

s



     15 mg      1-17                                              ity of



     tablet      00:00:                                              [place]



               00                                                [facility]



                                                                 [facility]

 

     mirtazapine      2022-0      Yes                                     [facility]

s



     15 mg      1-17                                              ity of



     tablet      00:00:                                              [place]



                                                               [facility]



                                                                 [facility]

 

     mirtazapine      2022-0      Yes                                     [facility]

s



     15 mg      1-17                                              ity of



     tablet      00:00:                                              [place]



               00                                                [facility]



                                                                 [facility]

 

     mirtazapine      2022-0      Yes                                     [facility]

s



     15 mg      1-17                                              ity of



     tablet      00:00:                                              [place]



                                                               [facility]



                                                                 [facility]

 

     mirtazapine      2022-0      Yes                                     [facility]

s



     15 mg      1-17                                              ity of



     tablet      00:00:                                              [place]



               00                                                [facility]



                                                                 [facility]

 

     mirtazapine      2022-0      Yes                                     [facility]

s



     15 mg      1-17                                              ity of



     tablet      00:00:                                              [place]



               00                                                [facility]



                                                                 [facility]

 

     mirtazapine      2022-0      Yes                                     [facility]

s



     15 mg      1-17                                              ity of



     tablet      00:00:                                              [place]



               00                                                [facility]



                                                                 [facility]

 

     mirtazapine      2022-0      Yes                                     [facility]

s



     15 mg      1-17                                              ity of



     tablet      00:00:                                              [place]



               00                                                [facility]



                                                                 [facility]

 

     mirtazapine      2022-0      Yes                                     [facility]

s



     15 mg      1-17                                              ity of



     tablet      00:00:                                              [place]



               00                                                [facility]



                                                                 [facility]

 

     mirtazapine      2022-0      Yes                                     [facility]

s



     15 mg      1-17                                              ity of



     tablet      00:00:                                              [place]



                                                               [facility]



                                                                 [facility]

 

     mirtazapine      2022-0      Yes                                     [facility]

s



     15 mg      1-17                                              ity of



     tablet      00:00:                                              [place]



                                                               [facility]



                                                                 [facility]

 

     mirtazapine      2-0      Yes                                     [facility]

s



     15 mg      1-17                                              ity of



     tablet      00:00:                                              [place]



               00                                                [facility]



                                                                 [facility]

 

     mirtazapine      2-0      Yes                                     [facility]

s



     15 mg      1-17                                              ity of



     tablet      00:00:                                              [place]



                                                               [facility]



                                                                 [facility]

 

     mirtazapine      -0      Yes                                     [facility]

s



     15 mg      1-17                                              ity of



     tablet      00:00:                                              [place]



               00                                                [facility]



                                                                 [facility]

 

     mirtazapine      -0      Yes                                     [facility]

s



     15 mg      1-17                                              ity of



     tablet      00:00:                                              [place]



               00                                                [facility]



                                                                 [facility]

 

     mirtazapine      -0      Yes                                     [facility]

s



     15 mg      1-17                                              ity of



     tablet      00:00:                                              [place]



               00                                                [facility]



                                                                 [facility]

 

     mirtazapine      -0      Yes                                     [facility]

s



     15 mg      1-17                                              ity of



     tablet      00:00:                                              [place]



               00                                                [facility]



                                                                 [facility]

 

     mirtazapine      -0      Yes                                     [facility]

s



     15 mg      1-17                                              ity of



     tablet      00:00:                                              [place]



               00                                                [facility]



                                                                 [facility]

 

     mirtazapine      -0      Yes                                     [facility]

s



     15 mg      1-17                                              ity of



     tablet      00:00:                                              [place]



               00                                                [facility]



                                                                 [facility]

 

     mirtazapine      -0      Yes                                     [facility]

s



     15 mg      1-17                                              ity of



     tablet      00:00:                                              [place]



               00                                                [facility]



                                                                 [facility]

 

     mirtazapine      -0      Yes                                     [facility]

s



     15 mg      1-17                                              ity of



     tablet      00:00:                                              [place]



               00                                                [facility]



                                                                 [facility]

 

     mirtazapine      2-0      Yes                                     [facility]

s



     15 mg      1-17                                              ity of



     tablet      00:00:                                              [place]



               00                                                [facility]



                                                                 [facility]

 

     mirtazapine      2-0      Yes                                     [facility]

s



     15 mg      1-17                                              ity of



     tablet      00:00:                                              [place]



               00                                                [facility]



                                                                 [facility]

 

     mirtazapine      2-0      Yes                                     [facility]

s



     15 mg      1-17                                              ity of



     tablet      00:00:                                              [place]



               00                                                [facility]



                                                                 [facility]

 

     mirtazapine      -0      Yes                                     [facility]

s



     15 mg      1-17                                              ity of



     tablet      00:00:                                              [place]



               00                                                [facility]



                                                                 [facility]

 

     mirtazapine      2-0      Yes            15mg      15 mg at           Un

[facility]



     15 mg      1-17                               bedtime as           ity of



     tablet      00:00:                               needed.           [place]



               00                                                [facility]



                                                                 [facility]

 

     mirtazapine      2022-0      Yes            15mg      15 mg at           Un

[facility]



     15 mg      1-17                               bedtime as           ity of



     tablet      00:00:                               needed.           [address]



                                                                 Branch

 

     mirtazapine      2022-0      Yes            15mg      15 mg at           Un

[facility]



     15 mg      1-17                               bedtime as           ity of



     tablet      00:00:                               needed.           [address]

 

     mirtazapine      2022-0      Yes            15mg      15 mg at           Un

[facility]



     15 mg      1-17                               bedtime as           ity of



     tablet      00:00:                               needed.           [address]

 

     Dose      2022-0      No                                      



     Unknown      1-17                                              



               00:00:                                              



               00                                                

 

     Lexapro 20      2022-0      No             1mg                      



     mg tablet      1-17                                              



               00:00:                                              



               00                                                

 

     mirtazapine      2022-0      No             1mg                      



     15 mg      1-17                                              



     tablet      00:00:                                              



               00                                                

 

     ferrous      2-0      No             1(65 mg                     



     sulfate 325      1-17                     iron)                     



     mg (65 mg      00:00:                                              



     iron)      00                                                



     tablet                                                        

 

     Dose      2-0      No                                      



     Unknown      1-17                                              



               00:00:                                              



               00                                                

 

     Lexapro 20      2022-0      No             1mg                      



     mg tablet      1-17                                              



               00:00:                                              



               00                                                

 

     mirtazapine      2022-0      No             1mg                      



     15 mg      1-17                                              



     tablet      00:00:                                              



               00                                                

 

     Dose      2022-0      No                                      



     Unknown      1-17                                              



               00:00:                                              



               00                                                

 

     Dose      2022-0      No                                      



     Unknown      1-17                                              



               00:00:                                              



               00                                                

 

     Lexapro 20      2022-0      No             1mg                      



     mg tablet      1-17                                              



               00:00:                                              



               00                                                

 

     mirtazapine      2022-0      No             1mg                      



     15 mg      1-17                                              



     tablet      00:00:                                              



               00                                                

 

     ferrous      2022-0      No             1(65 mg                     



     sulfate 325      1-17                     iron)                     



     mg (65 mg      00:00:                                              



     iron)      00                                                



     tablet                                                        

 

     Dose      2-0      No                                      



     Unknown      1-17                                              



               00:00:                                              



               00                                                

 

     Lexapro 20      2022-0      No             1mg                      



     mg tablet      1-17                                              



               00:00:                                              



               00                                                

 

     mirtazapine      2022-0      No             1mg                      



     15 mg      1-17                                              



     tablet      00:00:                                              



               00                                                

 

     Dose      2022-0      No                                      



     Unknown      1-17                                              



               00:00:                                              



               00                                                

 

     ferrous      2-0      No             1(65 mg                     



     sulfate 325      1-16                     iron)                     



     mg (65 mg      00:00:                                              



     iron)      00                                                



     tablet                                                        

 

     ferrous      2022-0      No             1(65 mg                     



     sulfate 325      1-16                     iron)                     



     mg (65 mg      00:00:                                              



     iron)      00                                                



     tablet                                                        

 

     ferrous      2-0      No             1(65 mg                     



     sulfate 325      1-16                     iron)                     



     mg (65 mg      00:00:                                              



     iron)      00                                                



     tablet                                                        

 

     ferrous      -0      No             1(65 mg                     



     sulfate 325      1-16                     iron)                     



     mg (65 mg      00:00:                                              



     iron)      00                                                



     tablet                                                        

 

     cyclobenzap      2021      Yes                                     [facility]

s



     rine 5 mg      2-09                                              ity of



     tablet      00:00:                                              [place]



                                                               [facility]



                                                                 [facility]

 

     cyclobenzap      2021      Yes                                     [facility]

s



     rine 5 mg      2-09                                              ity of



     tablet      00:00:                                              [place]



                                                               [facility]



                                                                 [facility]

 

     cyclobenzap      2021      Yes                                     [facility]

s



     rine 5 mg      2-09                                              ity of



     tablet      00:00:                                              [place]



                                                               [facility]



                                                                 [facility]

 

     cyclobenzap      2021      Yes                                     [facility]

s



     rine 5 mg      2-09                                              ity of



     tablet      00:00:                                              [place]



                                                               [facility]



                                                                 [facility]

 

     cyclobenzap      2021      Yes                                     [facility]

s



     rine 5 mg      2-09                                              ity of



     tablet      00:00:                                              [place]



                                                               [facility]



                                                                 [facility]

 

     cyclobenzap      2021      Yes                                     [facility]

s



     rine 5 mg      2-09                                              ity of



     tablet      00:00:                                              [place]



                                                               [facility]



                                                                 [facility]

 

     cyclobenzap      2021      Yes                                     [facility]

s



     rine 5 mg      2-09                                              ity of



     tablet      00:00:                                              [place]



                                                               [facility]



                                                                 [facility]

 

     cyclobenzap      2021      Yes                                     [facility]

s



     rine 5 mg      2-09                                              ity of



     tablet      00:00:                                              [place]



                                                               [facility]



                                                                 [facility]

 

     cyclobenzap      2021      Yes                                     [facility]

s



     rine 5 mg      2-09                                              ity of



     tablet      00:00:                                              [place]



                                                               [facility]



                                                                 [facility]

 

     cyclobenzap      2021      Yes                                     [facility]

s



     rine 5 mg      2-09                                              ity of



     tablet      00:00:                                              [place]



                                                               [facility]



                                                                 [facility]

 

     cyclobenzap      2021      Yes                                     [facility]

s



     rine 5 mg      2-09                                              ity of



     tablet      00:00:                                              [place]



                                                               [facility]



                                                                 [facility]

 

     cyclobenzap      2021      Yes                                     [facility]

s



     rine 5 mg      2-09                                              ity of



     tablet      00:00:                                              [place]



                                                               [facility]



                                                                 [facility]

 

     cyclobenzap      2021      Yes                                     [facility]

s



     rine 5 mg      2-09                                              ity of



     tablet      00:00:                                              [place]



                                                               [facility]



                                                                 [facility]

 

     cyclobenzap      2021      Yes                                     [facility]

s



     rine 5 mg      2-09                                              ity of



     tablet      00:00:                                              [place]



                                                               [facility]



                                                                 [facility]

 

     cyclobenzap      2021      Yes                                     [facility]

s



     rine 5 mg      2-09                                              ity of



     tablet      00:00:                                              [place]



                                                               [facility]



                                                                 [facility]

 

     cyclobenzap      2021      Yes                                     [facility]

s



     rine 5 mg      2-09                                              ity of



     tablet      00:00:                                              [place]



               00                                                [facility]



                                                                 [facility]

 

     cyclobenzap      -      Yes                                     [facility]

s



     rine 5 mg      2-09                                              ity of



     tablet      00:00:                                              [address]



                                                                 Branch

 

     cyclobenzap      -      Yes                                     [facility]

s



     rine 5 mg      2-09                                              ity of



     tablet      00:00:                                              [address]



                                                                 Branch

 

     cyclobenzap      2021      Yes                                     Un[facility]

s



     rine 5 mg      2-09                                              ity of



     tablet      00:00:                                              [address]

 

     cyclobenzap      -      Yes                                     Un[facility]

s



     rine 5 mg      2-09                                              ity of



     tablet      00:00:                                              [address]

 

     cyclobenzap      2021      Yes                                     Un[facility]

s



     rine 5 mg      2-09                                              ity of



     tablet      00:00:                                              [address]

 

     cyclobenzap      -      Yes                                     Un[facility]

s



     rine 5 mg      2-09                                              ity of



     tablet      00:00:                                              [address]

 

     cyclobenzap      2021      Yes                                     Un[facility]

s



     rine 5 mg      2-09                                              ity of



     tablet      00:00:                                              [address]

 

     cyclobenzap      -      Yes                                     Un[facility]

s



     rine 5 mg      2-09                                              ity of



     tablet      00:00:                                              [address]

 

     cyclobenzap      2021      Yes                                     Un[facility]

s



     rine 5 mg      2-09                                              ity of



     tablet      00:00:                                              [address]

 

     dicyclomine      -      No             1mg                      



     20 mg      2-09                                              



     tablet      00:00:                                              



               00                                                

 

     dicyclomine      -1      No             1mg                      



     20 mg      2-09                                              



     tablet      00:00:                                              



               00                                                

 

     cyclobenzap      -      No             12mg                     



     rine 5 mg      2-09                                              



     tablet      00:00:                                              



               00                                                

 

     dicyclomine      -1      No             1mg                      



     20 mg      2-09                                              



     tablet      00:00:                                              



               00                                                

 

     Dose      -1      No                                      



     Unknown      2-09                                              



               00:00:                                              



               00                                                

 

     Dose      -1      No                                      



     Unknown      2-09                                              



               00:00:                                              



               00                                                

 

     dicyclomine      -1      No             1mg                      



     20 mg      2-09                                              



     tablet      00:00:                                              



               00                                                

 

     dicyclomine      -1      No             1mg                      



     20 mg      2-09                                              



     tablet      00:00:                                              



               00                                                

 

     cyclobenzap      -1      No             12mg                     



     rine 5 mg      2-09                                              



     tablet      00:00:                                              



               00                                                

 

     dicyclomine      -1      No             1mg                      



     20 mg      2-09                                              



     tablet      00:00:                                              



               00                                                

 

     Dose      2021-1      No                                      



     Unknown      2-09                                              



               00:00:                                              



               00                                                

 

     Dose      1-1      No                                      



     Unknown      2-09                                              



               00:00:                                              



               00                                                

 

     cyclobenzap      -1 2- No                                      [facility]

rs



     rine 5 mg      2--                                         ity of



     tablet      00:00: 00:00                                         [place]



               00   :00                                          [facility]



                                                                 [facility]

 

     cyclobenzap      -1 2022- No                                      [facility]

rs



     rine 5 mg      --                                         ity of



     tablet      00:00: 00:00                                         [place]



               00   :00                                          [facility]



                                                                 [facility]

 

     lisinopril      1-1      No             1mg                      



     10 mg      1-30                                              



     tablet      00:00:                                              



               00                                                

 

     lisinopril      1-1      No             1mg                      



     10 mg      1-30                                              



     tablet      00:00:                                              



               00                                                

 

     lisinopril      1-1      No             1mg                      



     10 mg      1-30                                              



     tablet      00:00:                                              



               00                                                

 

     lisinopril      1-1      No             1mg                      



     10 mg      1-30                                              



     tablet      00:00:                                              



               00                                                

 

     Bromfed DM      1-1      No             75mg/5                     



     2 mg-30      0-19                     mL                       



     mg-10 mg/5      00:00:                                              



     mL oral      00                                                



     syrup                                                        

 

     Dose      -1      No                                      



     Unknown      0-19                                              



               00:00:                                              



               00                                                

 

     Bromfed DM      1-1      No             75mg/5                     



     2 mg-30      0-19                     mL                       



     mg-10 mg/5      00:00:                                              



     mL oral      00                                                



     syrup                                                        

 

     Dose      -1      No                                      



     Unknown      0-19                                              



               00:00:                                              



               00                                                

 

     ibuprofen      2021-0      No             1mg                      



     600 mg      9-23                                              



     tablet      00:00:                                              



               00                                                

 

     lisinopril      2021-0      No             1mg                      



     10 mg      9-23                                              



     tablet      00:00:                                              



               00                                                

 

     cyclobenzap      1-0      No             12mg                     



     rine 5 mg      9-23                                              



     tablet      00:00:                                              



               00                                                

 

     Dose      2021-0      No                                      



     Unknown      9-23                                              



               00:00:                                              



               00                                                

 

     lisinopriL      2021-0      Yes            10mg      Take 10 mg           U

[facility]



     10 mg      9-23                               by mouth           ity of



     tablet      00:00:                               daily.           [address]



                                                                 Branch

 

     lisinopriL      1-0      Yes            10mg      Take 10 mg           U

nivers



     10 mg      9-23                               by mouth           ity of



     tablet      00:00:                               daily.           [address]

 

     lisinopriL      1-0      Yes            10mg      Take 10 mg           U

nivers



     10 mg      9-23                               by mouth           ity of



     tablet      00:00:                               daily.           [address]

 

     lisinopriL      1-0      Yes            10mg      Take 10 mg           U

nivers



     10 mg      9-23                               by mouth           ity of



     tablet      00:00:                               daily.           [place]



               00                                                [facility]



                                                                 [facility]

 

     lisinopriL      -0      Yes            10mg      Take 10 mg           [facility]

nivers



     10 mg      9-23                               by mouth           ity of



     tablet      00:00:                               daily.           [place]



               00                                                [facility]



                                                                 [facility]

 

     lisinopriL      -0      Yes            10mg      Take 10 mg           [facility]

nivers



     10 mg      9-23                               by mouth           ity of



     tablet      00:00:                               daily.           [place]



               00                                                [facility]



                                                                 [facility]

 

     lisinopriL      -0      Yes            10mg      Take 10 mg           [facility]

nivers



     10 mg      9-23                               by mouth           ity of



     tablet      00:00:                               daily.           [place]



               00                                                [facility]



                                                                 [facility]

 

     lisinopriL      -0      Yes            10mg      Take 10 mg           U

nivers



     10 mg      9-23                               by mouth           ity of



     tablet      00:00:                               daily.           [place]



               00                                                [facility]



                                                                 [facility]

 

     lisinopriL      -0      Yes            10mg      Take 10 mg           U

nivers



     10 mg      9-23                               by mouth           ity of



     tablet      00:00:                               daily.           [place]



               00                                                [facility]



                                                                 [facility]

 

     lisinopriL      -0      Yes            10mg      Take 10 mg           U

nivers



     10 mg      9-23                               by mouth           ity of



     tablet      00:00:                               daily.           [place]



               00                                                [facility]



                                                                 [facility]

 

     lisinopriL      -0      Yes            10mg      Take 10 mg           [facility]

nivers



     10 mg      9-23                               by mouth           ity of



     tablet      00:00:                               daily.           [place]



               00                                                [facility]



                                                                 [facility]

 

     lisinopriL      -0      Yes            10mg      Take 10 mg           [facility]

nivers



     10 mg      9-23                               by mouth           ity of



     tablet      00:00:                               daily.           [place]



               00                                                [facility]



                                                                 [facility]

 

     lisinopriL      -0      Yes            10mg      Take 10 mg           [facility]

nivers



     10 mg      9-23                               by mouth           ity of



     tablet      00:00:                               daily.           [place]



               00                                                [facility]



                                                                 [facility]

 

     lisinopriL      -0      Yes            10mg      Take 10 mg           [facility]

nivers



     10 mg      9-23                               by mouth           ity of



     tablet      00:00:                               daily.           [place]



               00                                                [facility]



                                                                 [facility]

 

     lisinopriL      -0      Yes            10mg      Take 10 mg           [facility]

nivers



     10 mg      9-23                               by mouth           ity of



     tablet      00:00:                               daily.           [place]



               00                                                [facility]



                                                                 [facility]

 

     lisinopriL      1-0      Yes            10mg      Take 10 mg           U

nivers



     10 mg      9-23                               by mouth           ity of



     tablet      00:00:                               daily.           [place]



               00                                                [facility]



                                                                 [facility]

 

     lisinopriL      -0      Yes            10mg      Take 10 mg           [facility]

nivers



     10 mg      9-23                               by mouth           ity of



     tablet      00:00:                               daily.           [place]



               00                                                [facility]



                                                                 [facility]

 

     lisinopriL      -0      Yes            10mg      Take 10 mg           U

nivers



     10 mg      9-23                               by mouth           ity of



     tablet      00:00:                               daily.           [place]



               00                                                [facility]



                                                                 [facility]

 

     lisinopriL      -0      Yes            10mg      Take 10 mg           [facility]

nivers



     10 mg      9-23                               by mouth           ity of



     tablet      00:00:                               daily.           [place]



               00                                                [facility]



                                                                 [facility]

 

     lisinopriL      -0      Yes            10mg      Take 10 mg           [facility]

nivers



     10 mg      9-23                               by mouth           ity of



     tablet      00:00:                               daily.           [place]



               00                                                [facility]



                                                                 [facility]

 

     lisinopriL      -0      Yes            10mg      Take 10 mg           [facility]

nivers



     10 mg      9-23                               by mouth           ity of



     tablet      00:00:                               daily.           [place]



               00                                                [facility]



                                                                 [facility]

 

     lisinopriL      -0      Yes            10mg      Take 10 mg           [facility]

nivers



     10 mg      9-23                               by mouth           ity of



     tablet      00:00:                               daily.           [place]



               00                                                [facility]



                                                                 [facility]

 

     lisinopriL      -0      Yes            10mg      Take 10 mg           [facility]

nivers



     10 mg      9-23                               by mouth           ity of



     tablet      00:00:                               daily.           [place]



               00                                                [facility]



                                                                 [facility]

 

     lisinopriL      -0      Yes            10mg      Take 10 mg           U

nivers



     10 mg      9-23                               by mouth           ity of



     tablet      00:00:                               daily.           [place]



               00                                                [facility]



                                                                 [facility]

 

     lisinopriL      -0      Yes            10mg      Take 10 mg           [facility]

nivers



     10 mg      9-23                               by mouth           ity of



     tablet      00:00:                               daily.           [place]



               00                                                [facility]



                                                                 [facility]

 

     lisinopriL      -0      Yes            10mg      Take 10 mg           [facility]

nivers



     10 mg      9-23                               by mouth           ity of



     tablet      00:00:                               daily.           [place]



               00                                                [facility]



                                                                 [facility]

 

     lisinopriL      -0      Yes            10mg      Take 10 mg           [facility]

nivers



     10 mg      9-23                               by mouth           ity of



     tablet      00:00:                               daily.           [place]



               00                                                [facility]



                                                                 [facility]

 

     lisinopriL      -0      Yes            10mg      Take 10 mg           [facility]

nivers



     10 mg      9-23                               by mouth           ity of



     tablet      00:00:                               daily.           [place]



               00                                                [facility]



                                                                 [facility]

 

     lisinopriL      -0      Yes            10mg      Take 10 mg           [facility]

nivers



     10 mg      9-23                               by mouth           ity of



     tablet      00:00:                               daily.           [place]



               00                                                [facility]



                                                                 [facility]

 

     lisinopriL      -0      Yes            10mg      Take 10 mg           [facility]

nivers



     10 mg      9-23                               by mouth           ity of



     tablet      00:00:                               daily.           [place]



               00                                                [facility]



                                                                 [facility]

 

     lisinopriL      1-0      Yes            10mg      Take 10 mg           [facility]

nivers



     10 mg      9-23                               by mouth           ity of



     tablet      00:00:                               daily.           [place]



               00                                                [facility]



                                                                 [facility]

 

     lisinopriL      1-0      Yes            10mg      Take 10 mg           U

nivers



     10 mg      9-23                               by mouth           ity of



     tablet      00:00:                               daily.           [place]



               00                                                [facility]



                                                                 [facility]

 

     lisinopriL      -0      Yes            10mg      Take 10 mg           [facility]

nivers



     10 mg      9-23                               by mouth           ity of



     tablet      00:00:                               daily.           [place]



               00                                                [facility]



                                                                 [facility]

 

     lisinopriL      -0      Yes            10mg      Take 10 mg           [facility]

nivers



     10 mg      9-23                               by mouth           ity of



     tablet      00:00:                               daily.           [place]



               00                                                [facility]



                                                                 [facility]

 

     lisinopriL      -0      Yes            10mg      Take 10 mg           [facility]

nivers



     10 mg      9-23                               by mouth           ity of



     tablet      00:00:                               daily.           [place]



               00                                                [facility]



                                                                 [facility]

 

     lisinopriL      -0      Yes            10mg      Take 10 mg           [facility]

nivers



     10 mg      9-23                               by mouth           ity of



     tablet      00:00:                               daily.           [place]



               00                                                [facility]



                                                                 [facility]

 

     lisinopriL      -0      Yes            10mg      Take 10 mg           U

nivers



     10 mg      9-23                               by mouth           ity of



     tablet      00:00:                               daily.           [place]



               00                                                [facility]



                                                                 [facility]

 

     lisinopriL      -0      Yes            10mg      Take 10 mg           [facility]

nivers



     10 mg      9-23                               by mouth           ity of



     tablet      00:00:                               daily.           [place]



               00                                                [facility]



                                                                 [facility]

 

     lisinopriL      -0      Yes            10mg      Take 10 mg           U

nivers



     10 mg      9-23                               by mouth           ity of



     tablet      00:00:                               daily.           [place]



               00                                                [facility]



                                                                 [facility]

 

     lisinopriL      1-0      Yes            10mg      Take 10 mg           [facility]

nivers



     10 mg      9-23                               by mouth           ity of



     tablet      00:00:                               daily.           [place]



               00                                                [facility]



                                                                 [facility]

 

     lisinopriL      1-0      Yes            10mg      Take 10 mg           U

nivers



     10 mg      9-23                               by mouth           ity of



     tablet      00:00:                               daily.           [place]



               00                                                [facility]



                                                                 [facility]

 

     lisinopriL      -0      Yes            10mg      Take 10 mg           [facility]

nivers



     10 mg      9-23                               by mouth           ity of



     tablet      00:00:                               daily.           [place]



                                                               [facility]



                                                                 [facility]

 

     lisinopriL      -0      Yes            10mg      Take 10 mg           U

nivers



     10 mg      9-23                               by mouth           ity of



     tablet      00:00:                               daily.           [place]



               00                                                [facility]



                                                                 [facility]

 

     lisinopriL      1-0      Yes            10mg      Take 10 mg           U

nivers



     10 mg      9-23                               by mouth           ity of



     tablet      00:00:                               daily.           [place]



               00                                                [facility]



                                                                 [facility]

 

     lisinopriL      1-0      Yes            10mg      Take 10 mg           U

nivers



     10 mg      9-23                               by mouth           ity of



     tablet      00:00:                               daily.           [place]



               00                                                [facility]



                                                                 [facility]

 

     lisinopriL      1-0      Yes            10mg      Take 10 mg           U

nivers



     10 mg      9-23                               by mouth           ity of



     tablet      00:00:                               daily.           [place]



               00                                                [facility]



                                                                 [facility]

 

     lisinopriL      1-0      Yes            10mg      Take 10 mg           [facility]

nivers



     10 mg      9-23                               by mouth           ity of



     tablet      00:00:                               daily.           [place]



                                                               [facility]



                                                                 [facility]

 

     lisinopriL      1-0      Yes            10mg      Take 10 mg           [facility]

nivers



     10 mg      9-23                               by mouth           ity of



     tablet      00:00:                               daily.           [place]



                                                               [facility]



                                                                 [facility]

 

     lisinopriL      -0      Yes            10mg      Take 10 mg           [facility]

nivers



     10 mg      9-23                               by mouth           ity of



     tablet      00:00:                               daily.           [place]



               00                                                [facility]



                                                                 [facility]

 

     lisinopriL      -0      Yes            10mg      Take 10 mg           U

nivers



     10 mg      9-23                               by mouth           ity of



     tablet      00:00:                               daily.           [place]



                                                               [facility]



                                                                 [facility]

 

     lisinopriL      1-0      Yes            10mg      Take 10 mg           U

nivers



     10 mg      9-23                               by mouth           ity of



     tablet      00:00:                               daily.           [place]



                                                               [facility]



                                                                 [facility]

 

     lisinopriL      1-0      Yes            10mg      Take 10 mg           U

nivers



     10 mg      9-23                               by mouth           ity of



     tablet      00:00:                               daily.           [place]



               00                                                [facility]



                                                                 [facility]

 

     lisinopriL      1-0      Yes            10mg      Take 10 mg           U

nivers



     10 mg      9-23                               by mouth           ity of



     tablet      00:00:                               daily.           [place]



                                                               [facility]



                                                                 [facility]

 

     lisinopriL      1-0      Yes            10mg      Take 10 mg           U

nivers



     10 mg      9-23                               by mouth           ity of



     tablet      00:00:                               daily.           [place]



                                                               [facility]



                                                                 [facility]

 

     lisinopriL      2021-0      Yes            10mg      Take 10 mg           U

nivers



     10 mg      9-23                               by mouth           ity of



     tablet      00:00:                               daily.           [place]



                                                               [facility]



                                                                 [facility]

 

     lisinopriL      1-0      Yes            10mg      Take 10 mg           U

nivers



     10 mg      9-23                               by mouth           ity of



     tablet      00:00:                               daily.           [address]

 

     lisinopriL      2021-0      Yes            10mg      Take 10 mg           U

nivers



     10 mg      9-23                               by mouth           ity of



     tablet      00:00:                               daily.           [address]

 

     lisinopriL      2021-0      Yes            10mg      Take 10 mg           U

nivers



     10 mg      9-23                               by mouth           ity of



     tablet      00:00:                               daily.           [address]

 

     lisinopriL      2021-0      Yes            10mg      Take 10 mg           U

nivers



     10 mg      9-23                               by mouth           ity of



     tablet      00:00:                               daily.           [address]

 

     lisinopriL      2021-0      Yes            10mg      Take 10 mg           U

nivers



     10 mg      9-23                               by mouth           ity of



     tablet      00:00:                               daily.           [address]

 

     lisinopriL      2021-0      Yes            10mg      Take 10 mg           U

nivers



     10 mg      9-23                               by mouth           ity of



     tablet      00:00:                               daily.           [address]

 

     lisinopril      2021-0      No             1mg                      



     10 mg      9-23                                              



     tablet      00:00:                                              



               00                                                

 

     cyclobenzap      2021-0      No             12mg                     



     rine 5 mg      9-23                                              



     tablet      00:00:                                              



               00                                                

 

     ibuprofen      2021-0      No             1mg                      



     600 mg      9-23                                              



     tablet      00:00:                                              



               00                                                

 

     lisinopril      2021-0      No             1mg                      



     10 mg      9-23                                              



     tablet      00:00:                                              



               00                                                

 

     lisinopril      2021-0      No             1mg                      



     10 mg      9-23                                              



     tablet      00:00:                                              



               00                                                

 

     cyclobenzap      2021-0      No             12mg                     



     rine 5 mg      9-23                                              



     tablet      00:00:                                              



               00                                                

 

     cyclobenzap      2021-0      No             12mg                     



     rine 5 mg      9-23                                              



     tablet      00:00:                                              



               00                                                

 

     Dose      2021-0      No                                      



     Unknown      9-23                                              



               00:00:                                              



               00                                                

 

     Lexapro 10      2021-0      No             15mg                     



     mg tablet      7-29                                              



               00:00:                                              



               00                                                

 

     mirtazapine      2021-0      No             1mg                      



     15 mg      7-29                                              



     tablet      00:00:                                              



               00                                                

 

     Strattera      2021-0      No             1mg                      



     40 mg      7-29                                              



     capsule      00:00:                                              



               00                                                

 

     Lexapro 10      2021-0      No             15mg                     



     mg tablet      7-29                                              



               00:00:                                              



               00                                                

 

     mirtazapine      2021-0      No             1mg                      



     15 mg      7-29                                              



     tablet      00:00:                                              



               00                                                

 

     Strattera      2021-0      No             1mg                      



     40 mg      7-29                                              



     capsule      00:00:                                              



               00                                                

 

     Lexapro 10      2021-0      No             15mg                     



     mg tablet                                                    



               00:00:                                              



               00                                                

 

     mirtazapine      2021-0      No             1mg                      



     15 mg      7-29                                              



     tablet      00:00:                                              



               00                                                

 

     Strattera      1-0      No             1mg                      



     40 mg      7-                                              



     capsule      00:00:                                              



               00                                                

 

     Lexapro 10      -0      No             15mg                     



     mg tablet                                                    



               00:00:                                              



               00                                                

 

     mirtazapine      2021-0      No             1mg                      



     15 mg      7-29                                              



     tablet      00:00:                                              



               00                                                

 

     Strattera      1-0      No             1mg                      



     40 mg      7-                                              



     capsule      00:00:                                              



               00                                                

 

     Lexapro 10      -0      No             15mg                     



     mg tablet                                                    



               00:00:                                              



               00                                                

 

     mirtazapine      2021-0      No             1mg                      



     15 mg      7-                                              



     tablet      00:00:                                              



               00                                                

 

     Strattera      1-0      No             1mg                      



     40 mg      7-                                              



     capsule      00:00:                                              



               00                                                

 

     Lexapro 10      1-0      No             15mg                     



     mg tablet                                                    



               00:00:                                              



               00                                                

 

     mirtazapine      2021-0      No             1mg                      



     15 mg      7-                                              



     tablet      00:00:                                              



               00                                                

 

     Strattera      1-0      No             1mg                      



     40 mg      7-                                              



     capsule      00:00:                                              



               00                                                

 

     Lexapro 10      1-0      No             15mg                     



     mg tablet                                                    



               00:00:                                              



               00                                                

 

     mirtazapine      1-0      No             1mg                      



     15 mg      7-06                                              



     tablet      00:00:                                              



               00                                                

 

     Strattera      1-0      No             1mg                      



     40 mg      7-                                              



     capsule      00:00:                                              



               00                                                

 

     Lexapro 10      1-0      No             15mg                     



     mg tablet                                                    



               00:00:                                              



               00                                                

 

     mirtazapine      1-0      No             1mg                      



     15 mg      7-06                                              



     tablet      00:00:                                              



               00                                                

 

     Strattera      1-0      No             1mg                      



     40 mg      7-                                              



     capsule      00:00:                                              



               00                                                

 

     escitalopra      -0      Yes            10mg      Take 10 mg           

[facility]



     m oxalate      706                               by mouth           ity of



     10 mg      00:00:                               at             [place]



     tablet      00                                 bedtime.           [facility]



                                                                 [facility]

 

     escitalopra      -0      Yes            10mg      Take 10 mg           

[facility]



     m oxalate      7-06                               by mouth           ity of



     10 mg      00:00:                               at             [place]



     tablet      00                                 bedtime.           [facility]



                                                                 [facility]

 

     escitalopra      -0      Yes            10mg      Take 10 mg           

[facility]



     m oxalate      7-06                               by mouth           ity of



     10 mg      00:00:                               at             [place]



     tablet      00                                 bedtime.           [facility]



                                                                 [facility]

 

     escitalopra      0      Yes            10mg      Take 10 mg           

[facility]



     m oxalate      7-06                               by mouth           ity of



     10 mg      00:00:                               at             [place]



     tablet      00                                 bedtime.           [facility]



                                                                 [facility]

 

     escitalopra      0      Yes            10mg      Take 10 mg           

[facility]



     m oxalate      7-06                               by mouth           ity of



     10 mg      00:00:                               at             [place]



     tablet      00                                 bedtime.           [facility]



                                                                 [facility]

 

     escitalopra      0      Yes            10mg      Take 10 mg           

[facility]



     m oxalate      7-06                               by mouth           ity of



     10 mg      00:00:                               at             [place]



     tablet      00                                 bedtime.           [facility]



                                                                 [facility]

 

     escitalopra      0      Yes            10mg      Take 10 mg           

[facility]



     m oxalate      7-06                               by mouth           ity of



     10 mg      00:00:                               at             [place]



     tablet      00                                 bedtime.           [facility]



                                                                 [facility]

 

     escitalopra      0      Yes            10mg      Take 10 mg           

[facility]



     m oxalate      7-06                               by mouth           ity of



     10 mg      00:00:                               at             [place]



     tablet      00                                 bedtime.           [facility]



                                                                 [facility]

 

     escitalopra      0      Yes            10mg      Take 10 mg           

[facility]



     m oxalate      7-06                               by mouth           ity of



     10 mg      00:00:                               at             [place]



     tablet      00                                 bedtime.           [facility]



                                                                 [facility]

 

     escitalopra      0      Yes            10mg      Take 10 mg           

[facility]



     m oxalate      7-06                               by mouth           ity of



     10 mg      00:00:                               at             [place]



     tablet      00                                 bedtime.           [facility]



                                                                 [facility]

 

     escitalopra      0      Yes            10mg      Take 10 mg           

[facility]



     m oxalate      7-06                               by mouth           ity of



     10 mg      00:00:                               at             [place]



     tablet      00                                 bedtime.           [facility]



                                                                 [facility]

 

     escitalopra      0      Yes            10mg      Take 10 mg           

[facility]



     m oxalate      7-06                               by mouth           ity of



     10 mg      00:00:                               at             [place]



     tablet      00                                 bedtime.           [facility]



                                                                 [facility]

 

     escitalopra      0      Yes            10mg      Take 10 mg           

[facility]



     m oxalate      7-06                               by mouth           ity of



     10 mg      00:00:                               at             [place]



     tablet      00                                 bedtime.           [facility]



                                                                 [facility]

 

     escitalopra      0      Yes            10mg      Take 10 mg           

[facility]



     m oxalate      7-06                               by mouth           ity of



     10 mg      00:00:                               at             [place]



     tablet      00                                 bedtime.           [facility]



                                                                 [facility]

 

     escitalopra      0      Yes            10mg      Take 10 mg           

[facility]



     m oxalate      7-06                               by mouth           ity of



     10 mg      00:00:                               at             [place]



     tablet      00                                 bedtime.           [facility]



                                                                 [facility]

 

     escitalopra      0      Yes            10mg      Take 10 mg           

[facility]



     m oxalate      7-06                               by mouth           ity of



     10 mg      00:00:                               at             [place]



     tablet      00                                 bedtime.           [facility]



                                                                 [facility]

 

     escitalopra      -0      Yes            10mg      Take 10 mg           

[facility]



     m oxalate      7-06                               by mouth           ity of



     10 mg      00:00:                               at             [place]



     tablet      00                                 bedtime.           [facility]



                                                                 [facility]

 

     escitalopra      -0      Yes            10mg      Take 10 mg           

[facility]



     m oxalate      7-06                               by mouth           ity of



     10 mg      00:00:                               at             [place]



     tablet      00                                 bedtime.           [facility]



                                                                 [facility]

 

     escitalopra      -0      Yes            10mg      Take 10 mg           

[facility]



     m oxalate      7-06                               by mouth           ity of



     10 mg      00:00:                               at             [place]



     tablet      00                                 bedtime.           [facility]



                                                                 [facility]

 

     escitalopra      -0      Yes            10mg      Take 10 mg           

[facility]



     m oxalate      7-06                               by mouth           ity of



     10 mg      00:00:                               at             [place]



     tablet      00                                 bedtime.           [facility]



                                                                 [facility]

 

     escitalopra      -0      Yes            10mg      Take 10 mg           

[facility]



     m oxalate      7-06                               by mouth           ity of



     10 mg      00:00:                               at             [place]



     tablet      00                                 bedtime.           [facility]



                                                                 [facility]

 

     escitalopra      -0      Yes            10mg      Take 10 mg           

[facility]



     m oxalate      7-06                               by mouth           ity of



     10 mg      00:00:                               at             [place]



     tablet      00                                 bedtime.           [facility]



                                                                 [facility]

 

     escitalopra      -0      Yes            10mg      Take 10 mg           

[facility]



     m oxalate      7-06                               by mouth           ity of



     10 mg      00:00:                               at             [place]



     tablet      00                                 bedtime.           [facility]



                                                                 [facility]

 

     escitalopra      -0      Yes            10mg      Take 10 mg           

[facility]



     m oxalate      7-06                               by mouth           ity of



     10 mg      00:00:                               at             [place]



     tablet      00                                 bedtime.           [facility]



                                                                 [facility]

 

     escitalopra      -0      Yes            10mg      Take 10 mg           

[facility]



     m oxalate      7-06                               by mouth           ity of



     10 mg      00:00:                               at             [place]



     tablet      00                                 bedtime.           [facility]



                                                                 [facility]

 

     escitalopra      -0      Yes            10mg      Take 10 mg           

[facility]



     m oxalate      7-06                               by mouth           ity of



     10 mg      00:00:                               at             [place]



     tablet      00                                 bedtime.           [facility]



                                                                 [facility]

 

     escitalopra      -0      Yes            10mg      Take 10 mg           

[facility]



     m oxalate      7-06                               by mouth           ity of



     10 mg      00:00:                               at             [place]



     tablet      00                                 bedtime.           [facility]



                                                                 [facility]

 

     escitalopra      -0      Yes            10mg      Take 10 mg           

[facility]



     m oxalate      7-06                               by mouth           ity of



     10 mg      00:00:                               at             [place]



     tablet      00                                 bedtime.           [facility]



                                                                 [facility]

 

     escitalopra      -0      Yes            10mg      Take 10 mg           

[facility]



     m oxalate      7-06                               by mouth           ity of



     10 mg      00:00:                               at             [place]



     tablet      00                                 bedtime.           [facility]



                                                                 [facility]

 

     escitalopra      -0      Yes            10mg      Take 10 mg           

[facility]



     m oxalate      7-06                               by mouth           ity of



     10 mg      00:00:                               at             [place]



     tablet      00                                 bedtime.           [facility]



                                                                 [facility]

 

     escitalopra      -0      Yes            10mg      Take 10 mg           

[facility]



     m oxalate      7-06                               by mouth           ity of



     10 mg      00:00:                               at             [place]



     tablet      00                                 bedtime.           [facility]



                                                                 [facility]

 

     escitalopra      -0      Yes            10mg      Take 10 mg           

[facility]



     m oxalate      7-06                               by mouth           ity of



     10 mg      00:00:                               at             [place]



     tablet      00                                 bedtime.           [facility]



                                                                 [facility]

 

     atomoxetine      -0      Yes            40mg      Take 40 mg           

[facility]



     40 mg      7-06                               by mouth           ity of



     capsule      00:00:                               daily.           [place]



               00                                                [facility]



                                                                 [facility]

 

     escitalopra      -0      Yes            10mg      Take 10 mg           

[facility]



     m oxalate      7-06                               by mouth 2           ity 

of



     10 mg      00:00:                               (two)           [place]



     tablet      00                                 times           [facility]



                                                  daily.           [facility]

 

     atomoxetine      -0      Yes            40mg      Take 40 mg           

[facility]



     40 mg      7-06                               by mouth           ity of



     capsule      00:00:                               daily.           [place]



               00                                                [facility]



                                                                 [facility]

 

     escitalopra      -0      Yes            10mg      Take 10 mg           

[facility]



     m oxalate      7-06                               by mouth 2           ity 

of



     10 mg      00:00:                               (two)           [place]



     tablet      00                                 times           [facility]



                                                  daily.           [facility]

 

     atomoxetine      -0      Yes            40mg      Take 40 mg           

[facility]



     40 mg      7-06                               by mouth           ity of



     capsule      00:00:                               daily.           [place]



               00                                                [facility]



                                                                 [facility]

 

     escitalopra      -0      Yes            10mg      Take 10 mg           

[facility]



     m oxalate      7-06                               by mouth 2           ity 

of



     10 mg      00:00:                               (two)           [place]



     tablet      00                                 times           [facility]



                                                  daily.           [facility]

 

     atomoxetine      -0      Yes            40mg      Take 40 mg           

[facility]



     40 mg      7-06                               by mouth           ity of



     capsule      00:00:                               daily.           [place]



               00                                                [facility]



                                                                 [facility]

 

     escitalopra      -0      Yes            10mg      Take 10 mg           

[facility]



     m oxalate      7-06                               by mouth 2           ity 

of



     10 mg      00:00:                               (two)           [place]



     tablet      00                                 times           [facility]



                                                  daily.           [facility]

 

     atomoxetine      -0      Yes            40mg      Take 40 mg           

[facility]



     40 mg      7-06                               by mouth           ity of



     capsule      00:00:                               daily.           [place]



               00                                                [facility]



                                                                 [facility]

 

     escitalopra      1-0      Yes            10mg      Take 10 mg           

[facility]



     m oxalate      7-06                               by mouth 2           ity 

of



     10 mg      00:00:                               (two)           [place]



     tablet      00                                 times           [facility]



                                                  daily.           [facility]

 

     atomoxetine      1-0      Yes            40mg      Take 40 mg           

[facility]



     40 mg      7-06                               by mouth           ity of



     capsule      00:00:                               daily.           [place]



               00                                                [facility]



                                                                 [facility]

 

     escitalopra      1-0      Yes            10mg      Take 10 mg           

[facility]



     m oxalate      7-06                               by mouth 2           ity 

of



     10 mg      00:00:                               (two)           [place]



     tablet      00                                 times           [facility]



                                                  daily.           [facility]

 

     atomoxetine      1-0      Yes            40mg      Take 40 mg           

[facility]



     40 mg      7-06                               by mouth           [facility]



     capsule      00:00:                               daily.           [place]



               00                                                [facility]



                                                                 [facility]

 

     escitalopra      1-0      Yes            10mg      Take 10 mg           

[facility]



     m oxalate      7-06                               by mouth 2           [facility] 

of



     10 mg      00:00:                               (two)           [place]



     tablet      00                                 times           [facility]



                                                  daily.           [facility]

 

     atomoxetine      1-0      Yes            40mg      Take 40 mg           

[facility]



     40 mg      7-06                               by mouth           [facility]



     capsule      00:00:                               daily.           [place]



               00                                                [facility]



                                                                 [facility]

 

     escitalopra      1-0      Yes            10mg      Take 10 mg           

[facility]



     m oxalate      7-06                               by mouth 2           [facility] 

of



     10 mg      00:00:                               (two)           [place]



     tablet      00                                 times           [facility]



                                                  daily.           [facility]

 

     atomoxetine      1-0      Yes            40mg      Take 40 mg           

[facility]



     40 mg      7-06                               by mouth           [facility]



     capsule      00:00:                               daily.           [place]



               00                                                [facility]



                                                                 [facility]

 

     escitalopra      1-0      Yes            10mg      Take 10 mg           

[facility]



     m oxalate      7-06                               by mouth 2           [facility] 

of



     10 mg      00:00:                               (two)           [place]



     tablet      00                                 times           [facility]



                                                  daily.           [facility]

 

     atomoxetine      1-0      Yes            40mg      Take 40 mg           

[facility]



     40 mg      7-06                               by mouth           [facility]



     capsule      00:00:                               daily.           [place]



               00                                                [facility]



                                                                 [facility]

 

     escitalopra      1-0      Yes            10mg      Take 10 mg           

[facility]



     m oxalate      7-06                               by mouth 2           [facility] 

[facility]



     10 mg      00:00:                               (two)           [place]



     tablet      00                                 times           [facility]



                                                  daily.           [facility]

 

     atomoxetine      2021-0      Yes            40mg      Take 40 mg           

[facility]



     40 mg      7-06                               by mouth           [facility] of



     capsule      00:00:                               daily.           [place]



               00                                                [facility]



                                                                 [facility]

 

     escitalopra      1-0      Yes            10mg      Take 10 mg           

[facility]



     m oxalate      7-06                               by mouth 2           [facility] 

of



     10 mg      00:00:                               (two)           [place]



     tablet      00                                 times           [facility]



                                                  daily.           [facility]

 

     atomoxetine      1-0      Yes            40mg      Take 40 mg           

[facility]



     40 mg      7-06                               by mouth           [facility] of



     capsule      00:00:                               daily.           [place]



               00                                                [facility]



                                                                 [facility]

 

     escitalopra      1-0      Yes            10mg      Take 10 mg           

[facility]



     m oxalate      7-06                               by mouth 2           [facility] 

[facility]



     10 mg      00:00:                               (two)           [place]



     tablet      00                                 times           [facility]



                                                  daily.           [facility]

 

     atomoxetine      1-0      Yes            40mg      Take 40 mg           

[facility]



     40 mg      7-06                               by mouth           [facility] of



     capsule      00:00:                               daily.           [place]



               00                                                [facility]



                                                                 [facility]

 

     escitalopra      1-0      Yes            10mg      Take 10 mg           

[facility]



     m oxalate      7-06                               by mouth 2           [facility] 

of



     10 mg      00:00:                               (two)           [place]



     tablet      00                                 times           [facility]



                                                  daily.           [facility]

 

     atomoxetine      1-0      Yes            40mg      Take 40 mg           

[facility]



     40 mg      7-06                               by mouth           [facility] of



     capsule      00:00:                               daily.           [place]



               00                                                [facility]



                                                                 [facility]

 

     escitalopra      1-0      Yes            10mg      Take 10 mg           

[facility]



     m oxalate      7-06                               by mouth 2           [facility] 

[facility]



     10 mg      00:00:                               (two)           [place]



     tablet      00                                 times           [facility]



                                                  daily.           [facility]

 

     atomoxetine      1-0      Yes            40mg      Take 40 mg           

[facility]



     40 mg      7-06                               by mouth           [facility] of



     capsule      00:00:                               daily.           [place]



               00                                                [facility]



                                                                 [facility]

 

     escitalopra      1-0      Yes            10mg      Take 10 mg           

[facility]



     m oxalate      7-06                               by mouth 2           [facility] 

of



     10 mg      00:00:                               (two)           [place]



     tablet      00                                 times           [facility]



                                                  daily.           [facility]

 

     atomoxetine      1-0      Yes            40mg      Take 40 mg           

[facility]



     40 mg      7-06                               by mouth           [facility] of



     capsule      00:00:                               daily.           [place]



               00                                                [facility]



                                                                 [facility]

 

     escitalopra      1-0      Yes            10mg      Take 10 mg           

[facility]



     m oxalate      7-06                               by mouth 2           [facility] 

of



     10 mg      00:00:                               (two)           [place]



     tablet      00                                 times           [facility]



                                                  daily.           [facility]

 

     atomoxetine      1-0      Yes            40mg      Take 40 mg           

[facility]



     40 mg      7-06                               by mouth           [facility]



     capsule      00:00:                               daily.           [place]



               00                                                [facility]



                                                                 [facility]

 

     escitalopra      1-0      Yes            10mg      Take 10 mg           

[facility]



     m oxalate      7-06                               by mouth 2           [facility] 

[facility]



     10 mg      00:00:                               (two)           [place]



     tablet      00                                 times           [facility]



                                                  daily.           [facility]

 

     atomoxetine      1-0      Yes            40mg      Take 40 mg           

[facility]



     40 mg      7-06                               by mouth           [facility]



     capsule      00:00:                               daily.           [place]



               00                                                [facility]



                                                                 [facility]

 

     escitalopra      1-0      Yes            10mg      Take 10 mg           

[facility]



     m oxalate      7-06                               by mouth 2           [facility] 

[facility]



     10 mg      00:00:                               (two)           [place]



     tablet      00                                 times           [facility]



                                                  daily.           [facility]

 

     atomoxetine      1-0      Yes            40mg      Take 40 mg           

[facility]



     40 mg      7-06                               by mouth           [facility]



     capsule      00:00:                               daily.           [place]



               00                                                [facility]



                                                                 [facility]

 

     escitalopra      1-0      Yes            10mg      Take 10 mg           

[facility]



     m oxalate      7-06                               by mouth 2           [facility] 

of



     10 mg      00:00:                               (two)           [place]



     tablet      00                                 times           [facility]



                                                  daily.           [facility]

 

     atomoxetine      1-0      Yes            40mg      Take 40 mg           

[facility]



     40 mg      7-06                               by mouth           [facility] of



     capsule      00:00:                               daily.           [place]



               00                                                [facility]



                                                                 [facility]

 

     escitalopra      1-0      Yes            10mg      Take 10 mg           

[facility]



     m oxalate      7-06                               by mouth 2           [facility] 

of



     10 mg      00:00:                               (two)           [place]



     tablet      00                                 times           [facility]



                                                  daily.           [facility]

 

     atomoxetine      1-0      Yes            40mg      Take 40 mg           

[facility]



     40 mg      7-06                               by mouth           [facility] of



     capsule      00:00:                               daily.           [place]



               00                                                [facility]



                                                                 [facility]

 

     escitalopra      1-0      Yes            10mg      Take 10 mg           

[facility]



     m oxalate      7-06                               by mouth 2           [facility] 

of



     10 mg      00:00:                               (two)           [place]



     tablet      00                                 times           [facility]



                                                  daily.           [facility]

 

     atomoxetine      1-0      Yes            40mg      Take 40 mg           

[facility]



     40 mg      7-06                               by mouth           ity of



     capsule      00:00:                               daily.           [place]



               00                                                [facility]



                                                                 [facility]

 

     escitalopra      1-0      Yes            10mg      Take 10 mg           

[facility]



     m oxalate      7-06                               by mouth 2           [facility] 

of



     10 mg      00:00:                               (two)           [place]



     tablet      00                                 times           [facility]



                                                  daily.           [facility]

 

     atomoxetine      1-0      Yes            40mg      Take 40 mg           

[facility]



     40 mg      7-06                               by mouth           ity of



     capsule      00:00:                               daily.           [place]



               00                                                [facility]



                                                                 [facility]

 

     escitalopra      1-0      Yes            10mg      Take 10 mg           

[facility]



     m oxalate      7-06                               by mouth 2           [facility] 

of



     10 mg      00:00:                               (two)           [place]



     tablet      00                                 times           [facility]



                                                  daily.           [facility]

 

     atomoxetine      2021-0      Yes            40mg      Take 40 mg           

[facility]



     40 mg      7-06                               by mouth           ity of



     capsule      00:00:                               daily.           [place]



               00                                                [facility]



                                                                 [facility]

 

     escitalopra      2021-0      Yes            10mg      Take 10 mg           

[facility]



     m oxalate      7-06                               by mouth 2           [facility] 

of



     10 mg      00:00:                               (two)           [place]



     tablet      00                                 times           [facility]



                                                  daily.           [facility]

 

     atomoxetine      0      Yes            40mg      Take 40 mg           

[facility]



     40 mg      7-06                               by mouth           ity of



     capsule      00:00:                               daily.           [place]



               00                                                [facility]



                                                                 [facility]

 

     escitalopra      -0      Yes            10mg      Take 10 mg           

[facility]



     m oxalate      7-06                               by mouth 2           [facility] 

of



     10 mg      00:00:                               (two)           [place]



     tablet      00                                 times           [facility]



                                                  daily.           [facility]

 

     escitalopra      -0      Yes            10mg      Take 10 mg           

[facility]



     m oxalate      7-06                               by mouth           [facility] of



     10 mg      00:00:                               at             [place]



     tablet      00                                 bedtime.           [facility]



                                                                 [facility]

 

     escitalopra      -0      Yes            10mg      Take 10 mg           

[facility]



     m oxalate      7-06                               by mouth           ity of



     10 mg      00:00:                               at             [place]



     tablet      00                                 bedtime.           [facility]



                                                                 [facility]

 

     escitalopra      -0      Yes            10mg      Take 10 mg           

[facility]



     m oxalate      7-06                               by mouth           ity of



     10 mg      00:00:                               at             [place]



     tablet      00                                 bedtime.           [facility]



                                                                 [facility]

 

     escitalopra      -0      Yes            10mg      Take 10 mg           

[facility]



     m oxalate      7-06                               by mouth           ity of



     10 mg      00:00:                               at             [place]



     tablet      00                                 bedtime.           [facility]



                                                                 [facility]

 

     atomoxetine      -0 2- No             40mg      Take 40 mg          

 [facility]



     40 mg       12-12                          by mouth           ity of



     capsule      00:00: 00:00                          daily.           [place]



               00   :00                                          [facility]



                                                                 [facility]

 

     atomoxetine      -0 2- No             40mg      Take 40 mg          

 [facility]



     40 mg       12-12                          by mouth           ity of



     capsule      00:00: 00:00                          daily.           [place]



               00   :00                                          [facility]



                                                                 [facility]

 

     cetirizine      -0      No             1mg                      



     10 mg      6-10                                              



     tablet      00:00:                                              



               00                                                

 

     fluticasone      -0      No             2mcg/ac                     



     propionate      6-10                     tuation                     



     50        00:00:                                              



     mcg/actuati      00                                                



     on nasal                                                        



     spray,suspe                                                        



     nsion                                                        

 

     Tessalon      -0      No             1mg                      



     Perles 100      6-10                                              



     mg capsule      00:00:                                              



               00                                                

 

     cetirizine      1-0      No             1mg                      



     10 mg      6-10                                              



     tablet      00:00:                                              



               00                                                

 

     fluticasone      -0      No             2mcg/ac                     



     propionate      6-10                     tuation                     



     50        00:00:                                              



     mcg/actuati      00                                                



     on nasal                                                        



     spray,suspe                                                        



     nsion                                                        

 

     Tessalon      -0      No             1mg                      



     Perles 100      6-10                                              



     mg capsule      00:00:                                              



               00                                                

 

     Dose      2021-0      No                                      



     Unknown      6-10                                              



               00:00:                                              



               00                                                

 

     Dose      2021-0      No                                      



     Unknown      6-10                                              



               00:00:                                              



               00                                                

 

     Dose      2021-0      No                                      



     Unknown      6-10                                              



               00:00:                                              



               00                                                

 

     Dose      2021-0      No                                      



     Unknown      6-10                                              



               00:00:                                              



               00                                                

 

     Dose      2021-0      No                                      



     Unknown      6-10                                              



               00:00:                                              



               00                                                

 

     Dose      2021-0      No                                      



     Unknown      6-10                                              



               00:00:                                              



               00                                                

 

     Lexapro 10      2021-0      No             15mg                     



     mg tablet      6-08                                              



               00:00:                                              



               00                                                

 

     mirtazapine      2021-0      No             1mg                      



     15 mg      6-08                                              



     tablet      00:00:                                              



               00                                                

 

     Strattera      2021-0      No             1mg                      



     40 mg      6-08                                              



     capsule      00:00:                                              



               00                                                

 

     Lexapro 10      2021-0      No             15mg                     



     mg tablet      6-08                                              



               00:00:                                              



               00                                                

 

     mirtazapine      2021-0      No             1mg                      



     15 mg      6-08                                              



     tablet      00:00:                                              



               00                                                

 

     Strattera      2021-0      No             1mg                      



     40 mg      6-08                                              



     capsule      00:00:                                              



               00                                                

 

     Lexapro 10      2021-0      No             15mg                     



     mg tablet      6-08                                              



               00:00:                                              



               00                                                

 

     mirtazapine      2021-0      No             1mg                      



     15 mg      6-08                                              



     tablet      00:00:                                              



               00                                                

 

     Strattera      2021-0      No             1mg                      



     40 mg      6-08                                              



     capsule      00:00:                                              



               00                                                

 

     Lexapro 10      2021-0      No             15mg                     



     mg tablet      6-08                                              



               00:00:                                              



               00                                                

 

     mirtazapine      2021-0      No             1mg                      



     15 mg      6-08                                              



     tablet      00:00:                                              



               00                                                

 

     Str[place]a      2021-0      No             1mg                      



     40 mg      6-08                                              



     capsule      00:00:                                              



               00                                                

 

     Lexapro 10      2021-0      No             15mg                     



     mg tablet      5-13                                              



               00:00:                                              



               00                                                

 

     mirtazapine      2021-0      No             1mg                      



     15 mg      5-13                                              



     tablet      00:00:                                              



               00                                                

 

     [place]      2021-0      No             1mg                      



     40 mg      5-13                                              



     capsule      00:00:                                              



               00                                                

 

     Lexapro 10      2021-0      No             15mg                     



     mg tablet      5-13                                              



               00:00:                                              



               00                                                

 

     mirtazapine      2021-0      No             1mg                      



     15 mg      5-13                                              



     tablet      00:00:                                              



               00                                                

 

     [place]      2021-0      No             1mg                      



     40 mg      5-13                                              



     capsule      00:00:                                              



               00                                                

 

     Lexapro 10      2021-0      No             15mg                     



     mg tablet      5-13                                              



               00:00:                                              



               00                                                

 

     mirtazapine      2021-0      No             1mg                      



     15 mg      5-13                                              



     tablet      00:00:                                              



               00                                                

 

     [place]      2021-0      No             1mg                      



     40 mg      5-13                                              



     capsule      00:00:                                              



               00                                                

 

     Lexapro 10      2021-0      No             15mg                     



     mg tablet      5-13                                              



               00:00:                                              



               00                                                

 

     mirtazapine      2021-0      No             1mg                      



     15 mg      5-13                                              



     tablet      00:00:                                              



               00                                                

 

     [place]      2021-0      No             1mg                      



     40 mg      5-13                                              



     capsule      00:00:                                              



               00                                                

 

     Tessalon      2021-0      No             1mg                      



     Perles 100      5-07                                              



     mg capsule      00:00:                                              



               00                                                

 

     Tessalon      2021-0      No             1mg                      



     Perles 100      5-07                                              



     mg capsule      00:00:                                              



               00                                                

 

     Dose      2021-0      No                                      



     Unknown      5-07                                              



               00:00:                                              



               00                                                

 

     Dose      2021-0      No                                      



     Unknown      5-07                                              



               00:00:                                              



               00                                                

 

     cyclobenzap      2021-0      No             12mg                     



     rine 5 mg      4-20                                              



     tablet      00:00:                                              



               00                                                

 

     cyclobenzap      2021-0      No             12mg                     



     rine 5 mg      4-20                                              



     tablet      00:00:                                              



               00                                                

 

     cyclobenzap      2021-0      No             12mg                     



     rine 5 mg      4-20                                              



     tablet      00:00:                                              



               00                                                

 

     cyclobenzap      2021-0      No             12mg                     



     rine 5 mg      4-20                                              



     tablet      00:00:                                              



               00                                                

 

     Lexapro 10      2021-0      No             15mg                     



     mg tablet      4-19                                              



               00:00:                                              



               00                                                

 

     mirtazapine      2021-0      No             1mg                      



     15 mg      4-19                                              



     tablet      00:00:                                              



               00                                                

 

     [place]      2021-0      No             1mg                      



     40 mg      4-19                                              



     capsule      00:00:                                              



               00                                                

 

     Lexapro 10      2021-0      No             15mg                     



     mg tablet      4-19                                              



               00:00:                                              



               00                                                

 

     mirtazapine      2021-0      No             1mg                      



     15 mg      4-19                                              



     tablet      00:00:                                              



               00                                                

 

     [place]      2021-0      No             1mg                      



     40 mg      4-19                                              



     capsule      00:00:                                              



               00                                                

 

     Lexapro 10      2021-0      No             15mg                     



     mg tablet      4-19                                              



               00:00:                                              



               00                                                

 

     mirtazapine      2021-0      No             1mg                      



     15 mg      4-19                                              



     tablet      00:00:                                              



               00                                                

 

     [place]      2021-0      No             1mg                      



     40 mg      4-19                                              



     capsule      00:00:                                              



               00                                                

 

     Lexapro 10      2021-0      No             15mg                     



     mg tablet      4-19                                              



               00:00:                                              



               00                                                

 

     mirtazapine      2021-0      No             1mg                      



     15 mg      4-19                                              



     tablet      00:00:                                              



               00                                                

 

     [place]      2021-0      No             1mg                      



     40 mg      4-19                                              



     capsule      00:00:                                              



               00                                                

 

     ibuprofen      2021-0      No             1mg                      



     800 mg      4-09                                              



     tablet      00:00:                                              



               00                                                

 

     ibuprofen      2021-0      No             1mg                      



     800 mg      4-09                                              



     tablet      00:00:                                              



               00                                                

 

     ibuprofen      2021-0      No             1mg                      



     800 mg      4-09                                              



     tablet      00:00:                                              



               00                                                

 

     ibuprofen      2021-0      No             1mg                      



     800 mg      4-09                                              



     tablet      00:00:                                              



               00                                                

 

     Lexapro 10      2021-0      No             15mg                     



     mg tablet      3-26                                              



               00:00:                                              



               00                                                

 

     Lexapro 10      2021-0      No             15mg                     



     mg tablet      3-26                                              



               00:00:                                              



               00                                                

 

     mirtazapine      2021-0      No             1mg                      



     15 mg      3-26                                              



     tablet      00:00:                                              



               00                                                

 

     mirtazapine      2021-0      No             1mg                      



     15 mg      3-26                                              



     tablet      00:00:                                              



               00                                                

 

     [place]      2021-0      No             1mg                      



     40 mg      3-26                                              



     capsule      00:00:                                              



               00                                                

 

     [place]      2021-0      No             1mg                      



     40 mg      3-26                                              



     capsule      00:00:                                              



               00                                                

 

     Lexapro 10      2021-0      No             15mg                     



     mg tablet      3-26                                              



               00:00:                                              



               00                                                

 

     mirtazapine      2021-0      No             1mg                      



     15 mg      3-26                                              



     tablet      00:00:                                              



               00                                                

 

     [place]      2021-0      No             1mg                      



     40 mg      3-26                                              



     capsule      00:00:                                              



               00                                                

 

     Lexapro 10      1-0      No             15mg                     



     mg tablet      3-26                                              



               00:00:                                              



               00                                                

 

     mirtazapine      2021-0      No             1mg                      



     15 mg      3-26                                              



     tablet      00:00:                                              



               00                                                

 

     [place]      2021-0      No             1mg                      



     40 mg      3-26                                              



     capsule      00:00:                                              



               00                                                

 

     lisinopril      2021-0      No             1mg                      



     10 mg      2-26                                              



     tablet      00:00:                                              



               00                                                

 

     lisinopril      2021-0      No             1mg                      



     10 mg      2-26                                              



     tablet      00:00:                                              



               00                                                

 

     lisinopril      2021-0      No             1mg                      



     10 mg      2-26                                              



     tablet      00:00:                                              



               00                                                

 

     lisinopril      2021-0      No             1mg                      



     10 mg      2-26                                              



     tablet      00:00:                                              



               00                                                

 

     Flonase      1-0      No             12mcg/a                     



     Allergy      2-16                     ctuatio                     



     Relief 50      00:00:                     n                        



     mcg/actuati      00                                                



     on nasal                                                        



     spray,suspe                                                        



     nsion                                                        

 

     Bromfed DM      -0      No             75mg/5                     



     2 mg-30      2-16                     mL                       



     mg-10 mg/5      00:00:                                              



     mL oral      00                                                



     syrup                                                        

 

     Flonase      1-0      No             12mcg/a                     



     Allergy      2-16                     ctuatio                     



     Relief 50      00:00:                     n                        



     mcg/actuati      00                                                



     on nasal                                                        



     spray,suspe                                                        



     nsion                                                        

 

     Bromfed DM      1-0      No             75mg/5                     



     2 mg-30      2-16                     mL                       



     mg-10 mg/5      00:00:                                              



     mL oral      00                                                



     syrup                                                        

 

     Dose      1-0      No                                      



     Unknown      2-16                                              



               00:00:                                              



               00                                                

 

     Bromfed DM      1-0      No             75mg/5                     



     2 mg-30      2-16                     mL                       



     mg-10 mg/5      00:00:                                              



     mL oral      00                                                



     syrup                                                        

 

     Dose      1-0      No                                      



     Unknown      2-16                                              



               00:00:                                              



               00                                                

 

     Bromfed DM      -0      No             75mg/5                     



     2 mg-30      2-16                     mL                       



     mg-10 mg/5      00:00:                                              



     mL oral      00                                                



     syrup                                                        

 

     Lexapro 10      1-0      No             15mg                     



     mg tablet      2-01                                              



               00:00:                                              



               00                                                

 

     mirtazapine      1-0      No             1mg                      



     15 mg      2-01                                              



     tablet      00:00:                                              



               00                                                

 

     [place]      2021-0      No             1mg                      



     40 mg      2-01                                              



     capsule      00:00:                                              



               00                                                

 

     Lexapro 10      2021-0      No             15mg                     



     mg tablet      2-01                                              



               00:00:                                              



               00                                                

 

     mirtazapine      2021-0      No             1mg                      



     15 mg      2-01                                              



     tablet      00:00:                                              



               00                                                

 

     [place]      2021-0      No             1mg                      



     40 mg      2-01                                              



     capsule      00:00:                                              



               00                                                

 

     Lexapro 10      1-0      No             15mg                     



     mg tablet      2-01                                              



               00:00:                                              



               00                                                

 

     mirtazapine      2021-0      No             1mg                      



     15 mg      2-01                                              



     tablet      00:00:                                              



               00                                                

 

     [place]      2021-0      No             1mg                      



     40 mg      2-01                                              



     capsule      00:00:                                              



               00                                                

 

     Lexapro 10      2021-0      No             15mg                     



     mg tablet      2-01                                              



               00:00:                                              



               00                                                

 

     mirtazapine      2021-0      No             1mg                      



     15 mg      2-01                                              



     tablet      00:00:                                              



               00                                                

 

     [place]      2021-0      No             1mg                      



     40 mg      2-01                                              



     capsule      00:00:                                              



               00                                                

 

     Lexapro 10      2021-0      No             15mg                     



     mg tablet      1-02                                              



               00:00:                                              



               00                                                

 

     mirtazapine      2021-0      No             1mg                      



     15 mg      1-02                                              



     tablet      00:00:                                              



               00                                                

 

     [place]      2021-0      No             1mg                      



     40 mg      1-02                                              



     capsule      00:00:                                              



               00                                                

 

     Lexapro 10      1-0      No             15mg                     



     mg tablet      1-02                                              



               00:00:                                              



               00                                                

 

     mirtazapine      2021-0      No             1mg                      



     15 mg      1-02                                              



     tablet      00:00:                                              



               00                                                

 

     [place]      1-0      No             1mg                      



     40 mg      1-02                                              



     capsule      00:00:                                              



               00                                                

 

     Lexapro 10      1-0      No             15mg                     



     mg tablet      1-02                                              



               00:00:                                              



               00                                                

 

     mirtazapine      1-0      No             1mg                      



     15 mg      1-02                                              



     tablet      00:00:                                              



               00                                                

 

     Str[place]a      1-0      No             1mg                      



     40 mg      1-02                                              



     capsule      00:00:                                              



               00                                                

 

     Lexapro 10      -0      No             15mg                     



     mg tablet      -02                                              



               00:00:                                              



               00                                                

 

     mirtazapine      1-0      No             1mg                      



     15 mg      1-02                                              



     tablet      00:00:                                              



               00                                                

 

     [place]a      -0      No             1mg                      



     40 mg      1-02                                              



     capsule      00:00:                                              



               00                                                

 

     Lexapro 10      -1      No             15mg                     



     mg tablet      1-30                                              



               00:00:                                              



               00                                                

 

     mirtazapine      2020-1      No             1mg                      



     15 mg      1-30                                              



     tablet      00:00:                                              



               00                                                

 

     [place]a      2020-1      No             1mg                      



     40 mg      1-30                                              



     capsule      00:00:                                              



               00                                                

 

     Lexapro 10      -1      No             15mg                     



     mg tablet      1-30                                              



               00:00:                                              



               00                                                

 

     mirtazapine      2020-1      No             1mg                      



     15 mg      1-30                                              



     tablet      00:00:                                              



               00                                                

 

     [place]a      2020-1      No             1mg                      



     40 mg      1-30                                              



     capsule      00:00:                                              



               00                                                

 

     Lexapro 10      -1      No             15mg                     



     mg tablet      1-30                                              



               00:00:                                              



               00                                                

 

     mirtazapine      2020-1      No             1mg                      



     15 mg      1-30                                              



     tablet      00:00:                                              



               00                                                

 

     [place]a      2020-1      No             1mg                      



     40 mg      1-30                                              



     capsule      00:00:                                              



               00                                                

 

     Lexapro 10      -1      No             15mg                     



     mg tablet      1-30                                              



               00:00:                                              



               00                                                

 

     mirtazapine      2020-1      No             1mg                      



     15 mg      1-30                                              



     tablet      00:00:                                              



               00                                                

 

     [place]a      2020-1      No             1mg                      



     40 mg      1-30                                              



     capsule      00:00:                                              



               00                                                

 

     Lexapro 10      -1      No             15mg                     



     mg tablet      1-16                                              



               00:00:                                              



               00                                                

 

     mirtazapine      2020-1      No             1mg                      



     15 mg      1-16                                              



     tablet      00:00:                                              



               00                                                

 

     Str[place]a      2020-1      No             1mg                      



     40 mg      1-16                                              



     capsule      00:00:                                              



               00                                                

 

     Lexapro 10      -      No             15mg                     



     mg tablet      1-16                                              



               00:00:                                              



               00                                                

 

     mirtazapine      -      No             1mg                      



     15 mg      1-16                                              



     tablet      00:00:                                              



               00                                                

 

     Str[place]a      -      No             1mg                      



     40 mg      1-16                                              



     capsule      00:00:                                              



               00                                                

 

     Lexapro 10      -      No             15mg                     



     mg tablet      1-16                                              



               00:00:                                              



               00                                                

 

     mirtazapine      -      No             1mg                      



     15 mg      1-16                                              



     tablet      00:00:                                              



               00                                                

 

     Str[place]a      -      No             1mg                      



     40 mg      1-16                                              



     capsule      00:00:                                              



               00                                                

 

     Lexapro 10      -      No             15mg                     



     mg tablet      1-16                                              



               00:00:                                              



               00                                                

 

     mirtazapine      -      No             1mg                      



     15 mg      1-16                                              



     tablet      00:00:                                              



               00                                                

 

     [place]a      -      No             1mg                      



     40 mg      1-16                                              



     capsule      00:00:                                              



               00                                                

 

     Lexapro 10      -      No             1mg                      



     mg tablet      1-02                                              



               00:00:                                              



               00                                                

 

     mirtazapine      -      No             1mg                      



     15 mg      1-02                                              



     tablet      00:00:                                              



               00                                                

 

     Str[place]a      -      No             1mg                      



     40 mg      1-02                                              



     capsule      00:00:                                              



               00                                                

 

     Lexapro 10      -      No             1mg                      



     mg tablet      1-02                                              



               00:00:                                              



               00                                                

 

     mirtazapine      -      No             1mg                      



     15 mg      1-02                                              



     tablet      00:00:                                              



               00                                                

 

     [place]a      -      No             1mg                      



     40 mg      1-02                                              



     capsule      00:00:                                              



               00                                                

 

     Lexapro 10      -      No             1mg                      



     mg tablet      1-02                                              



               00:00:                                              



               00                                                

 

     mirtazapine      -      No             1mg                      



     15 mg      1-02                                              



     tablet      00:00:                                              



               00                                                

 

     [place]a      -      No             1mg                      



     40 mg      1-02                                              



     capsule      00:00:                                              



               00                                                

 

     Lexapro 10      -      No             1mg                      



     mg tablet      1-02                                              



               00:00:                                              



               00                                                

 

     mirtazapine      -      No             1mg                      



     15 mg      1-02                                              



     tablet      00:00:                                              



               00                                                

 

     Str[place]a      -      No             1mg                      



     40 mg      1-02                                              



     capsule      00:00:                                              



               00                                                

 

     lisinopril      -      No             1mg                      



     10 mg      0-30                                              



     tablet      00:00:                                              



               00                                                

 

     lisinopril      -      No             1mg                      



     10 mg      0-30                                              



     tablet      00:00:                                              



               00                                                

 

     lisinopril      -      No             1mg                      



     10 mg      0-30                                              



     tablet      00:00:                                              



               00                                                

 

     lisinopril      2020-1      No             1mg                      



     10 mg      0-30                                              



     tablet      00:00:                                              



               00                                                

 

     metronidazo      2020-1      No             1mg                      



     le 500 mg      0-29                                              



     tablet      00:00:                                              



               00                                                

 

     Dose      2020-1      No                                      



     Unknown      0-29                                              



               00:00:                                              



               00                                                

 

     Dose      2020-1      No                                      



     Unknown      0-29                                              



               00:00:                                              



               00                                                

 

     metronidazo      2020-1      No             1mg                      



     le 500 mg      0-29                                              



     tablet      00:00:                                              



               00                                                

 

     Lexapro 10      -1      No             1mg                      



     mg tablet      0-19                                              



               00:00:                                              



               00                                                

 

     mirtazapine      2020-1      No             1mg                      



     15 mg      0-19                                              



     tablet      00:00:                                              



               00                                                

 

     [place]a      2020-1      No             1mg                      



     40 mg      0-19                                              



     capsule      00:00:                                              



               00                                                

 

     Lexapro 10      -1      No             1mg                      



     mg tablet      0-19                                              



               00:00:                                              



               00                                                

 

     mirtazapine      2020-1      No             1mg                      



     15 mg      0-19                                              



     tablet      00:00:                                              



               00                                                

 

     [place]a      2020-1      No             1mg                      



     40 mg      0-19                                              



     capsule      00:00:                                              



               00                                                

 

     Lexapro 10      -1      No             1mg                      



     mg tablet      0-19                                              



               00:00:                                              



               00                                                

 

     mirtazapine      2020-1      No             1mg                      



     15 mg      0-19                                              



     tablet      00:00:                                              



               00                                                

 

     Lexapro 10      -1      No             1mg                      



     mg tablet      0-19                                              



               00:00:                                              



               00                                                

 

     mirtazapine      2020-1      No             1mg                      



     15 mg      0-19                                              



     tablet      00:00:                                              



               00                                                

 

     Str[place]a      2020-1      No             1mg                      



     40 mg      0-19                                              



     capsule      00:00:                                              



               00                                                

 

     Strattera      2020-1      No             1mg                      



     40 mg      0-19                                              



     capsule      00:00:                                              



               00                                                

 

     lisinopril      2020-0      No             1mg                      



     10 mg      9-11                                              



     tablet      00:00:                                              



               00                                                

 

     lisinopril      2020-0      No             1mg                      



     10 mg      9-11                                              



     tablet      00:00:                                              



               00                                                

 

     lisinopril      2020-0      No             1mg                      



     10 mg      9-11                                              



     tablet      00:00:                                              



               00                                                

 

     lisinopril      2020-0      No             1mg                      



     10 mg      9-11                                              



     tablet      00:00:                                              



               00                                                

 

     lisinopril      2020-0      No             1mg                      



     10 mg      8-01                                              



     tablet      00:00:                                              



               00                                                

 

     lisinopril      2020-0      No             1mg                      



     10 mg      8-01                                              



     tablet      00:00:                                              



               00                                                

 

     lisinopril      2020-0      No             1mg                      



     10 mg      8-01                                              



     tablet      00:00:                                              



               00                                                

 

     lisinopril      2020-0      No             1mg                      



     10 mg      8-01                                              



     tablet      00:00:                                              



               00                                                

 

     ibuprofen      2020-0      No             1mg                      



     600 mg      5-13                                              



     tablet      00:00:                                              



               00                                                

 

     ibuprofen      2020-0      No             1mg                      



     600 mg      5-13                                              



     tablet      00:00:                                              



               00                                                

 

     ibuprofen      2020-0      No             1mg                      



     600 mg      5-13                                              



     tablet      00:00:                                              



               00                                                

 

     ibuprofen      2020-0      No             1mg                      



     600 mg      5-13                                              



     tablet      00:00:                                              



               00                                                







Immunizations







           Ordered    Filled Immunization Date       Status     Comments   [facility]

e



           Immunization Name Name                                        

 

           Influenza Virus            2022 Completed             [facility]



           [facility],            00:00:00                         [place] [facility]

[facility]



           Preserv and ABX                                             Branch



           Free 6 MO-64 YRS                                             

 

           Influenza Virus            2022 Completed             [facility]



           [facility],            00:00:00                         [place] [facility]

[facility]



           Preserv and ABX                                             Branch



           Free 6 MO-64 YRS                                             

 

           Influenza Virus            2022 Completed             [facility]



           [facility],            00:00:00                         [place] [facility]

[facility]



           Preserv and ABX                                             Branch



           Free 6 MO-64 YRS                                             

 

           Influenza Virus            2022 Completed             [facility]



           [facility],            00:00:00                         [place] [facility]

[facility]



           Preserv and ABX                                             Branch



           Free 6 MO-64 YRS                                             

 

           Influenza Virus            2022 Completed             [facility]



           [facility],            00:00:00                         [place] [facility]

[facility]



           Preserv and ABX                                             Branch



           Free 6 MO-64 YRS                                             

 

           Influenza Virus            2022 Completed             [facility]



           [facility],            00:00:00                         [place] [facility]

[facility]



           Preserv and ABX                                             Branch



           Free 6 MO-64 YRS                                             

 

           Influenza Virus            2022 Completed             [facility]



           [facility],            00:00:00                         [place] [facility]

[facility]



           Preserv and ABX                                             Branch



           Free 6 MO-64 YRS                                             

 

           Influenza Virus            2022 Completed             [facility]

[facility]



           [facility],            00:00:00                         [place] [facility]

[facility]



           Preserv and ABX                                             Branch



           Free 6 MO-64 YRS                                             

 

           Influenza Virus            2022 Completed             [facility]

[facility]



           [facility],            00:00:00                         [place] [facility]

[facility]



           Preserv and ABX                                             Branch



           Free 6 MO-64 YRS                                             

 

           Influenza Virus            2022 Completed             [facility]



           [facility]            00:00:00                         [facility]



           Preserv and ABX                                             [facility]anch



           Free 6 MO-64 YRS                                             

 

           Influenza Virus            2022 Completed             [facility]



           [facility],            00:00:00                         [facility]



           Preserv and ABX                                             Branch



           Free 6 MO-64 YRS                                             

 

           Influenza Virus            2022 Completed             [facility]



           [facility],            00:00:00                         [facility]



           Preserv and ABX                                             Branch



           Free 6 MO-64 YRS                                             

 

           Influenza Virus            2022 Completed             [facility]



           [facility],            00:00:00                         [facility]



           Preserv and ABX                                             Branch



           Free 6 MO-64 YRS                                             

 

           Influenza Virus            2022 Completed             [facility]



           [facility],            00:00:00                         [facility]



           Preserv and ABX                                             Branch



           Free 6 MO-64 YRS                                             

 

           Influenza Virus            2022 Completed             [facility]



           [facility],            00:00:00                         [facility]



           Preserv and ABX                                             Branch



           Free 6 MO-64 YRS                                             

 

           Influenza Virus            2022 Completed             [facility]



           [facility],            00:00:00                         [facility]



           Preserv and ABX                                             Branch



           Free 6 MO-64 YRS                                             

 

           Influenza Virus            2022 Completed             [facility]



           [facility],            00:00:00                         [facility]



           Preserv and ABX                                             Branch



           Free 6 MO-64 YRS                                             

 

           Influenza Virus            2022 Completed             [facility]



           [facility],            00:00:00                         [facility]



           Preserv and ABX                                             [facility]anch



           Free 6 MO-64 YRS                                             

 

           Influenza Virus            2022 Completed             [facility]



           [facility],            00:00:00                         [facility]



           Preserv and ABX                                             Branch



           Free 6 MO-64 YRS                                             

 

           Influenza Virus            2022 Completed             [facility]



           [facility],            00:00:00                         [facility]



           Preserv and ABX                                             Branch



           Free 6 MO-64 YRS                                             

 

           Influenza Virus            2022 Completed             [facility]



           [facility],            00:00:00                         [facility]



           Preserv and ABX                                             Branch



           Free 6 MO-64 YRS                                             

 

           Influenza Virus            2022 Completed             [facility]



           [facility],            00:00:00                         [facility]



           Preserv and ABX                                             Branch



           Free 6 MO-64 YRS                                             

 

           Influenza Virus            2022 Completed             [facility]



           [facility],            00:00:00                         [facility]



           Preserv and ABX                                             Branch



           Free 6 MO-64 YRS                                             

 

           Influenza Virus            2022 Completed             [facility]



           [facility],            00:00:00                         [facility]



           Preserv and ABX                                             Branch



           Free 6 MO-64 YRS                                             

 

           Influenza Virus            2022 Completed             [facility]



           [facility],            00:00:00                         [facility]



           Preserv and ABX                                             Branch



           Free 6 MO-64 YRS                                             

 

           Influenza Virus            2022 Completed             [facility]



           [facility],            00:00:00                         [facility]



           Preserv and ABX                                             [facility]anch



           Free 6 MO-64 YRS                                             

 

           Influenza Virus            2022 Completed             [facility]



           [facility],            00:00:00                         [facility]



           Preserv and ABX                                             Branch



           Free 6 MO-64 YRS                                             

 

           Influenza Virus            2022 Completed             [facility]



           [facility],            00:00:00                         [facility]



           Preserv and ABX                                             Branch



           Free 6 MO-64 YRS                                             

 

           Influenza Virus            2022 Completed             [facility]

[facility]



           [facility],            00:00:00                         [facility]



           Preserv and ABX                                             Branch



           Free 6 MO-64 YRS                                             

 

           Influenza Virus            2022 Completed             [facility]

[facility]



           [facility],            00:00:00                         [facility]



           Preserv and ABX                                             Branch



           Free 6 MO-64 YRS                                             

 

           Influenza Virus            2022 Completed             [facility]

[facility]



           [facility],            00:00:00                         [facility]



           Preserv and ABX                                             Branch



           Free 6 MO-64 YRS                                             

 

           Influenza Virus            2022 Completed             [facility]

[facility]



           [facility],            00:00:00                         [facility]



           Preserv and ABX                                             Branch



           Free 6 MO-64 YRS                                             

 

           Influenza Virus            2022 Completed             [facility]



           [facility],            00:00:00                         [facility]



           Preserv and ABX                                             Branch



           Free 6 MO-64 YRS                                             

 

           Influenza Virus            2022 Completed             [facility]



           [facility],            00:00:00                         [facility]



           Preserv and ABX                                             Branch



           Free 6 MO-64 YRS                                             

 

           Influenza Virus            2022 Completed             [facility]



           [facility],            00:00:00                         [facility]



           Preserv and ABX                                             [facility]anch



           Free 6 MO-64 YRS                                             

 

           Influenza Virus            2022 Completed             [facility]



           [facility],            00:00:00                         [facility]



           Preserv and ABX                                             Branch



           Free 6 MO-64 YRS                                             

 

           Influenza Virus            2022 Completed             [facility]



           [facility],            00:00:00                         [facility]



           Preserv and ABX                                             Branch



           Free 6 MO-64 YRS                                             

 

           Influenza Virus            2022 Completed             [facility]



           [facility],            00:00:00                         [facility]



           Preserv and ABX                                             [facility]anch



           Free 6 MO-64 YRS                                             

 

           Influenza Virus            2022 Completed             [facility]



           [facility],            00:00:00                         [facility]



           Preserv and ABX                                             [facility]anch



           Free 6 MO-64 YRS                                             

 

           Influenza Virus            2022 Completed             [facility]

[facility]



           [facility],            00:00:00                         [facility]



           Preserv and ABX                                             [facility]anch



           Free 6 MO-64 YRS                                             

 

           Influenza Virus            2022 Completed             [facility]



           [facility],            00:00:00                         [facility]



           Preserv and ABX                                             Branch



           Free 6 MO-64 YRS                                             

 

           Influenza Virus            2022 Completed             [facility]



           [facility],            00:00:00                         [facility]



           Preserv and ABX                                             Branch



           Free 6 MO-64 YRS                                             

 

           Influenza Virus            2022 Completed             [facility]



           [facility],            00:00:00                         [facility]



           Preserv and ABX                                             [facility]anch



           Free 6 MO-64 YRS                                             

 

           Influenza Virus            2022 Completed             [facility]



           [facility],            00:00:00                         [facility]



           Preserv and ABX                                             [facility]anch



           Free 6 MO-64 YRS                                             

 

           Influenza Virus            2022 Completed             [facility]



           [facility],            00:00:00                         [facility]



           Preserv and ABX                                             Branch



           Free 6 MO-64 YRS                                             

 

           Influenza Virus            2022 Completed             [facility]



           [facility],            00:00:00                         [facility]



           Preserv and ABX                                             [facility]anch



           Free 6 MO-64 YRS                                             

 

           Influenza Virus            2022 Completed             [facility]



           [facility],            00:00:00                         [facility]



           Preserv and ABX                                             [facility]anch



           Free 6 MO-64 YRS                                             

 

           Influenza Virus            2022 Completed             [facility]



           [facility],            00:00:00                         [facility]



           Preserv and ABX                                             [facility]anch



           Free 6 MO-64 YRS                                             

 

           Influenza Virus            2022 Completed             [facility]

[facility]



           [facility],            00:00:00                         [facility]



           Preserv and ABX                                             [facility]anch



           Free 6 MO-64 YRS                                             

 

           Influenza Virus            2022 Completed             [facility]

[facility]



           [facility],            00:00:00                         [facility]



           Preserv and ABX                                             [facility]anch



           Free 6 MO-64 YRS                                             

 

           Influenza Virus            2022 Completed             [facility]

[facility]



           [facility],            00:00:00                         [facility]



           Preserv and ABX                                             Branch



           Free 6 MO-64 YRS                                             

 

           Influenza Virus            2022 Completed             [facility]



           [facility],            00:00:00                         [facility]



           Preserv and ABX                                             [facility]anch



           Free 6 MO-64 YRS                                             

 

           Influenza Virus            2022 Completed             [facility]



           [facility],            00:00:00                         [facility]



           Preserv and ABX                                             [facility]anch



           Free 6 MO-64 YRS                                             

 

           Influenza Virus            2022 Completed             [facility]

[facility]



           [facility],            00:00:00                         [facility]



           Preserv and ABX                                             [facility]anch



           Free 6 MO-64 YRS                                             

 

           Influenza Virus            2022 Completed             [facility]

[facility]



           [facility],            00:00:00                         [facility]



           Preserv and ABX                                             Branch



           Free 6 MO-64 YRS                                             

 

           Influenza Virus            2022 Completed             [facility]

[facility]



           [facility],            00:00:00                         [facility]



           Preserv and ABX                                             Branch



           Free 6 MO-64 YRS                                             

 

           Influenza Virus            2022 Completed             [facility]

[facility]



           [facility],            00:00:00                         [facility]



           Preserv and ABX                                             [facility]anch



           Free 6 MO-64 YRS                                             

 

           Influenza Virus            2022 Completed             [facility]

[facility]



           [facility],            00:00:00                         [facility]



           Preserv and ABX                                             Branch



           Free 6 MO-64 YRS                                             

 

           Influenza Virus            2022 Completed             [facility]

[facility]



           [facility],            00:00:00                         [facility]



           Preserv and ABX                                             Branch



           Free 6 MO-64 YRS                                             

 

           Influenza Virus            2022 Completed             [facility]

[facility]



           [facility],            00:00:00                         [facility]



           Preserv and ABX                                             Branch



           Free 6 MO-64 YRS                                             

 

           Influenza Virus            2022 Completed             [facility]

[facility]



           [facility],            00:00:00                         [facility]



           Preserv and ABX                                             Branch



           Free 6 MO-64 YRS                                             

 

           Influenza Virus            2022 Completed             [facility]

[facility]



           [facility],            00:00:00                         [facility]



           Preserv and ABX                                             Branch



           Free 6 MO-64 YRS                                             

 

           Influenza Virus            2022 Completed             [facility]

[facility]



           [facility],            00:00:00                         [facility]



           Preserv and ABX                                             Branch



           Free 6 MO-64 YRS                                             

 

           Influenza Virus            2022 Completed             [facility]

[facility]



           [facility],            00:00:00                         [facility]



           Preserv and ABX                                             Branch



           Free 6 MO-64 YRS                                             

 

           Influenza Virus            2022 Completed             [facility]

[facility]



           [facility],            00:00:00                         [facility]



           Preserv and ABX                                             Branch



           Free 6 MO-64 YRS                                             

 

           Influenza Virus            2022 Completed             [facility]

[facility]



           [facility],            00:00:00                         [facility]



           Preserv and ABX                                             Branch



           Free 6 MO-64 YRS                                             

 

           Influenza Virus            2022 Completed             [facility]

[facility]



           [facility],            00:00:00                         [facility]



           Preserv and ABX                                             Branch



           Free 6 MO-64 YRS                                             

 

           Influenza Virus            2022 Completed             [facility]

[facility]



           [facility],            00:00:00                         [facility]



           Preserv and ABX                                             Branch



           Free 6 MO-64 YRS                                             

 

           Influenza Virus            2022 Completed             [facility]

[facility]



           [facility],            00:00:00                         [facility]



           Preserv and ABX                                             Branch



           Free 6 MO-64 YRS                                             

 

           Influenza Virus            2022 Completed             [facility]

[facility]



           [facility],            00:00:00                         [facility]



           Preserv and ABX                                             Branch



           Free 6 MO-64 YRS                                             

 

           Influenza Virus            2022 Completed             [facility]

[facility]



           [facility],            00:00:00                         [facility]



           Preserv and ABX                                             Branch



           Free 6 MO-64 YRS                                             

 

           Influenza Virus            2022 Completed             [facility]

[facility]



           [facility],            00:00:00                         [facility]



           Preserv and ABX                                             Branch



           Free 6 MO-64 YRS                                             

 

           Influenza Virus            2022 Completed             [facility]

[facility]



           [facility],            00:00:00                         [facility]



           Preserv and ABX                                             Branch



           Free 6 MO-64 YRS                                             

 

           Influenza Virus            2022 Completed             [facility]

[facility]



           [facility],            00:00:00                         [facility]



           Preserv and ABX                                             Branch



           Free 6 MO-64 YRS                                             

 

           Influenza Virus            2022 Completed             [facility]

[facility]



           [facility],            00:00:00                         [facility]



           Preserv and ABX                                             Branch



           Free 6 MO-64 YRS                                             

 

           Influenza Virus            2022 Completed             [facility]

[facility]



           [facility],            00:00:00                         [facility]



           Preserv and ABX                                             Branch



           Free 6 MO-64 YRS                                             

 

           Influenza Virus            2022 Completed             [facility]

[facility]



           [facility],            00:00:00                         [facility]



           Preserv and ABX                                             Branch



           Free 6 MO-64 YRS                                             

 

           Influenza Virus            2022 Completed             [facility]

[facility]



           [facility],            00:00:00                         [facility]



           Preserv and ABX                                             Branch



           Free 6 MO-64 YRS                                             

 

           Influenza Virus            2022 Completed             [facility]

[facility]



           [facility],            00:00:00                         [facility]



           Preserv and ABX                                             Branch



           Free 6 MO-64 YRS                                             

 

           Influenza Virus            2022 Completed             [facility]

[facility]



           [facility],            00:00:00                         [facility]



           Preserv and ABX                                             Branch



           Free 6 MO-64 YRS                                             

 

           Influenza Virus            2022 Completed             [facility]

[facility]



           [facility],            00:00:00                         [facility]



           Preserv and ABX                                             Branch



           Free 6 MO-64 YRS                                             

 

           Influenza Virus            2022 Completed             [facility]

[facility]



           [facility],            00:00:00                         [facility]



           Preserv and ABX                                             Branch



           Free 6 MO-64 YRS                                             

 

           Influenza Virus            2022 Completed             [facility]

[facility]



           [facility],            00:00:00                         [facility]



           Preserv and ABX                                             Branch



           Free 6 MO-64 YRS                                             

 

           Influenza Virus            2022 Completed             [facility]

[facility]



           [facility],            00:00:00                         [facility]



           Preserv and ABX                                             Branch



           Free 6 MO-64 YRS                                             

 

           Influenza Virus            2022 Completed             [facility]

[facility] of



           [facility],            00:00:00                         [facility]



           Preserv and ABX                                             Branch



           Free 6 MO-64 YRS                                             

 

           Influenza Virus            2022 Completed             [facility]it

[facility] of



           [facility],            00:00:00                         [facility]



           Preserv and ABX                                             Branch



           Free 6 MO-64 YRS                                             

 

           Influenza Virus            2022 Completed             [facility]it

[facility] of



           [facility],            00:00:00                         [facility]



           Preserv and ABX                                             Branch



           Free 6 MO-64 YRS                                             

 

           Influenza Virus            2022 Completed             [facility]

[facility] of



           [facility],            00:00:00                         [facility]



           Preserv and ABX                                             Branch



           Free 6 MO-64 YRS                                             

 

           Influenza Virus            2022 Completed             [facility]

[facility] of



           [facility],            00:00:00                         [facility]



           Preserv and ABX                                             Branch



           Free 6 MO-64 YRS                                             

 

           Influenza Virus            2022 Completed             [facility]

[facility] of



           [facility],            00:00:00                         [facility]



           Preserv and ABX                                             Branch



           Free 6 MO-64 YRS                                             

 

           Influenza Virus            2022 Completed             [facility]

[facility] of



           [facility],            00:00:00                         [facility]



           Preserv and ABX                                             Branch



           Free 6 MO-64 YRS                                             

 

           Influenza Virus            2022 Completed             [facility]

[facility] of



           [facility],            00:00:00                         [facility]



           Preserv and ABX                                             Branch



           Free 6 MO-64 YRS                                             

 

           Influenza Virus            2022 Completed             [facility]

[facility] of



           [facility],            00:00:00                         [facility]



           Preserv and ABX                                             Branch



           Free 6 MO-64 YRS                                             

 

           Influenza Virus            2022 Completed             [facility]

[facility] of



           [facility],            00:00:00                         [facility]



           Preserv and ABX                                             Branch



           Free 6 MO-64 YRS                                             

 

           Influenza Virus            2022 Completed             [facility]

[facility] of



           [facility],            00:00:00                         [facility]



           Preserv and ABX                                             Branch



           Free 6 MO-64 YRS                                             

 

           Influenza Virus            2022 Completed             [facility]

[facility] of



           [facility],            00:00:00                         [facility]



           Preserv and ABX                                             Branch



           Free 6 MO-64 YRS                                             

 

           Influenza Virus            2022 Completed             [facility]

[facility] of



           [facility],            00:00:00                         [facility]



           Preserv and ABX                                             Branch



           Free 6 MO-64 YRS                                             

 

           Influenza Virus            2022 Completed             [facility]

[facility] of



           [facility],            00:00:00                         [facility]



           Preserv and ABX                                             Branch



           Free 6 MO-64 YRS                                             

 

           Influenza Virus            2022 Completed             [facility]

[facility] of



           [facility],            00:00:00                         [facility]



           Preserv and ABX                                             Branch



           Free 6 MO-64 YRS                                             

 

           Influenza Virus            2022 Completed             [facility]

[facility] of



           [facility],            00:00:00                         [facility]



           Preserv and ABX                                             Branch



           Free 6 MO-64 YRS                                             

 

           Influenza Virus            2022 Completed             [facility]

[facility] of



           [facility],            00:00:00                         [facility]



           Preserv and ABX                                             Branch



           Free 6 MO-64 YRS                                             

 

           Influenza Virus            2022 Completed             [facility]

[facility] of



           [facility],            00:00:00                         [facility]



           Preserv and ABX                                             Branch



           Free 6 MO-64 YRS                                             

 

           Influenza Virus            2022 Completed             [facility]

[facility] of



           [facility],            00:00:00                         [facility]



           Preserv and ABX                                             Branch



           Free 6 MO-64 YRS                                             

 

           Influenza Virus            2022 Completed             [facility]

[facility] of



           [facility],            00:00:00                         [facility]



           Preserv and ABX                                             Branch



           Free 6 MO-64 YRS                                             

 

           Influenza Virus            2022 Completed             [facility]

[facility] of



           [facility],            00:00:00                         [facility]



           Preserv and ABX                                             Branch



           Free 6 MO-64 YRS                                             

 

           Influenza Virus            2022 Completed             [facility]

[facility] of



           [facility],            00:00:00                         [facility]



           Preserv and ABX                                             Branch



           Free 6 MO-64 YRS                                             

 

           Influenza Virus            2022 Completed             [facility]it

[facility] of



           [facility],            00:00:00                         [facility]



           Preserv and ABX                                             Branch



           Free 6 MO-64 YRS                                             

 

           Influenza Virus            2022 Completed             [facility]it

[facility] of



           [facility],            00:00:00                         [facility]



           Preserv and ABX                                             Branch



           Free 6 MO-64 YRS                                             

 

           Influenza,            2020-10-28 Completed             



           injectable            00:00:00                         

 

           pneumococcal            -10-28 Completed             



           polysacchar            00:00:00                         

 

           zoster                -10- Completed             



                                 00:00:00                         

 

           Influenza,            2020-10-28 Completed             



           injectable            00:00:00                         

 

           pneumococcal            -10-28 Completed             



           polysacchar            00:00:00                         

 

           zoster                -10-28 Completed             



                                 00:00:00                         

 

           Influenza,            2020-10-28 Completed             



           injectable            00:00:00                         

 

           pneumococcal            -10-28 Completed             



           polysacchar            00:00:00                         

 

           zoster                -10-28 Completed             



                                 00:00:00                         

 

           Influenza,            2020-10-28 Completed             



           injectable            00:00:00                         

 

           pneumococcal            -10-28 Completed             



           polysacchar            00:00:00                         

 

           zoster                2020-10-28 Completed             



                                 00:00:00                         







Vital Signs







             Vital Name   Observation Time Observation Value Comments     Source

 

             Systolic blood 2023 19:26:00 115 mm[Hg]                [facility]

[facility]



             pressure                                            [facility]

 

             Diastolic blood 2023 19:26:00 81 mm[Hg]                 [facility]

[facility]

 

             Heart rate   2023 19:26:00 85 /min                   [facility]

[facility]

 

             Body temperature 2023 19:26:00 36.67 Krystal                 [facility]

 

             Respiratory rate 2023 19:26:00 18 /min                   [facility]

 

             Body height  2023 19:26:00 149.9 cm                  Univers[facility]

ty of



                                                                 [facility]



                                                                 Branch

 

             Body weight  2023 19:26:00 69.083 kg                 Univers[facility]

ty of



                                                                 [facility]



                                                                 Branch

 

             BMI          2023 19:26:00 30.76 kg/m2               [facility]

ty of



                                                                 [facility]



                                                                 Branch

 

             Oxygen saturation in 2023 19:26:00 100 /min                  

[facility]



             Arterial blood by                                        [facility]

[facility]



             Pulse oximetry                                        Branch

 

             Systolic blood 2023 15:55:00 117 mm[Hg]                [facility]

[facility] of



             pressure                                            [facility]



                                                                 Branch

 

             Diastolic blood 2023 15:55:00 73 mm[Hg]                 [facility]

rs[facility] of



             pressure                                            [facility]



                                                                 Branch

 

             Heart rate   2023 15:55:00 63 /min                   [facility]

[facility] of



                                                                 [facility]



                                                                 Branch

 

             Body height  2023 15:55:00 149.9 cm                  [facility]

[facility] of



                                                                 [facility]



                                                                 Branch

 

             Body weight  2023 15:55:00 66.225 kg                 [facility]

ty of



                                                                 [facility]



                                                                 Branch

 

             BMI          2023 15:55:00 29.49 kg/m2               [facility]

ty of



                                                                 [facility]



                                                                 Branch

 

             Oxygen saturation in 2023 15:55:00 98 /min                   

[facility]



             Arterial blood by                                        [facility]

[facility]



             Pulse oximetry                                        Branch

 

             Systolic blood 2023 21:24:00 127 mm[Hg]                [facility]

[facility] of



             pressure                                            [facility]



                                                                 Branch

 

             Diastolic blood 2023 21:24:00 66 mm[Hg]                 [facility]

rs[facility] of



             pressure                                            [facility]



                                                                 Branch

 

             Heart rate   2023 21:24:00 81 /min                   [facility]

ty of



                                                                 [facility]



                                                                 Branch

 

             Body temperature 2023 21:24:00 37 Krystal                    [facility]

ers[facility] of



                                                                 [facility]



                                                                 Branch

 

             Body height  2023 21:24:00 147.3 cm                  [facility]

[facility] of



                                                                 [facility]



                                                                 Branch

 

             Body weight  2023 21:24:00 67.994 kg                 [facility]

ty of



                                                                 [facility]



                                                                 Branch

 

             BMI          2023 21:24:00 31.33 kg/m2               [facility]

ty of



                                                                 [facility]



                                                                 Branch

 

             Oxygen saturation in 2023 21:24:00 97 /min                   

[facility]



             Arterial blood by                                        [facility]

[facility]



             Pulse oximetry                                        Branch

 

             Systolic blood 2022 16:45:00 131 mm[Hg]                [facility]

sity of



             pressure                                            [facility]



                                                                 Branch

 

             Diastolic blood 2022 16:45:00 68 mm[Hg]                 [facility]

rsity of



             pressure                                            [facility]



                                                                 Branch

 

             Heart rate   2022 16:45:00 88 /min                   [facility]

[facility] of



                                                                 [facility]



                                                                 Branch

 

             Respiratory rate 2022 16:45:00 15 /min                   [facility]iv

ersity of



                                                                 [facility]



                                                                 Branch

 

             Oxygen saturation in 2022 16:45:00 96 /min                   

[facility]



             Arterial blood by                                        [facility]

[facility]



             Pulse oximetry                                        Branch

 

             Body height  2022 12:58:00 149.9 cm                  [facility]

[facility] of



                                                                 [facility]



                                                                 Branch

 

             Body weight  2022 12:58:00 65.772 kg                 [facility]

ty of



                                                                 [facility]



                                                                 Branch

 

             BMI          2022 12:58:00 29.29 kg/m2               Univers[facility]

ty of



                                                                 [facility]



                                                                 Branch

 

             Systolic blood 2022 16:30:00 143 mm[Hg]                [facility]

sity of



             pressure                                            [facility]



                                                                 Branch

 

             Diastolic blood 2022 16:30:00 74 mm[Hg]                 [facility]

rsity of



             pressure                                            [facility]



                                                                 Branch

 

             Heart rate   2022 16:30:00 85 /min                   [facility]

[facility] of



                                                                 [facility]



                                                                 Branch

 

             Respiratory rate 2022 16:30:00 17 /min                   [facility]

ers[facility] of



                                                                 [facility]



                                                                 Branch

 

             Oxygen saturation in 2022 16:30:00 97 /min                   

[facility]



             Arterial blood by                                        [facility]

[facility]



             Pulse oximetry                                        Branch

 

             Body height  2022 12:58:00 149.9 cm                  [facility]

[facility] of



                                                                 [facility]



                                                                 Branch

 

             Body weight  2022 12:58:00 65.772 kg                 [facility]

ty of



                                                                 [facility]



                                                                 Branch

 

             BMI          2022 12:58:00 29.29 kg/m2               [facility]

ty of



                                                                 [facility]



                                                                 Branch

 

             Systolic blood 2022 14:55:00 113 mm[Hg]                [facility]

sity of



             pressure                                            [facility]



                                                                 Branch

 

             Diastolic blood 2022 14:55:00 66 mm[Hg]                 [facility]

rs[facility] of



             pressure                                            [facility]



                                                                 Branch

 

             Heart rate   2022 14:55:00 86 /min                   [facility]

[facility] of



                                                                 [facility]



                                                                 Branch

 

             Body temperature 2022 14:55:00 36.89 Krystal                 Univ

ersity of



                                                                 [facility]



                                                                 Branch

 

             Body height  2022 14:55:00 149.9 cm                  [facility]

[facility] of



                                                                 [facility]



                                                                 Branch

 

             Body weight  2022 14:55:00 65.363 kg                 [facility]

ty of



                                                                 [facility]



                                                                 Branch

 

             BMI          2022 14:55:00 29.10 kg/m2               [facility]

ty of



                                                                 [facility]



                                                                 Branch

 

             Oxygen saturation in 2022 14:55:00 98 /min                   

[facility]



             Arterial blood by                                        [facility]

[facility]



             Pulse oximetry                                        Branch

 

             Systolic blood 2022-10-21 13:56:00 123 mm[Hg]                [facility]

s[facility] of



             pressure                                            [facility]



                                                                 Branch

 

             Diastolic blood 2022-10-21 13:56:00 67 mm[Hg]                 [facility]

rsity of



             pressure                                            [facility]



                                                                 Branch

 

             Heart rate   2022-10-21 13:56:00 69 /min                   [facility]

[facility] of



                                                                 [facility]



                                                                 Branch

 

             Respiratory rate 2022-10-21 13:56:00 19 /min                   [facility]

ersity of



                                                                 [facility]



                                                                 Branch

 

             Body height  2022-10-21 13:56:00 149.9 cm                  [facility]

[facility] of



                                                                 [facility]



                                                                 Branch

 

             Body weight  2022-10-21 13:56:00 63.05 kg                  [facility]

ty of



                                                                 [facility]



                                                                 Branch

 

             BMI          2022-10-21 13:56:00 28.07 kg/m2               [facility]

ty of



                                                                 [facility]



                                                                 Branch

 

             Oxygen saturation in 2022-10-21 13:56:00 98 /min                   

[facility]



             Arterial blood by                                        [facility]

[facility]



             Pulse oximetry                                        Branch

 

             Systolic blood 2022 14:28:00 137 mm[Hg]                [facility]

sity of



             pressure                                            [facility]



                                                                 Branch

 

             Diastolic blood 2022 14:28:00 70 mm[Hg]                 [facility]

rsity of



             pressure                                            [facility]



                                                                 Branch

 

             Heart rate   2022 14:28:00 60 /min                   [facility]

ty of



                                                                 [facility]



                                                                 Branch

 

             Respiratory rate 2022 14:28:00 19 /min                   [facility]

[facility]



                                                                 [facility]



                                                                 Branch

 

             Body height  2022 14:28:00 149.9 cm                  [facility]

[facility]



                                                                 [facility]



                                                                 Branch

 

             Body weight  2022 14:28:00 62.279 kg                 [facility]

[facility]



                                                                 [facility]



                                                                 Branch

 

             BMI          2022 14:28:00 27.73 kg/m2               [facility]

[facility] of



                                                                 [facility]



                                                                 Branch

 

             Oxygen saturation in 2022 14:28:00 96 /min                   

[facility]



             Arterial blood by                                        [facility]

[facility]



             Pulse oximetry                                        Branch

 

             Systolic blood 2022 07:25:00 105 mm[Hg]                [facility]

[facility]



             pressure                                            [facility]



                                                                 Branch

 

             Diastolic blood 2022 07:25:00 53 mm[Hg]                 [facility]

[facility] of



             pressure                                            [facility]



                                                                 Branch

 

             Heart rate   2022 07:25:00 112 /min                  [facility]

[facility] of



                                                                 [facility]



                                                                 Branch

 

             Respiratory rate 2022 07:25:00 18 /min                   [facility]

[facility]



                                                                 [facility]



                                                                 Branch

 

             Oxygen saturation in 2022 07:25:00 96 /min                   

[facility]



             Arterial blood by                                        [facility]

[facility]



             Pulse oximetry                                        Branch

 

             Body temperature 2022 03:32:00 36.89 Krystal                 [facility]

[facility]



                                                                 [facility]



                                                                 Branch

 

             Body height  2022 03:32:00 149.9 cm                  [facility]

[facility] of



                                                                 [facility]



                                                                 Branch

 

             Body weight  2022 03:32:00 53.978 kg                 [facility]

[facility] of



                                                                 [facility]



                                                                 Branch

 

             BMI          2022 03:32:00 24.04 kg/m2               [facility]

[facility] of



                                                                 [facility]



                                                                 [facility]anch

 

             Systolic blood 2022 13:27:00 134 mm[Hg]                [facility]

[facility]



             pressure                                            [facility]



                                                                 Branch

 

             Diastolic blood 2022 13:27:00 78 mm[Hg]                 [facility]

[facility]



             pressure                                            [facility]



                                                                 Branch

 

             Heart rate   2022 13:27:00 98 /min                   [facility]

[facility]



                                                                 [facility]



                                                                 [facility]anch

 

             Body temperature 2022 13:27:00 36.56 Krystal                 [facility]

[facility]



                                                                 [facility]



                                                                 [facility]anch

 

             Respiratory rate 2022 13:27:00 19 /min                   [facility]

[facility]



                                                                 [facility]



                                                                 [facility]anch

 

             Body height  2022 13:27:00 149.9 cm                  [facility]

[facility]



                                                                 [facility]



                                                                 [facility]anch

 

             Body weight  2022 13:27:00 53.706 kg                 [facility]

[facility] of



                                                                 [facility]



                                                                 [facility]anch

 

             BMI          2022 13:27:00 23.91 kg/m2               [facility]

[facility] of



                                                                 [facility]



                                                                 Branch

 

             Oxygen saturation in 2022 13:27:00 97 /min                   

[facility]



             Arterial blood by                                        [facility]

[facility]



             Pulse oximetry                                        Branch

 

             BP Systolic  2022 14:18:00 138 mm[Hg]                

 

             BP Diastolic 2022 14:18:00 81 mm[Hg]                 

 

             Weight Measured 2022 14:18:00 147.00 pounds              

 

             Height Measured 2022 14:18:00 59.00 inches              

 

             Body Temperature 2022 14:18:00 99.20 degrees              

 

             Heart Rate   2022 14:18:00 90.00 /min                

 

             Respiratory Rate 2022 14:18:00 17.00 /min                

 

             BP Systolic  2022 16:44:00 128 mm[Hg]                

 

             BP Diastolic 2022 16:44:00 65 mm[Hg]                 

 

             Weight Measured 2022 16:44:00 130.60 pounds              

 

             Height Measured 2022 16:44:00 59.00 inches              

 

             Body Temperature 2022 16:44:00 98.60 degrees              

 

             Heart Rate   2022 16:44:00 85.00 /min                

 

             Respiratory Rate 2022 16:44:00                           

 

             BP Systolic  2022 14:47:00 120 mm[Hg]                

 

             BP Diastolic 2022 14:47:00 63 mm[Hg]                 

 

             Weight Measured 2022 14:47:00 130.60 pounds              

 

             Height Measured 2022 14:47:00 59.00 inches              

 

             Body Temperature 2022 14:47:00 98.30 degrees              

 

             Heart Rate   2022 14:47:00 89.00 /min                

 

             Respiratory Rate 2022 14:47:00                           

 

             BP Systolic  2022 19:00:00                           

 

             BP Diastolic 2022 19:00:00                           

 

             Weight Measured 2022 19:00:00 125.00 pounds              

 

             Height Measured 2022 19:00:00 59.00 inches              

 

             Body Temperature 2022 19:00:00                           

 

             Heart Rate   2022 19:00:00                           

 

             Respiratory Rate 2022 19:00:00                           

 

             BP Systolic  2022 14:00:00 122 mm[Hg]                

 

             BP Diastolic 2022 14:00:00 74 mm[Hg]                 

 

             Weight Measured 2022 14:00:00 125.40 pounds              

 

             Height Measured 2022 14:00:00 59.00 inches              

 

             Body Temperature 2022 14:00:00 98.40 degrees              

 

             Heart Rate   2022 14:00:00 96.00 /min                

 

             Respiratory Rate 2022 14:00:00                           

 

             BP Systolic  2021 11:31:00 125.4 mm[Hg]              

 

             BP Diastolic 2021 11:31:00 74 mm[Hg]                 

 

             Weight Measured 2021 11:31:00 125.40 pounds              

 

             Height Measured 2021 11:31:00 59.00 inches              

 

             Body Temperature 2021 11:31:00 97.80 degrees              

 

             Heart Rate   2021 11:31:00 83.00 /min                

 

             Respiratory Rate 2021 11:31:00 17.00 /min                

 

             BP Systolic  2021 10:18:00 156 mm[Hg]                

 

             BP Diastolic 2021 10:18:00 83 mm[Hg]                 

 

             Weight Measured 2021 10:18:00 133.00 pounds              

 

             Height Measured 2021 10:18:00 59.00 inches              

 

             Body Temperature 2021 10:18:00                           

 

             Heart Rate   2021 10:18:00 106.00 /min               

 

             Respiratory Rate 2021 10:18:00 17.00 /min                

 

             BP Systolic  2021 09:45:00 164 mm[Hg]                

 

             BP Diastolic 2021 09:45:00 78 mm[Hg]                 

 

             Weight Measured 2021 09:45:00 145.00 pounds              

 

             Height Measured 2021 09:45:00 59.00 inches              

 

             Body Temperature 2021 09:45:00 98.20 degrees              

 

             Heart Rate   2021 09:45:00 94.00 /min                

 

             Respiratory Rate 2021 09:45:00                           

 

             BP Systolic  2021 08:08:00                           

 

             BP Diastolic 2021 08:08:00                           

 

             Weight Measured 2021 08:08:00 160.00 pounds              

 

             Height Measured 2021 08:08:00 59.00 inches              

 

             Body Temperature 2021 08:08:00                           

 

             Heart Rate   2021 08:08:00                           

 

             Respiratory Rate 2021 08:08:00                           

 

             BP Systolic  2021 15:25:00 109 mm[Hg]                

 

             BP Diastolic 2021 15:25:00 67 mm[Hg]                 

 

             Weight Measured 2021 15:25:00 166.40 pounds              

 

             Height Measured 2021 15:25:00 59.00 inches              

 

             Body Temperature 2021 15:25:00 98.20 degrees              

 

             Heart Rate   2021 15:25:00 94.00 /min                

 

             Respiratory Rate 2021 15:25:00 17.00 /min                

 

             BP Systolic  2021 14:11:00 128 mm[Hg]                

 

             BP Diastolic 2021 14:11:00 76 mm[Hg]                 

 

             Weight Measured 2021 14:11:00 167.20 pounds              

 

             Height Measured 2021 14:11:00 59.00 inches              

 

             Body Temperature 2021 14:11:00 98.40 degrees              

 

             Heart Rate   2021 14:11:00 103.00 /min               

 

             Respiratory Rate 2021 14:11:00 18.00 /min                

 

             BP Systolic  2021 17:02:00 127 mm[Hg]                

 

             BP Diastolic 2021 17:02:00 80 mm[Hg]                 

 

             Weight Measured 2021 17:02:00 173.40 pounds              

 

             Height Measured 2021 17:02:00 59.00 inches              

 

             Body Temperature 2021 17:02:00 98.60 degrees              

 

             Heart Rate   2021 17:02:00 90.00 /min                

 

             Respiratory Rate 2021 17:02:00 17.00 /min                







Procedures







                Procedure       Date / Time     Performing Clinician Source



                                Performed                       

 

                [facility] PATIENT FINANCIAL 2023 18:08:21 [facility]



                POLICY                          [place]         [facility]

 

                SLEEP STUDY DATA REPORT 2023 06:01:00 Doctor Unassigned, [facility]



                                                [place]         [facility]

 

                SLEEP LAB RESULTS 2023 06:01:00 Damon Saldivar    [facility]

 

                CARDIAC CATHETERIZATION 2022 14:21:00 Stephanie Burton 

[facility]

 

                CARDIAC CATHETERIZATION 2022 14:21:00 Stephanie Burton. 

[facility]

 

                CARDIAC CATHETERIZATION 2022 14:21:00 Stephanie Burton. 

[facility]

 

                CARDIAC CATHETERIZATION 2022 14:21:00 Stephanie Burton. 

[facility]

 

                CARDIAC CATHETERIZATION 2022 14:21:00 Stephanie Burton. 

[facility]

 

                CARDIAC CATHETERIZATION 2022 14:21:00 BurtonStephanie thornton. 

[facility]

 

                CARDIAC CATHETERIZATION 2022 14:21:00 Stephanie Burton. 

[facility]

 

                CARDIAC CATHETERIZATION 2022 14:21:00 Stephanie Burton. 

[facility]

 

                CATH PROCEDURE LOG 2022 13:52:57 Stephanie Burton [facility]

 

                [facility]ED[facility]22 [facility]:52[facility]57 [facility]

[facility]

 

                OUTPATIENT CARDIAC 2022 06:01:00 Doctor Unassigned, [facility]



                CATHETERIZATION DOCUMENTS                 No Name         Medica

l Branch

 

                PULMONARY FUNCTION TEST 2022-10-14 13:12:39 [facility]



                (RESULTS)                                       Medical Branch

 

                FLU VACC ([id number]), 6 2022 14:32:13 [facility]



                MO-64 YRS, .5ML, IM, QUAD                                 Medica

l Branch



                (FLUCELVAX)                                     

 

                ASSIGNMENT OF BENEFITS 2022 14:09:37 Doctor Unassigned, [facility]



                                                [place]         [facility]

 

                NUCLEAR STRESS TEST 2022 16:10:00 Micheal, [facility]



                CARDIOLOGY (DO NOT SCHED)                                 Med[facility]

l Branch

 

                NUCLEAR STRESS TEST 2022 16:10:00 Burton, Sendil [facility]



                CARDIOLOGY (DO NOT SCHED)                                 [facility]

l Branch

 

                NUCLEAR STRESS TEST 2022 16:10:00 Burton, Sendil [facility]



                CARDIOLOGY (DO NOT SCHED)                                 Medic

l Branch

 

                NUCLEAR STRESS TEST 2022 16:10:00 Stephanie Burton [facility]



                CARDIOLOGY (DO NOT SCHED)                                 [facility]

l Branch

 

                CT ABDOMEN PELVIS WO 2022 05:22:00 Agnes Mack [facility]



                CONTRAST                                        Medical Branch

 

                LIPASE          2022 03:51:00 Agnes Mack [facility]

 

                COMP. METABOLIC PANEL 2022 03:51:00 Agnes Mack [facility]



                ([id number])                                         [facility]

 

                CBC WITH DIFF   2022 03:51:00 Agnes Mack [facility]

 

                URINALYSIS      2022 03:51:00 Agnes Mack [facility]

 

                CONSENT/REFUSAL FOR 2022 03:23:55 Doctor Unassigned, [facility]



                DIAGNOSIS AND TREATMENT                 [place]         [facility]

 

                NM RENAL FLOW FUNCTION 2022 18:14:00 Tito Cohen  [facility]



                WITH PHARMACOLOGICAL                                 [facility]

[facility]



                INTERVENTION                                    

 

                POCT URINALYSIS AUTO 2022 13:28:00 Tito Cohen  [facility]

 

                DISCLOSURE AND CONSENT, 2022 05:01:00 Doctor Unassigned, U

[facility]



                MEDICAL AND SURGICAL                 No Name         [facility]

nc



                PROCEDURES                                      







Plan of Care







             Planned Activity Planned Date Details      Comments     Source

 

             Goal                      Plan of Care Note [code = 07119-5]       

       

 

             Goal                      Plan of Care Note [code = 08325-7]       

       

 

             Goal                      Plan of Care Note [code = 42154-9]       

       

 

             Goal                      Plan of Care Note [code = 48596-4]       

       

 

             Goal                      Plan of Care Note [code = 82773-8]       

       

 

             Goal                      Plan of Care Note [code = 45455-4]       

       

 

             Goal                      Plan of Care Note [code = 97381-4]       

       

 

             Goal                      Plan of Care Note [code = 76165-8]       

       

 

             Goal                      Plan of Care Note [code = 68948-9]       

       

 

             Goal                      Plan of Care Note [code = 72949-3]       

       

 

             Goal                      Plan of Care Note [code = 59683-8]       

       

 

             Goal                      Plan of Care Note [code = 00042-5]       

       

 

             Goal                      Plan of Care Note [code = 89732-4]       

       

 

             Goal                      Plan of Care Note [code = 86665-3]       

       

 

             Goal                      Plan of Care Note [code = 25153-9]       

       

 

             Goal                      Plan of Care Note [code = 46583-4]       

       

 

             Goal                      Plan of Care Note [code = 06591-4]       

       

 

             Goal                      Plan of Care Note [code = 13132-8]       

       

 

             Goal                      Plan of Care Note [code = 57473-7]       

       

 

             Goal                      Plan of Care Note [code = 15716-5]       

       

 

             Goal                      Plan of Care Note [code = 75420-3]       

       

 

             Goal                      Plan of Care Note [code = 56607-2]       

       

 

             Goal                      Plan of Care Note [code = 02178-9]       

       

 

             Goal                      Plan of Care Note [code = 64551-6]       

       

 

             Goal                      Plan of Care Note [code = 19954-3]       

       

 

             Goal                      Plan of Care Note [code = 53395-0]       

       

 

             Goal                      Plan of Care Note [code = 68567-1]       

       

 

             Goal                      Plan of Care Note [code = 01191-2]       

       

 

             Goal                      Plan of Care Note [code = 46515-3]       

       

 

             Goal                      Plan of Care Note [code = 36101-3]       

       

 

             Goal                      Plan of Care Note [code = 16406-8]       

       

 

             Goal                      Plan of Care Note [code = 44219-8]       

       

 

             Goal                      Plan of Care Note [code = 43493-6]       

       

 

             Goal                      Plan of Care Note [code = 46768-1]       

       

 

             Goal                      Plan of Care Note [code = 93027-0]       

       

 

             Goal                      Plan of Care Note [code = 54998-4]       

       

 

             Goal                      Plan of Care Note [code = 84131-6]       

       

 

             Goal                      Plan of Care Note [code = 48795-8]       

       

 

             Goal                      Plan of Care Note [code = 06538-9]       

       

 

             Goal                      Plan of Care Note [code = 29542-1]       

       

 

             Goal                      Plan of Care Note [code = 75544-7]       

       

 

             Goal                      Plan of Care Note [code = 52637-5]       

       

 

             Goal                      Plan of Care Note [code = 68446-5]       

       

 

             Goal                      Plan of Care Note [code = 99125-0]       

       

 

             Goal                      Plan of Care Note [code = 52516-2]       

       

 

             Goal                      Plan of Care Note [code = 52163-2]       

       

 

             Goal                      Plan of Care Note [code = 50995-6]       

       

 

             Goal                      Plan of Care Note [code = 70626-7]       

       

 

             Goal                      Plan of Care Note [code = 94447-7]       

       

 

             Goal                      Plan of Care Note [code = 24821-5]       

       

 

             Goal                      Plan of Care Note [code = 32804-5]       

       

 

             Goal                      Plan of Care Note [code = 37638-3]       

       

 

             Goal                      Plan of Care Note [code = 81544-8]       

       

 

             Goal                      Plan of Care Note [code = 16399-5]       

       

 

             Goal                      Plan of Care Note [code = 10858-2]       

       

 

             Goal                      Plan of Care Note [code = 84541-2]       

       

 

             Goal                      Plan of Care Note [code = 38311-3]       

       

 

             Goal                      Plan of Care Note [code = 42150-7]       

       

 

             Goal                      Plan of Care Note [code = 72818-7]       

       

 

             Goal                      Plan of Care Note [code = 66883-2]       

       

 

             Goal                      Plan of Care Note [code = 40511-0]       

       

 

             Goal                      Plan of Care Note [code = 82954-1]       

       

 

             Goal                      Plan of Care Note [code = 41401-9]       

       

 

             Goal                      Plan of Care Note [code = 99852-1]       

       

 

             Goal                      Plan of Care Note [code = 26851-9]       

       

 

             Goal                      Plan of Care Note [code = 61408-6]       

       

 

             Goal                      Plan of Care Note [code = 41850-3]       

       

 

             Goal                      Plan of Care Note [code = 67564-2]       

       

 

             Goal                      Plan of Care Note [code = 91035-7]       

       

 

             Goal                      Plan of Care Note [code = 08632-0]       

       

 

             Goal                      Plan of Care Note [code = 21719-5]       

       

 

             Goal                      Plan of Care Note [code = 01997-3]       

       

 

             Goal                      Plan of Care Note [code = 35085-1]       

       

 

             Goal                      Plan of Care Note [code = 41930-3]       

       

 

             Goal                      Plan of Care Note [code = 63600-0]       

       

 

             Goal                      Plan of Care Note [code = 91978-8]       

       

 

             Goal                      Plan of Care Note [code = 86812-8]       

       

 

             Goal                      Plan of Care Note [code = 81494-8]       

       

 

             Goal                      Plan of Care Note [code = 50116-6]       

       

 

             Goal                      Plan of Care Note [code = 31841-0]       

       

 

             Goal                      Plan of Care Note [code = 40747-0]       

       

 

             Goal                      Plan of Care Note [code = 80977-4]       

       

 

             Goal                      Plan of Care Note [code = 70281-8]       

       

 

             Goal                      Plan of Care Note [code = 62474-0]       

       

 

             Goal                      Plan of Care Note [code = 79102-2]       

       

 

             Goal                      Plan of Care Note [code = 07803-9]       

       

 

             Goal                      Plan of Care Note [code = 84735-0]       

       

 

             Goal                      Plan of Care Note [code = 91161-7]       

       

 

             Goal                      Plan of Care Note [code = 71270-4]       

       

 

             Goal                      Plan of Care Note [code = 06693-4]       

       

 

             Goal                      Plan of Care Note [code = 31129-0]       

       

 

             Goal                      Plan of Care Note [code = 85660-3]       

       

 

             Goal                      Plan of Care Note [code = 42412-6]       

       

 

             Goal                      Plan of Care Note [code = 80276-5]       

       

 

             Goal                      Plan of Care Note [code = 34628-7]       

       

 

             Goal                      Plan of Care Note [code = 42362-2]       

       

 

             Goal                      Plan of Care Note [code = 56110-4]       

       

 

             Goal                      Plan of Care Note [code = 16400-2]       

       

 

             Goal                      Plan of Care Note [code = 50169-3]       

       

 

             Goal                      Plan of Care Note [code = 22914-7]       

       

 

             Goal                      Plan of Care Note [code = 05805-8]       

       

 

             Goal                      Plan of Care Note [code = 55779-7]       

       

 

             Goal                      Plan of Care Note [code = 40778-6]       

       

 

             Goal                      Plan of Care Note [code = 54716-4]       

       

 

             Goal                      Plan of Care Note [code = 07300-3]       

       

 

             Goal                      Plan of Care Note [code = 03889-0]       

       

 

             Goal                      Plan of Care Note [code = 44163-7]       

       

 

             Goal                      Plan of Care Note [code = 88051-0]       

       

 

             Goal                      Plan of Care Note [code = 65609-7]       

       

 

             Goal                      Plan of Care Note [code = 27321-2]       

       

 

             Goal                      Plan of Care Note [code = 68624-2]       

       

 

             Goal                      Plan of Care Note [code = 12680-7]       

       

 

             Goal                      Plan of Care Note [code = 71588-8]       

       

 

             Goal                      Plan of Care Note [code = 13352-4]       

       

 

             Goal                      Plan of Care Note [code = 64922-4]       

       

 

             Goal                      Plan of Care Note [code = 50631-8]       

       

 

             Goal                      Plan of Care Note [code = 29919-6]       

       

 

             Goal                      Plan of Care Note [code = 09990-4]       

       

 

             Goal                      Plan of Care Note [code = 97976-0]       

       

 

             Goal                      Plan of Care Note [code = 34510-4]       

       

 

             Goal                      Plan of Care Note [code = 28486-9]       

       

 

             Goal                      Plan of Care Note [code = 71088-7]       

       

 

             Goal                      Plan of Care Note [code = 60662-4]       

       

 

             Goal                      Plan of Care Note [code = 36563-2]       

       

 

             Goal                      Plan of Care Note [code = 86643-9]       

       

 

             Goal                      Plan of Care Note [code = 67031-4]       

       

 

             Goal                      Plan of Care Note [code = 73872-8]       

       

 

             Goal                      Plan of Care Note [code = 55419-5]       

       

 

             Goal                      Plan of Care Note [code = 61362-4]       

       

 

             Goal                      Plan of Care Note [code = 79347-2]       

       

 

             Goal                      Plan of Care Note [code = 88099-8]       

       

 

             Goal                      Plan of Care Note [code = 31735-4]       

       

 

             Goal                      Plan of Care Note [code = 16538-8]       

       

 

             Goal                      Plan of Care Note [code = 34331-2]       

       

 

             Goal                      Plan of Care Note [code = 52147-1]       

       

 

             Goal                      Plan of Care Note [code = 92807-5]       

       

 

             Goal                      Plan of Care Note [code = 25430-4]       

       

 

             Goal                      Plan of Care Note [code = 25502-5]       

       

 

             Goal                      Plan of Care Note [code = 46510-5]       

       

 

             Goal                      Plan of Care Note [code = 37998-1]       

       

 

             Goal                      Plan of Care Note [code = 29402-4]       

       

 

             Goal                      Plan of Care Note [code = 86046-5]       

       

 

             Goal                      Plan of Care Note [code = 00238-7]       

       

 

             Goal                      Plan of Care Note [code = 98239-0]       

       

 

             Goal                      Plan of Care Note [code = 62148-8]       

       

 

             Goal                      Plan of Care Note [code = 19265-6]       

       

 

             Goal                      Plan of Care Note [code = 50444-1]       

       

 

             Goal                      Plan of Care Note [code = 25393-2]       

       

 

             Goal                      Plan of Care Note [code = 49986-7]       

       

 

             Goal                      Plan of Care Note [code = 76964-2]       

       

 

             Goal                      Plan of Care Note [code = 00800-1]       

       

 

             Goal                      Plan of Care Note [code = 85626-9]       

       

 

             Goal                      Plan of Care Note [code = 85342-1]       

       

 

             Goal                      Plan of Care Note [code = 28767-3]       

       

 

             Goal                      Plan of Care Note [code = 18370-1]       

       

 

             Goal                      Plan of Care Note [code = 20445-7]       

       

 

             Goal                      Plan of Care Note [code = 67210-5]       

       

 

             Goal                      Plan of Care Note [code = 77401-7]       

       

 

             Goal                      Plan of Care Note [code = 67861-5]       

       

 

             Goal                      Plan of Care Note [code = 32964-1]       

       

 

             Goal                      Plan of Care Note [code = 44208-0]       

       

 

             Goal                      Plan of Care Note [code = 51728-2]       

       

 

             Goal                      Plan of Care Note [code = 33040-7]       

       

 

             Goal                      Plan of Care Note [code = 46974-2]       

       

 

             Goal                      Plan of Care Note [code = 39554-0]       

       

 

             Goal                      Plan of Care Note [code = 10700-3]       

       

 

             Goal                      Plan of Care Note [code = 30574-1]       

       

 

             Goal                      Plan of Care Note [code = 95498-5]       

       

 

             Goal                      Plan of Care Note [code = 62934-9]       

       

 

             Goal                      Plan of Care Note [code = 53852-2]       

       

 

             Goal                      Plan of Care Note [code = 35301-7]       

       

 

             Goal                      Plan of Care Note [code = 38452-4]       

       







Encounters







        Start   End     Encounter Admission Attending Care    Care    Encounter 

Source



        Date/Time Date/Time Type    Type    Clinicians Facility Department ID   

   

 

        2023 Outpatient R       MICHEAL [facility]    [id number]

734 Univers



        14:00:00 14:00:00                 SENDIL                          ity of



                                                                        [facility]

 

        2023 Refill          Micheal [facility]    1.2.840.114 [id number]

054 Univers



        00:00:00 00:00:00                 Sendil GLORIA .1.13.10      

   ity of



                                                [place] 4.2.7.2.686         [place]

[place]



                                                PROFESS[place] [phone]         Me

mumtaz DE LA O     059             [facility]                 

 

        2023 Patient         Doctor  [facility]    1.2.840.114 [id number]

801 Univers



        00:00:00 00:00:00 Secure Msg         UnassPHU riddle 350.1.13.10   

      ity of



                                        [place] 4.2.7.2.686         [place]

[place]



                                                PROFESS[place] [phone]         Me

dical



                                                NAL     085             [facility]                 

 

        2023 Telephone         Mike   [facility]    [id number]

[id number] Univers



        00:00:00 00:00:00                 Damon .1.13.10         i

ty of



                                                [place] 4.2.7.2.686         [place]

[place]



                                                PROFESSIO [phone]         Me

dical



                                                NAL     [address] [place] [address]                 

 

        2023 Outpatient R       DAMON SALDIVAR [facility]    10

[id number] Univers



        10:00:00 10:00:00                 DAMON SALDIVAR                         i

ty of



                                                                        [facility]

 

        2023-05-10 2023-05-10 Telephone         Micheal [facility]    [id number]

[id number] Univers



        00:00:00 00:00:00                 Sendil [facility] 350.1.13.10      

   ity of



                                                [facility] 4.2.7.2.686         [place]

[place]



                                                PROFESSIO [phone]         Me

dical



                                                NAL     059             [facility]                 

 

        2023 Telephone         Micheal [facility]    [id number]

[id number] Univers



        00:00:00 00:00:00                 Stephanie .1.13.10      

   ity of



                                                [facility] 4.2.7.2.686         [place]

[place]



                                                PROFESSIO [phone]         Me

dical



                                                NAL     059             [facility]                 

 

        2023 Technician         2, Adc Lab [facility]    1.2.840.114 

[id number] Univers



        15:00:00 15:15:00 Visit           Stephanie Burton 350.1.13.

10         ity of



                                                [facility] 4.2.7.2.686         [place]

[place]



                                                PROFESSIO [phone]         [facility]     353             [facility]                 

 

        [facility]    [id number]

183 Univers



        14:00:00 14:47:05                 [facility]

 

        [facility]23 [facility]          [facility]    1.2.840.114 [id number]

37 Univers



        14:00:00 14:47:05 Visit           Stephanie .1.13.10      

   ity of



                                                [facility] 4.2.7.2.686         [place]

[place]



                                                PROFESSIO [phone]         Me

[place] [id number]             [facility]                 

 

        2023 Orders          Doctor  DONTAE    1.2.840.114 [id number]

766 Univers



        00:00:00 00:00:00 Only            Unassigned, JESSICA   350.1.13.10       

  ity of



                                        [facility] 4.2.7.2.686         [place]

as



                                                        [phone]         [address]

 

        2023 Outpatient R       ELIZABETH HAWKINS [facility] 

   [id number] 

Univers



        20:00:00 20:00:00                 ELIZABETH HAWKINS                     

    ity [facility]

 

        2023 Outpatient                 [facility]     [id number]

023 Mikel



        15:52:32 15:52:32                                         0308    F



                                                                        Xavi

 

        2023 Telephone         Mike   [facility]    1.2.600.056 [id number]

[id number] Univers



        00:00:00 00:00:00                 Shiwan  [place] 350.1.13.10         i

ty of



                                                [place] 4.2.7.2.686         [place]

[place]



                                                PROFESSIO [phone]         Me

dical



                                                NAL     [address] [place] [address]                 

 

        2023 Telephone         [facility]    [id number] [id number]

[id number] Univers



        00:00:00 00:00:00                 Shiwan  [place] 350.1.13.10         i

ty of



                                                [place] 4.2.7.2.686         [place]

[place]



                                                PROFESSIO [phone]         Me

dical



                                                NAL     [address] [place] [address]                 

 

        2023-02-15 2023-02-15 Outpatient                 [facility]     [id number]

023 Mikel



        16:12:41 16:12:41                                         0215    F



                                                                        [place]

 

        2023 Technician         Tech, Adc Sleep Lab [facility]    1.2

.840.114 [id number]

                                        Univers



        14:00:00 14:15:00 Visit           Elizabeth Hawkins 350.1.13.

10         ity of



                                                [facility] 4.2.7.2.686         [place]

[place]



                                                CAMPUS  [phone]         Medi

megan



                                                        193             Branch

 

        2023 Outpatient R       ELIZABETH HAWKINS [facility] 

   [id number] 

Univers



        14:00:00 14:00:00                 ELIZABETH HAWKINS                     

    itfredis [facility]

 

        2023 Orders          Doctor DIGGS    1.2.840.114 [id number]

582 Univers



        00:00:00 00:00:00 Only            Unassigned, JESSICA   350.1.13.10       

  ity of



                                        [facility] 4.2.7.2.686         [place]

as



                                                        [phone]         Medi

megan



                                                        009             Branch

 

        2023 Office          Mike   [facility]    1.2.840.114 [id number]

56 Univers



        10:00:00 10:25:38 Visit           Damon .1.13.10         i

ty of



                                                [facility] 4.2.7.2.686         [place]

[place]



                                                PROFESSIO [phone]         Me

dical



                                                NAL     085             [facility]                 

 

        2023 Outpatient R       DAMON SALDIVAR [facility]    10

[id number] Univers



        10:00:00 10:25:38                 DAMON SALDIVAR                         i

ty of



                                                                        [facility]

 

        2023 Telephone         [facility]    [id number]

3581 Univers



        00:00:00 00:00:00                 Stephanie .1.13.10      

   ity of



                                                [place] 4.2.7.2.686         [place]

[place]



                                                PROFESSIO [phone]         Me

dical



                                                JAIRON     059             [facility]                 

 

        2023 Telephone         [facility]    1.2.139.354 [id number]

8222 Univers



        00:00:00 00:00:00                 Stephanie .1.13.10      

   ity of



                                                [place] 4.2.7.2.686         [place]

[place]



                                                PROFESSIO [phone]         Me

dical



                                                JAIRON     059             [facility]                 

 

        2023 Technician         2, Adc Lab [facility]    1.2.840.114 

[id number] Univers



        14:15:00 14:30:00 Visit           Stephanie Burton 350.1.13.

10         ity of



                                                [facility] 4.2.7.2.686         [place]

[place]



                                                PROFESSIO [phone]         Me

dical



                                                JAIRON     353             [facility]                 

 

        2023 Outpatient R       MICHEAL [facility]    [id number]

621 Univers



        14:15:00 14:15:00                 SENDLIBAN yanes [facility]

 

        2023-01-10 2023-01-10 Outpatient R       ELIZABETH HAWKINS [facility] 

   [id number] 

Univers



        20:00:00 20:00:00                 ELIZABETH HAWKINS [facility]

 

        2023 Telephone         [facility]    [id number] [id number]

6721 Univers



        00:00:00 00:00:00                 Stephanie .1.13.10      

   ity of



                                                [facility] 4.2.7.2.686         [place]

[place]



                                                PROFESSIO [phone]         Me

dical



                                                NAL     [address] [place] [address]                 

 

        2023 [facility] R       [facility]    [id number]

522 Univers



        15:30:00 16:15:35                 SENDIL                          [facility]

 

        2023 Office          [facility]    1.2.840.114 [id number]

93 Univers



        15:30:00 16:15:35 Visit           Stephanie .1.13.10      

   ity of



                                                [place] 4.2.7.2.686         [place]

[place]



                                                PROFESSIO [phone]         [address]                 

 

        2022 Outpatient R       ELIZABETH HAWKINS [facility] 

   [id number] 

Univers



        20:00:00 20:00:00                 ELIZABETH HAWKINS                     

    [facility]

 

        2022 Outpatient                 [facility]     [id number]

022 Mikel



        14:14:32 14:14:32                                         1217    F



                                                                        Xavi

 

        2022 Outpatient                 [id number]

[id number] 



        00:00:00 00:00:00 Visit                   of90-4j1n         d76-1c0m-v 



                                                -h33u-4sq         [id number]-[id number] 



                                                873abi8g3         cff3f4  

 

        2022 Outpatient                 [id number]- [id number]

v83ngg-3 



        00:00:00 00:00:00 Visit                   9fea-4870         fea-4870-9 



                                                -95dd-bdd         5dd-bdd87e 



                                                [id number]         7af4b3  

 

        2022 Telephone         [facility]    [id number]

1650 Univers



        00:00:00 00:00:00                 Stephanie .1.13.10      

   ity of



                                                [facility] 4.2.7.2.686         [place]

[place]



                                                PROFESSIO [phone]         Me

dical



                                                NAL     059             Branch



                                                [facility]                 

 

        2022 Outpatient R       [facility]     [id number]

087 Univers



        06:35:00 11:00:00                 DEL                          ity of



                                                                        [facility]

 

        2022 Hospital         [facility]    1.2.840.114 [id number]

264 Univers



        06:35:00 11:00:00 Encounter         [facility]  350.1.13.10         

ity of



                                        [place]   [place]AR   4.2.7.2.686         [place]

[place]



                                                BA[place]    [phone]         [address]



                                                ([facility])                   

 

        2022 Surgery         [facility]    1.2.840.114 [id number]

35 Univers



        07:30:00 10:30:00                 [facility]  350.1.13.10         it

y of



                                        [place]   [place]AR   4.2.7.2.686         [place]

[place]



                                                [place]    [phone]         [address]



                                                ([facility])                   

 

        2022 Orders          Doctor  DONTAE    1.2.840.114 [id number]

51 Univers



        00:00:00 00:00:00 Only            Unassigned, JESSICA   350.1.13.10       

  ity of



                                        [facility] 4.2.7.2.686         [place]

as



                                                        [phone]         Medi

megan



                                                        009             Branch

 

        2022 Technician         2, Adc Lab [facility]    1.2.840.114 

[id number] Univers



        13:00:00 13:15:00 Visit           Stephanie Burton 350.1.13.

10         ity of



                                                [facility] 4.2.7.2.686         [place]

[place]



                                                PROFESSIO [phone]         Me

dical



                                                NAL     353             [place]



                                                [facility]                 

 

        2022 Outpatient R       MICHEAL [facility]    [id number]

966 Univers



        13:00:00 13:00:00                 SENDIL                          ity of



                                                                        [facility]

 

        2022 Telephone         [facility]    1.2.444.616 [id number]

1109 Univers



        00:00:00 00:00:00                 Damon .1.13.10         i

ty of



                                                [place] 4.2.7.2.686         [place]

[place]



                                                PROFESSIO [phone]         Me

dical



                                                NAL     085             [facility]                 

 

        2022 Outpatient R       MICHEAL [facility]    [id number]

165 Univers



        09:00:00 09:28:50                 SENDIL                          ity of



                                                                        [facility]

 

        2022 Office          Micheal [facility]    1.2.840.114 [id number]

05 Univers



        09:00:00 09:28:50 Visit           Sendil CHARBELLink .1.13.10      

   ity of



                                                [place] 4.2.7.2.686         [place]

[place]



                                                PROFESSIO [phone]         Me

dical



                                                NAL     059             [facility]                 

 

        2022-10-21 2022-10-21 Outpatient R       DAMON SALDIVAR [facility]    10

[id number] Univers



        09:00:00 09:42:40                 DAMON SALDIVAR                         i

ty of



                                                                        [facility]

 

        2022-10-21 2022-10-21 Office          [facility]    1.2.840.114 [id number]

32 Univers



        09:00:00 09:42:40 Visit           Damon .1.13.10         i

ty of



                                                [place] 4.2.7.2.686         [place]

[place]



                                                PROFESSIO [phone]         Me

dical



                                                NAL     085             [facility]                 

 

          2022-10-14 2022-10-14 Technician           Testing, [facility]

tion UNIVERSIT 

1.2.840.114               [id number]                  Univers



        08:00:00 09:30:00 Visit           Damon Saldivar [facility] 350.1.13.10     

    ity of



                                                [facility] 4.2.7.2.686         [place]

[place]



                                                        [phone]         Medi

megan



                                                        083             [place]

 

        2022-10-14 2022-10-14 Outpatient R       DAMON SALDIVAR [facility]    10

[id number] Univers



        08:00:00 08:00:00                 DAMON SALDIVAR                         i

ty of



                                                                        [facility]

 

        2022-10-14 2022-10-14 Orders          DILCIA Saldivar 1.2.589.335 [id number]

5532 Univers



        00:00:00 00:00:00 Only            Damon  [facility] 350.1.13.10         i

ty of



                                                [facility] 4.2.7.2.686         [place]

[place]



                                                        [phone]         Medi

megan



                                                        084             [place]

 

        2022 Technician         2, Adc Lab [facility]    1.2.840.114 

[id number] Univers



        10:15:00 10:30:00 Visit           Miek Damon .1.13.10     

    ity of



                                                [place] 4.2.7.2.686         [place]

[place]



                                                PROFESSIO [phone]         Me

dical



                                                NAL     353             [facility]                 

 

        2022 Outpatient R       DAMON SALDIVAR [facility]    10

[id number] Univers



        10:15:00 10:27:44                 DAMON SALDIVAR                         i

ty [facility]

 

        2022 Office          [facility]    1.2.840.114 [id number]

41 Univers



        09:30:00 10:03:23 Visit           Damon .1.13.10         i

ty of



                                                [place] 4.2.7.2.686         [place]

[place]



                                                PROFESSIO [phone]         Me

dical



                                                NAL     085             [facility]                 

 

        2022 Orders          Doctor  DONTAE    1.2.840.114 [id number]

84 Univers



        00:00:00 00:00:00 Only            Unassigned, JESSICA   350.1.13.10       

  ity of



                                        [facility] 4.2.7.2.686         [place]

as



                                                        [phone]         Medi

megan



                                                        009             [place]

 

        2022 Outpatient R       DAMON SALDIVAR [facility]    10

[id number] Univers



        09:00:00 09:00:00                 DAMON SALDIVAR i

ty [facility]

 

        2022 Outpatient                 [id number]

[id number] 



        00:00:00 00:00:00 Visit                   1in0-4ip1         bc6-4ff8-8 



                                                -8503-fec         503-fecbf4 



                                                [id number]         [id number]  

 

        2022-07-15 2022-07-15 Telephone         Micheal [facility]    1.2.026.567 [id number]

9976 Univers



        00:00:00 00:00:00                 Sendil GLORIA .1.13.10      

   ity of



                                                [place] 4.2.7.2.686         [place]

[place]



                                                PROFESSIO [phone]         Me

dical



                                                NAL     9             [facility]                 

 

        2022 Outpatient                 [id number]

[id number] 



        00:00:00 00:00:00 Visit                   [id number]         266-4bd9-9 



                                                -980f-b19         [id number]-[id number] 



                                                [id number]         [id number]  

 

        2022 [facility]    1.2.228.959 6139

[address] [place] [address]



        00:00:00 00:00:00                 Stephanie CHARBELLink .1.13.10      

   ity of



                                                [place] 4.2.7.2.686         [place]

[place]



                                                PROFESSIO [phone]         [address]                 

 

        2022 \[facility]\""    1.2.840.114 [id number]

168 Univers



        08:48:12 23:59:00 Encounter         Stephanie CALEROFLOYDLink .1.13.10    

     ity of



                                                [place] 4.2.7.2.686         [place]

[place]



                                                CAMPUS  [phone]         [address]

 

        2022 \[facility]\""    1.2.840.114 [id number]

169 Univers



        08:47:58 08:47:58 Encounter         Stephanie CALEROFLOYDLink .1.13.10    

     ity of



                                                [place] 4.2.7.2.686         [place]

[place]



                                                CAMPUS  [phone]         [address]

 

        2022 \[facility]\""    1.2.840.114 [id number]

170 Univers



        08:47:34 08:47:34 Encounter         Stephanie CALEROFLOYDLink .1.13.10    

     ity of



                                                [place] 4.2.7.2.686         [place]

[place]



                                                CAMPUS  [phone]         [address]

 

        2022 Outpatient [facility]    [id number]

656 Univers



        08:47:20 08:47:20                 SENDIL                          [facility]

 

        2022 [facility]         [facility]    1.2.840.114 [id number]

167 Univers



        08:47:20 08:47:20 Encounter         Stephanie .1.13.10    

     ity of



                                                [place] 4.2.7.2.686         [place]

[place]



                                                [place]  [phone]         Medi

megan



                                                        805             [place]

 

        2022 [facility] R       [facility]    [id number]

335 Univers



        15:01:42 23:59:00                 SENDIL                          [facility]

 

        2022 Laboratory         Only, Adc Test [facility]    1.2.840.

114 [id number] 

Univers



        13:00:00 13:15:00 Only            Silvina Skelton 350.1.13.10

         ity of



                                                [place] 4.2.7.2.686         [place]

[place]



                                                [place]  [phone]         Medi

megan



                                                        353             [place]

 

        2022 Outpatient R       NAFISA [facility]    [id number]

95168 Univers



        13:00:00 13:00:00                 SILVINA                         [facility]

 

        2022 [facility] R       [facility]    [id number]

0751 Univers



        09:30:00 09:30:00                 TITO                           [facility]

 

        2022 Telephone         [facility]    .2.840.114 [id number]

72478 Univers



        00:00:00 00:00:00                 Tito .1.13.10         i

ty of



                                                [place] 4.2.7.2.686         [facility]

s



                                                PROFESSIO [phone]         Me

dical



                                                [facility]     204             [place]



                                                [facility]                 

 

        2022 Emergency X       FREDERICK [facility]    ERT     [id number]

31 Univers



        22:25:00 02:36:00                 AGNES                          [facility]

 

        2022 Emergency         Frederick [facility]    1.2.487.493 [id number]

2767 Univers



        22:25:00 02:36:00                 Agnes S [place] 350.1.13.10         

ity of



                                                [place] 4.2.7.2.686         [facility]  [phone]         Medi

megan



                                                        084             [place]

 

        2022 Outpatient R       [facility]    [id number]

5940 Univers



        11:38:53 23:59:00                 TITO                           ity of



                                                                        [facility]

 

        2022 [facility]    1.2.455.174 [id number]

9114 Univers



        11:38:53 23:59:00 Encounter         [facility] 350.1.13.10        

 ity of



                                                [place] 4.2.7.2.686         [facility]  [phone]         Medi

megan



                                                        805             [place]

 

        2022 Office          [facility]    1.2.840.114 [id number]

097 Univers



        08:00:00 08:59:08 Visit           [facility] 350.1.13.10         i

ty of



                                                [place] 4.2.7.2.686         [place]

[place]



                                                PROFESSIO [phone]         Me

dical



                                                [facility]     204             [facility]                 

 

        2022 Outpatient R       [facility]    [id number]

8785 Univers



        08:00:00 08:59:08                 TITO                           ity [facility]

 

        2022 Outpatient R       [facility]    [id number]

8785 Univers



        08:00:00 08:00:00                 TITO                           ity [facility]

 

        2022 Orders          Doctor DIGGS    1.2.840.114 [id number]

16 Univers



        00:00:00 00:00:00 Only            Unassigned, JESSICA   350.1.13.10       

  ity of



                                        [facility] 4.2.7.2.686         [place]

as



                                                        [phone]         Medi

megan



                                                        009             [place]

 

        2022 Telephone         [facility]    1.2.840.114 [id number]

[id number] Univers



        00:00:00 00:00:00                 Tito   [facility] 350.1.13.10         i

ty of



                                                [place] 4.2.7.2.686         [facility]

s



                                                [facility] [phone]         Me

mumtaz DE LA O     188             [facility]                 

 

        2022 [facility]    1.2.840.114 [id number]

[id number] Univers



        06:35:40 23:59:00 Encounter         Lupe [facility] 350.1.13.10    

     ity of



                                                CARE    4.2.7.2.686         [place]

[place]



                                                CENTER .[id number]         Me

gildaren GRAFF 815             [facility]                   

 

        2022 Outpatient R       [facility]    [id number]

[id number] Univers



        00:00:00 23:59:00                 LUPE                         ity o

f



                                                                        [facility]

 

        2022 Outpatient R       [facility]    [id number]

82156 Univers



        00:00:00 00:00:00                 LUPE                         ity o

f



                                                                        [facility]

 

        2022 Outpatient R       [facility]    [id number]

14270 Univers



        00:00:00 00:00:00                 LUPE                         ity o

f



                                                                        [facility]

 

        2022 [facility] R       [facility]    [id number]

8940 Univers



        09:30:00 10:15:45                 [facility]

 

        2022 Office          [facility]    1.2.840.114 [id number]

393 Univers



        09:30:00 10:15:45 Visit           Danielle Machuca SPECIALTY 350.1.13.10     

    ity of



                                                CARE    4.2.7.2.686         [facility]

s



                                                CENTER .[id number]         Me

mumtaz GRAFF 204             [facility]                   

 

        2022 Emergency E       LESNICK, [facility]    [facility]    7500   

 MHBL



        10:02:00 06:39:00                 DENI                           

 

        2022 Outpatient R       [facility]    [id number]

7617 Univers



        13:36:33 23:59:00                 TITO                           [facility]

 

        2022 [facility]    1.2.458.702 [id number]

6537 Univers



        13:36:33 23:59:00 Encounter         [facility]  350.1.13.10         

ity of



                                                [place]   4.2.7.2.686         [place]

[facility]    [phone]         [facility] 801             [place]



                                                ([facility])                   

 

        2022 Outpatient R       GAIL [facility]    [id number]

7617 Univers



        13:36:33 23:59:00                 TITO                           ity [facility]

 

        2022 [facility]patient R       [facility]    [id number]

6680 Univers



        15:30:00 15:30:00                 TITO                           ity [facility]

 

        2022 Office          [facility]    1.2.840.114 [id number]

895 Univers



        15:00:00 15:30:00 Visit           [facility]  350.1.13.10         it

y of



                                                CANCER  4.2.7.2.686         [facility] [phone]         Med

ical



                                                [facility]     204             [place]

 

        2022 Outpatient R       [facility]    [id number]

7953 Univers



        15:00:00 15:00:00                 [facility]                           ity [facility]

 

        2022 Emergency X       TOMI,  [facility]    ERT     [id number]

39 Univers



        18:32:00 20:46:00                 [facility]

 

        2022 Emergency         [facility]    1.2.676.371 [id number]

0921 Univers



        18:32:00 20:46:00                 Pop .1.13.10         i

ty of



                                                [place] 4.2.7.2.686         [facility]

[facility]  [phone]         Medi

megan



                                                        084             [place]

 

        2022 Technician         Lab, Ang-Rmchp [facility]    1.2.840.

114 [id number] 

Univers



        08:00:00 08:24:57 Visit           [facility]  350.1.13.10

         ity of



                                                REGIONAL 4.2.7.2.686         [place]

as



                                                MATERNAL [phone]         Med

ical



                                                & CHILD [address] [place] [address]                 

 

        2022 Outpatient R               [facility]    [id number]

048 Univers



        08:00:00 08:00:00                                                 ity of



                                                                        [facility]

 

        2022 Outpatient R       BLANKA, [facility]    [id number]

048 Univers



        08:00:00 08:00:00                 JESSE                         ity o

f



                                                                        [facility]

 

        2022 Outpatient R       KYLAH, [facility]    [id number]

89972 Univers



        09:00:00 10:02:48                 LUPE yanes o

f



                                                                        [facility]

 

        2022 Office          [facility]    1.2.921.164 0548

5383 Univers



        09:00:00 10:02:48 Visit           Lupe [facility]  350.1.13.10        

 ity of



                                                REGIONAL 4.2.7.2.686         [place]

as



                                                MATERNAL [phone]         Med

ical



                                                & CHILD [address] [place] [address]                 

 

        2022 Orders          Doctor  DONTAE    1.2.840.114 [id number]

18 Univers



        00:00:00 00:00:00 Only            Unassigned, JESSICA   350.1.13.10       

  ity of



                                        [facility] 4.2.7.2.686         [place]

as



                                                        [phone]         Medi

megan



                                                        009             [place]

 

        2021 Emergency X       SINGER, [facility]    ERT     [id number]

17 Univers



        07:29:00 10:51:00                 TOMMY yanes [facility]

 

        2021 Emergency         [facility]    1.2.232.177 [id number]

2496 Univers



        07:29:00 10:51:00                 [facility] 350.1.13.10         i

ty [facility] 4.2.7.2.686         [place]

[facility]  [phone]         Medi

megan



                                                        084             [place]

 

        2021 Outpatient R       KYLAH, [facility]    [id number]

78878 Univers



        09:30:00 09:30:00                 LUPE yanes o

f



                                                                        [facility]

 

        2021 Case            VIRGINIA Montiel  1.2.840.114 [id number]

78 Univers



        00:00:00 00:00:00 Management         Ignacia LAMB WILL   350.1.13.10      

   ity of



                                                [place]   4.2.7.2.686         [place]

[place]



                                                        [phone]         Medi

megan



                                                        086             [place]

 

        2021 Telephone         Gena COLEMAN  1.2.840.114 

[id number] Univers



        00:00:00 00:00:00                 , Emy ENGELY   350.1.13.10         

ity of



                                                [place]   4.2.7.2.686         [place]

[place]



                                                        [phone]         [address]

 

        2021-10-21 2021-10-21 Outpatient R       MICHEAL [facility]    [id number]

728 Univers



        00:00:00 00:00:00                 SENDIL                          ity [facility]

 

        2021 Office          [facility]    1.2.840.114 [id number]

97 Univers



        14:49:01 15:18:26 Visit           Sendil GLORIA [place] 350.1.13.10      

   ity of



                                                [place] 4.2.7.2.686         [place]

[place]



                                                Profess[place] [phone]         Me

dical



                                                [facility]             [place]



                                                [facility]                 

 

        2021 Outpatient R       [facility]    [id number]

059 Univers



        15:00:00 15:00:00                 SENDIL                          ity [facility]

 

        2021 Orders          Doctor  DONTAE    1.2.840.114 [id number]

93 Univers



        00:00:00 00:00:00 Only            Unassigned, JESSICA   350.1.13.10       

  ity of



                                        [facility] 4.2.7.2.686         [place]

as



                                                        [phone]         Medi

megan



                                                        009             [place]

 

        2021 Urgent          Delroyi, Clifford Ayonin   1.2.840.

114 [id number] 

Univers



        12:13:56 13:05:46 Care            Unknown, Attending Pediatric 350.1.13.

10         ity of



                                                s and   4.2.7.2.686         [place]

[place]



                                                Adult   [phone]         Medi

megan



                                                Primary [address]             [facility]anch



                                                Care                    



                                                Clinic                  

 

        2021 Outpatient R       [facility], [facility]    [id number]

[id number]



        12:15:00 12:15:00                 ATTENDING                         [facility]







Results







           Test Description Test Time  Test Comments Results    Result Comments 

Source









                    PULMONARY FUNCTION TEST (RESULTS) 2022-10-14 13:12:39 









                      Test Item  Value      Reference Range Interpretation Comme

nts









             FVC Actual (test code = 3994) 2.34 L                               

  

 

             FEV1 Actual (test code = 3993) 1.82 L                              

   

 

             FEV1/FVC Actual (test code = 3995) 78 %                            

       



[facility] [id number] 
07:09:18





             Test Item    Value        Reference Range Interpretation Comments

 

             WBC (test code = 6.0 K/UL     3.5-11.0                  



             1001)                                               

 

             RBC (test code = 3.65 M/UL    3.80-5.40    L            



             1002)                                               

 

             HEMOGLOBIN (test code 9.8 G/DL     11.5-15.5    L            



             = 1003)                                             

 

             HEMATOCRIT (test code 29.5 %       34.0-45.0    L            



             = 1004)                                             

 

             MCV (test code = 80.8 fL      80.0-99.0                 



             1005)                                               

 

             MCH (test code = 26.8 PG      25.0-33.0                 



             1006)                                               

 

             MCHC (test code = 33.2 G/DL    31.0-36.0                 



             1007)                                               

 

             RDW (test code = 14.0 %       11.5-15.0                 



             1038)                                               

 

             NEUTROPHILS (test 44.4 %                                 



             code = 1008)                                        

 

             LYMPHOCYTES (test 35.7 %                                 



             code = 1010)                                        

 

             MONOCYTES (test code 17.1 %                                 



             = 1011)                                             

 

             EOSINOPHILS (test 2.2 %                                  



             code = 1012)                                        

 

             BASOPHILS (test code 0.3 %                                  



             = 1013)                                             

 

             IMMATURE GRANULOCYTES 0.3 %                                  



             (test code = 1036)                                        

 

             NUCLEATED RBCS (test 0.0 /100 WBC'S See_Comment                [Aut

omated



             code = 1065)                                        message] The sy

stem



                                                                 which generated



                                                                 this result



                                                                 transmitted



                                                                 reference range

:



                                                                 0.0. The refere

nce



                                                                 range was not u

sed



                                                                 to interpret th

is



                                                                 result as



                                                                 normal/abnormal

.

 

             PLATELET COUNT (test 233 K/UL     130-400                   



             code = 1015)                                        

 

             ABSOLUTE NEUTROPHILS 2.65 K/UL    1.50-7.50                 



             (test code = 1066)                                        

 

             ABSOLUTE LYMPHOCYTES 2.13 K/UL    1.00-4.00                 



             (test code = 1067)                                        

 

             ABSOLUTE MONOCYTES 1.02 K/UL    0.20-1.00    H            



             (test code = 1068)                                        

 

             ABSOLUTE EOSINOPHILS 0.13 K/UL    0.00-0.50                 



             (test code = 1040)                                        

 

             ABSOLUTE BASOPHILS 0.02 K/UL    0.00-0.20                 



             (test code = 1069)                                        

 

             ABS IMMATURE 0.02 K/UL    0.00-0.10                 



             GRANULOCYTES (test                                        



             code = 1020)                                        

 

             ABS NUCLEATED RBCS 0.00 K/UL    0.00-0.11                 



             (test code = 45998)                                        



VITAMIN B 12 AND FOLIC [id number] 06:06:29





             Test Item    Value        Reference Range Interpretation Comments

 

             VITAMIN B-12 (test 578 PG/ML    200-950                   



             code = 2840)                                        

 

             FOLIC ACID (test >20.0 UG/L   SEE BELOW                  ***** INTE

RPRETIVE



             code = 2695)                                        RANGES ***** DE

FICIENCY



                                                                 . . . . . . . .

 . . . .



                                                                 . . . UG/L <4.0

 POSSIBLE



                                                                 DEFICIENCY. . .

 . . . .



                                                                 . . . . UG/L 4.

0-5.9



                                                                 SUFFICIENT . . 

. . . . .



                                                                 . . . . . . . .

 UG/L



                                                                 >=6.0 UNLESS OT

HERWISE



                                                                 INDICATED, ALL 

TESTING



                                                                 PERFORMED [facility]



                                                                 PATHOLOGY [facility],



                                                                 INC. [address] [place] [address]

4



                                                                 LABORATORY DIRE

CTOR:



                                                                 ERIK SUE M.D.



                                                                 CLIA NUMBER [id number]

[id number]



                                                                 [facility]

ON NO.



                                                                 [id number]



[id number] 06:06:29





             Test Item    Value        Reference Range Interpretation Comments

 

             FERRITIN (test code = 2075) 147 NG/ML                        



[id number] 03:36:41





             Test Item    Value        Reference Range Interpretation Comments

 

             TRANSFERRIN (test code = 4936) 247 MG/DL    200-360                

   



IRON BINDING CAPACITY AND IRON AND % [id number] 03:35:34





             Test Item    Value        Reference Range Interpretation Comments

 

             IRON, SERUM (test code = 2222) 39 UG/DL                      

   

 

             UNSATURATED IBC (test code = 45681) 261 UG/DL    112-347           

        

 

             CALC TOTAL IBC (test code = 2077) 300 UG/DL    250-450             

      

 

             CALC % IRON SAT (test code = 2079) 13 %         20-50        L     

       



CBC W/AUTO [id number] 00:00:00





             Test Item    Value        Reference Range Interpretation Comments

 

             WBC (test code = 1001) 6.0 K/UL                               

 

             RBC (test code = 1002) 3.65 M/UL                              

 

             HEMOGLOBIN (test code = 1003) 9.8 G/DL                             

  

 

             HEMATOCRIT (test code = 1004) 29.5 %                               

  

 

             MCV (test code = 1005) 80.8 fL                                

 

             MCH (test code = 1006) 26.8 PG                                

 

             MCHC (test code = 1007) 33.2 G/DL                              

 

             RDW (test code = 1038) 14.0 %                                 

 

             NEUTROPHILS (test code = 1008) 44.4 %                              

   

 

             LYMPHOCYTES (test code = 1010) 35.7 %                              

   

 

             MONOCYTES (test code = 1011) 17.1 %                                

 

 

             EOSINOPHILS (test code = 1012) 2.2 %                               

   

 

             BASOPHILS (test code = 1013) 0.3 %                                 

 

 

             IMMATURE GRANULOCYTES (test 0.3 %                                  



             code = 1036)                                        

 

             NUCLEATED RBCS (test code = 0.0 /100WBC'S                          

 



             1065)                                               

 

             PLATELET COUNT (test code = 233 K/UL                               



             1015)                                               

 

             ABSOLUTE NEUTROPHILS (test code 2.65 K/UL                          

    



             = 1066)                                             

 

             ABSOLUTE LYMPHOCYTES (test code 2.13 K/UL                          

    



             = 1067)                                             

 

             ABSOLUTE MONOCYTES (test code = 1.02 K/UL                          

    



             1068)                                               

 

             ABSOLUTE EOSINOPHILS (test code 0.13 K/UL                          

    



             = 1040)                                             

 

             ABSOLUTE BASOPHILS (test code = 0.02 K/UL                          

    



             1069)                                               

 

             ABS IMMATURE GRANULOCYTES (test 0.02 K/UL                          

    



             code = 1020)                                        

 

             ABS NUCLEATED RBCS (test code = 0.00 K/UL                          

    



             58759)                                              



CBC W/AUTO [id number] 00:00:00





             Test Item    Value        Reference Range Interpretation Comments

 

             WBC (test code = 1001) 6.0 K/UL                               

 

             RBC (test code = 1002) 3.65 M/UL                              

 

             HEMOGLOBIN (test code = 1003) 9.8 G/DL                             

  

 

             HEMATOCRIT (test code = 1004) 29.5 %                               

  

 

             MCV (test code = 1005) 80.8 fL                                

 

             MCH (test code = 1006) 26.8 PG                                

 

             MCHC (test code = 1007) 33.2 G/DL                              

 

             RDW (test code = 1038) 14.0 %                                 

 

             NEUTROPHILS (test code = 1008) 44.4 %                              

   

 

             LYMPHOCYTES (test code = 1010) 35.7 %                              

   

 

             MONOCYTES (test code = 1011) 17.1 %                                

 

 

             EOSINOPHILS (test code = 1012) 2.2 %                               

   

 

             BASOPHILS (test code = 1013) 0.3 %                                 

 

 

             IMMATURE GRANULOCYTES (test 0.3 %                                  



             code = 1036)                                        

 

             NUCLEATED RBCS (test code = 0.0 /100WBC'S                          

 



             1065)                                               

 

             PLATELET COUNT (test code = 233 K/UL                               



             1015)                                               

 

             ABSOLUTE NEUTROPHILS (test code 2.65 K/UL                          

    



             = 1066)                                             

 

             ABSOLUTE LYMPHOCYTES (test code 2.13 K/UL                          

    



             = 1067)                                             

 

             ABSOLUTE MONOCYTES (test code = 1.02 K/UL                          

    



             1068)                                               

 

             ABSOLUTE EOSINOPHILS (test code 0.13 K/UL                          

    



             = 1040)                                             

 

             ABSOLUTE BASOPHILS (test code = 0.02 K/UL                          

    



             1069)                                               

 

             ABS IMMATURE GRANULOCYTES (test 0.02 K/UL                          

    



             code = 1020)                                        

 

             ABS NUCLEATED RBCS (test code = 0.00 K/UL                          

    



             78442)                                              



IRON BINDING CAPACITY AND IRON AND % [id number] 00:00:00





             Test Item    Value        Reference Range Interpretation Comments

 

             IRON, SERUM (test code = 2222) 39 UG/DL                            

   

 

             UNSATURATED IBC (test code = 16653) 261 UG/DL                      

        

 

             CALC TOTAL IBC (test code = ) 300 UG/DL                        

      

 

             CALC % IRON SAT (test code = 9) 13 %                            

       



[id number] 00:00:00





             Test Item    Value        Reference Range Interpretation Comments

 

             FERRITIN (test code = 5) 147 NG/ML                              



[id number] 00:00:00





             Test Item    Value        Reference Range Interpretation Comments

 

             TRANSFERRIN (test code = 4936) 247 MG/DL                           

   



VITAMIN B 12 AND FOLIC [id number] 00:00:00





             Test Item    Value        Reference Range Interpretation Comments

 

             VITAMIN B-12 (test code = 2840) 578 PG/ML                          

    

 

             FOLIC ACID (test code = 2695) >20.0 UG/L                           

  



CBC W/AUTO [id number] 00:00:00





             Test Item    Value        Reference Range Interpretation Comments

 

             WBC (test code = 1001) 6.0 K/UL                               

 

             RBC (test code = 1002) 3.65 M/UL                              

 

             HEMOGLOBIN (test code = 1003) 9.8 G/DL                             

  

 

             HEMATOCRIT (test code = 1004) 29.5 %                               

  

 

             MCV (test code = 1005) 80.8 fL                                

 

             MCH (test code = 1006) 26.8 PG                                

 

             MCHC (test code = 1007) 33.2 G/DL                              

 

             RDW (test code = 1038) 14.0 %                                 

 

             NEUTROPHILS (test code = 1008) 44.4 %                              

   

 

             LYMPHOCYTES (test code = 1010) 35.7 %                              

   

 

             MONOCYTES (test code = 1011) 17.1 %                                

 

 

             EOSINOPHILS (test code = 1012) 2.2 %                               

   

 

             BASOPHILS (test code = 1013) 0.3 %                                 

 

 

             IMMATURE GRANULOCYTES (test 0.3 %                                  



             code = 1036)                                        

 

             NUCLEATED RBCS (test code = 0.0 /100WBC'S                          

 



             1065)                                               

 

             PLATELET COUNT (test code = 233 K/UL                               



             1015)                                               

 

             ABSOLUTE NEUTROPHILS (test code 2.65 K/UL                          

    



             = 1066)                                             

 

             ABSOLUTE LYMPHOCYTES (test code 2.13 K/UL                          

    



             = 1067)                                             

 

             ABSOLUTE MONOCYTES (test code = 1.02 K/UL                          

    



             1068)                                               

 

             ABSOLUTE EOSINOPHILS (test code 0.13 K/UL                          

    



             = 1040)                                             

 

             ABSOLUTE BASOPHILS (test code = 0.02 K/UL                          

    



             1069)                                               

 

             ABS IMMATURE GRANULOCYTES (test 0.02 K/UL                          

    



             code = 1020)                                        

 

             ABS NUCLEATED RBCS (test code = 0.00 K/UL                          

    



             28010)                                              



CBC W/AUTO [id number] 00:00:00





             Test Item    Value        Reference Range Interpretation Comments

 

             WBC (test code = 1001) 6.0 K/UL                               

 

             RBC (test code = 1002) 3.65 M/UL                              

 

             HEMOGLOBIN (test code = 1003) 9.8 G/DL                             

  

 

             HEMATOCRIT (test code = 1004) 29.5 %                               

  

 

             MCV (test code = 1005) 80.8 fL                                

 

             MCH (test code = 1006) 26.8 PG                                

 

             MCHC (test code = 1007) 33.2 G/DL                              

 

             RDW (test code = 1038) 14.0 %                                 

 

             NEUTROPHILS (test code = 1008) 44.4 %                              

   

 

             LYMPHOCYTES (test code = 1010) 35.7 %                              

   

 

             MONOCYTES (test code = 1011) 17.1 %                                

 

 

             EOSINOPHILS (test code = 1012) 2.2 %                               

   

 

             BASOPHILS (test code = 1013) 0.3 %                                 

 

 

             IMMATURE GRANULOCYTES (test 0.3 %                                  



             code = 1036)                                        

 

             NUCLEATED RBCS (test code = 0.0 /100WBC'S                          

 



             1065)                                               

 

             PLATELET COUNT (test code = 233 K/UL                               



             1015)                                               

 

             ABSOLUTE NEUTROPHILS (test code 2.65 K/UL                          

    



             = 1066)                                             

 

             ABSOLUTE LYMPHOCYTES (test code 2.13 K/UL                          

    



             = 1067)                                             

 

             ABSOLUTE MONOCYTES (test code = 1.02 K/UL                          

    



             1068)                                               

 

             ABSOLUTE EOSINOPHILS (test code 0.13 K/UL                          

    



             = 1040)                                             

 

             ABSOLUTE BASOPHILS (test code = 0.02 K/UL                          

    



             1069)                                               

 

             ABS IMMATURE GRANULOCYTES (test 0.02 K/UL                          

    



             code = 1020)                                        

 

             ABS NUCLEATED RBCS (test code = 0.00 K/UL                          

    



             06017)                                              



CBC W/AUTO [id number] 00:00:00





             Test Item    Value        Reference Range Interpretation Comments

 

             WBC (test code = 1001) 6.0 K/UL                               

 

             RBC (test code = 1002) 3.65 M/UL                              

 

             HEMOGLOBIN (test code = 1003) 9.8 G/DL                             

  

 

             HEMATOCRIT (test code = 1004) 29.5 %                               

  

 

             MCV (test code = 1005) 80.8 fL                                

 

             MCH (test code = 1006) 26.8 PG                                

 

             MCHC (test code = 1007) 33.2 G/DL                              

 

             RDW (test code = 1038) 14.0 %                                 

 

             NEUTROPHILS (test code = 1008) 44.4 %                              

   

 

             LYMPHOCYTES (test code = 1010) 35.7 %                              

   

 

             MONOCYTES (test code = 1011) 17.1 %                                

 

 

             EOSINOPHILS (test code = 1012) 2.2 %                               

   

 

             BASOPHILS (test code = 1013) 0.3 %                                 

 

 

             IMMATURE GRANULOCYTES (test 0.3 %                                  



             code = 1036)                                        

 

             NUCLEATED RBCS (test code = 0.0 /100WBC'S                          

 



             1065)                                               

 

             PLATELET COUNT (test code = 233 K/UL                               



             1015)                                               

 

             ABSOLUTE NEUTROPHILS (test code 2.65 K/UL                          

    



             = 1066)                                             

 

             ABSOLUTE LYMPHOCYTES (test code 2.13 K/UL                          

    



             = 1067)                                             

 

             ABSOLUTE MONOCYTES (test code = 1.02 K/UL                          

    



             1068)                                               

 

             ABSOLUTE EOSINOPHILS (test code 0.13 K/UL                          

    



             = 1040)                                             

 

             ABSOLUTE BASOPHILS (test code = 0.02 K/UL                          

    



             1069)                                               

 

             ABS IMMATURE GRANULOCYTES (test 0.02 K/UL                          

    



             code = 1020)                                        

 

             ABS NUCLEATED RBCS (test code = 0.00 K/UL                          

    



             76651)                                              



IRON BINDING CAPACITY AND IRON AND % [id number] 00:00:00





             Test Item    Value        Reference Range Interpretation Comments

 

             IRON, SERUM (test code = 2) 39 UG/DL                            

   

 

             UNSATURATED IBC (test code = 73347) 261 UG/DL                      

        

 

             CALC TOTAL IBC (test code = ) 300 UG/DL                        

      

 

             CALC % IRON SAT (test code = ) 13 %                            

       



IRON BINDING CAPACITY AND IRON AND % [id number] 00:00:00





             Test Item    Value        Reference Range Interpretation Comments

 

             IRON, SERUM (test code = 2222) 39 UG/DL                            

   

 

             UNSATURATED IBC (test code = 93823) 261 UG/DL                      

        

 

             CALC TOTAL IBC (test code = ) 300 UG/DL                        

      

 

             CALC % IRON SAT (test code = ) 13 %                            

       



[id number] 00:00:00





             Test Item    Value        Reference Range Interpretation Comments

 

             FERRITIN (test code = ) 147 NG/ML                              



[id number] 00:00:00





             Test Item    Value        Reference Range Interpretation Comments

 

             FERRITIN (test code = ) 147 NG/ML                              



[id number] 00:00:00





             Test Item    Value        Reference Range Interpretation Comments

 

             TRANSFERRIN (test code = 4936) 247 MG/DL                           

   



[id number] 00:00:00





             Test Item    Value        Reference Range Interpretation Comments

 

             TRANSFERRIN (test code = 4936) 247 MG/DL                           

   



VITAMIN B 12 AND FOLIC [id number] 00:00:00





             Test Item    Value        Reference Range Interpretation Comments

 

             VITAMIN B-12 (test code = 2840) 578 PG/ML                          

    

 

             FOLIC ACID (test code = 2695) >20.0 UG/L                           

  



VITAMIN B 12 AND FOLIC [id number] 00:00:00





             Test Item    Value        Reference Range Interpretation Comments

 

             VITAMIN B-12 (test code = 2840) 578 PG/ML                          

    

 

             FOLIC ACID (test code = 2695) >20.0 UG/L                           

  



CBC W/AUTO [id number] 00:00:00





             Test Item    Value        Reference Range Interpretation Comments

 

             WBC (test code = 1001) 6.0 K/UL                               

 

             RBC (test code = 1002) 3.65 M/UL                              

 

             HEMOGLOBIN (test code = 1003) 9.8 G/DL                             

  

 

             HEMATOCRIT (test code = 1004) 29.5 %                               

  

 

             MCV (test code = 1005) 80.8 fL                                

 

             MCH (test code = 1006) 26.8 PG                                

 

             MCHC (test code = 1007) 33.2 G/DL                              

 

             RDW (test code = 1038) 14.0 %                                 

 

             NEUTROPHILS (test code = 1008) 44.4 %                              

   

 

             LYMPHOCYTES (test code = 1010) 35.7 %                              

   

 

             MONOCYTES (test code = 1011) 17.1 %                                

 

 

             EOSINOPHILS (test code = 1012) 2.2 %                               

   

 

             BASOPHILS (test code = 1013) 0.3 %                                 

 

 

             IMMATURE GRANULOCYTES (test 0.3 %                                  



             code = 1036)                                        

 

             NUCLEATED RBCS (test code = 0.0 /100WBC'S                          

 



             1065)                                               

 

             PLATELET COUNT (test code = 233 K/UL                               



             1015)                                               

 

             ABSOLUTE NEUTROPHILS (test code 2.65 K/UL                          

    



             = 1066)                                             

 

             ABSOLUTE LYMPHOCYTES (test code 2.13 K/UL                          

    



             = 1067)                                             

 

             ABSOLUTE MONOCYTES (test code = 1.02 K/UL                          

    



             1068)                                               

 

             ABSOLUTE EOSINOPHILS (test code 0.13 K/UL                          

    



             = 1040)                                             

 

             ABSOLUTE BASOPHILS (test code = 0.02 K/UL                          

    



             1069)                                               

 

             ABS IMMATURE GRANULOCYTES (test 0.02 K/UL                          

    



             code = 1020)                                        

 

             ABS NUCLEATED RBCS (test code = 0.00 K/UL                          

    



             72557)                                              



CBC W/AUTO [id number] 00:00:00





             Test Item    Value        Reference Range Interpretation Comments

 

             WBC (test code = 1001) 6.0 K/UL                               

 

             RBC (test code = 1002) 3.65 M/UL                              

 

             HEMOGLOBIN (test code = 1003) 9.8 G/DL                             

  

 

             HEMATOCRIT (test code = 1004) 29.5 %                               

  

 

             MCV (test code = 1005) 80.8 fL                                

 

             MCH (test code = 1006) 26.8 PG                                

 

             MCHC (test code = 1007) 33.2 G/DL                              

 

             RDW (test code = 1038) 14.0 %                                 

 

             NEUTROPHILS (test code = 1008) 44.4 %                              

   

 

             LYMPHOCYTES (test code = 1010) 35.7 %                              

   

 

             MONOCYTES (test code = 1011) 17.1 %                                

 

 

             EOSINOPHILS (test code = 1012) 2.2 %                               

   

 

             BASOPHILS (test code = 1013) 0.3 %                                 

 

 

             IMMATURE GRANULOCYTES (test 0.3 %                                  



             code = 1036)                                        

 

             NUCLEATED RBCS (test code = 0.0 /100WBC'S                          

 



             1065)                                               

 

             PLATELET COUNT (test code = 233 K/UL                               



             1015)                                               

 

             ABSOLUTE NEUTROPHILS (test code 2.65 K/UL                          

    



             = 1066)                                             

 

             ABSOLUTE LYMPHOCYTES (test code 2.13 K/UL                          

    



             = 1067)                                             

 

             ABSOLUTE MONOCYTES (test code = 1.02 K/UL                          

    



             1068)                                               

 

             ABSOLUTE EOSINOPHILS (test code 0.13 K/UL                          

    



             = 1040)                                             

 

             ABSOLUTE BASOPHILS (test code = 0.02 K/UL                          

    



             1069)                                               

 

             ABS IMMATURE GRANULOCYTES (test 0.02 K/UL                          

    



             code = 1020)                                        

 

             ABS NUCLEATED RBCS (test code = 0.00 K/UL                          

    



             93645)                                              



CBC W/AUTO [id number] 00:00:00





             Test Item    Value        Reference Range Interpretation Comments

 

             WBC (test code = 1001) 6.0 K/UL                               

 

             RBC (test code = 1002) 3.65 M/UL                              

 

             HEMOGLOBIN (test code = 1003) 9.8 G/DL                             

  

 

             HEMATOCRIT (test code = 1004) 29.5 %                               

  

 

             MCV (test code = 1005) 80.8 fL                                

 

             MCH (test code = 1006) 26.8 PG                                

 

             MCHC (test code = 1007) 33.2 G/DL                              

 

             RDW (test code = 1038) 14.0 %                                 

 

             NEUTROPHILS (test code = 1008) 44.4 %                              

   

 

             LYMPHOCYTES (test code = 1010) 35.7 %                              

   

 

             MONOCYTES (test code = 1011) 17.1 %                                

 

 

             EOSINOPHILS (test code = 1012) 2.2 %                               

   

 

             BASOPHILS (test code = 1013) 0.3 %                                 

 

 

             IMMATURE GRANULOCYTES (test 0.3 %                                  



             code = 1036)                                        

 

             NUCLEATED RBCS (test code = 0.0 /100WBC'S                          

 



             1065)                                               

 

             PLATELET COUNT (test code = 233 K/UL                               



             1015)                                               

 

             ABSOLUTE NEUTROPHILS (test code 2.65 K/UL                          

    



             = 1066)                                             

 

             ABSOLUTE LYMPHOCYTES (test code 2.13 K/UL                          

    



             = 1067)                                             

 

             ABSOLUTE MONOCYTES (test code = 1.02 K/UL                          

    



             1068)                                               

 

             ABSOLUTE EOSINOPHILS (test code 0.13 K/UL                          

    



             = 1040)                                             

 

             ABSOLUTE BASOPHILS (test code = 0.02 K/UL                          

    



             1069)                                               

 

             ABS IMMATURE GRANULOCYTES (test 0.02 K/UL                          

    



             code = 1020)                                        

 

             ABS NUCLEATED RBCS (test code = 0.00 K/UL                          

    



             40136)                                              



IRON BINDING CAPACITY AND IRON AND % [id number] 00:00:00





             Test Item    Value        Reference Range Interpretation Comments

 

             IRON, SERUM (test code = 2) 39 UG/DL                            

   

 

             UNSATURATED IBC (test code = 97107) 261 UG/DL                      

        

 

             CALC TOTAL IBC (test code = 7) 300 UG/DL                        

      

 

             CALC % IRON SAT (test code = ) 13 %                            

       



IRON BINDING CAPACITY AND IRON AND % [id number] 00:00:00





             Test Item    Value        Reference Range Interpretation Comments

 

             IRON, SERUM (test code = 2) 39 UG/DL                            

   

 

             UNSATURATED IBC (test code = 38936) 261 UG/DL                      

        

 

             CALC TOTAL IBC (test code = 7) 300 UG/DL                        

      

 

             CALC % IRON SAT (test code = ) 13 %                            

       



[id number] 00:00:00





             Test Item    Value        Reference Range Interpretation Comments

 

             FERRITIN (test code = 5) 147 NG/ML                              



[id number] 00:00:00





             Test Item    Value        Reference Range Interpretation Comments

 

             FERRITIN (test code = 5) 147 NG/ML                              



[id number] 00:00:00





             Test Item    Value        Reference Range Interpretation Comments

 

             TRANSFERRIN (test code = 4936) 247 MG/DL                           

   



[id number] 00:00:00





             Test Item    Value        Reference Range Interpretation Comments

 

             TRANSFERRIN (test code = 4936) 247 MG/DL                           

   



VITAMIN B 12 AND FOLIC [id number] 00:00:00





             Test Item    Value        Reference Range Interpretation Comments

 

             VITAMIN B-12 (test code = 2840) 578 PG/ML                          

    

 

             FOLIC ACID (test code = 2695) >20.0 UG/L                           

  



VITAMIN B 12 AND FOLIC [id number] 00:00:00





             Test Item    Value        Reference Range Interpretation Comments

 

             VITAMIN B-12 (test code = 2840) 578 PG/ML                          

    

 

             FOLIC ACID (test code = 2695) >20.0 UG/L                           

  



Complete Metabolic [id number] 04:19:03





             Test Item    Value        Reference Range Interpretation Comments

 

             NA (test code = 134 mmol/L   135-145      L            



             7709993807)                                         

 

             K (test code = 4.5 mmol/L   3.5-5.0                   



             2673254864)                                         

 

             CL (test code = 99 mmol/L                        



             7863897183)                                         

 

             CO2 TOTAL (test code = 22 mmol/L    23-31        L            



             2272593388)                                         

 

             AGAP (test code =              2-16                      



             2919565703)                                         

 

             BUN (test code = 24 mg/dL     7-23         H            



             9609262346)                                         

 

             GLUCOSE (test code = 95 mg/dL                         



             4015040609)                                         

 

             CREATININE (test code = 0.49 mg/dL   0.50-1.04    L            



             1239189651)                                         

 

             TOTAL BILI (test code = 0.7 mg/dL    0.1-1.1                   



             4470623235)                                         

 

             CALCIUM (test code = 9.9 mg/dL    8.6-10.6                  



             9341813455)                                         

 

             T PROTEIN (test code = 7.4 g/dL     6.3-8.2                   



             1104401053)                                         

 

             ALBUMIN (test code = 4.3 g/dL     3.5-5.0                   



             0224459442)                                         

 

             ALK PHOS (test code = 198 U/L             H            



             2337430867)                                         

 

             ALTv (test code = 25 U/L       5-35                      



             1742-6)                                             

 

             AST(SGOT) (test code = 35 U/L       13-40                     



             0623352594)                                         

 

             eGFR (test code =              mL/min/1.73m2              



             5242518251)                                         

 

             RAYO (test code = RAYO) Association of                           



                          Glomerular Filtration                           



                          Rate (GFR) and Staging                           



                          of Kidney Disease*                           



                          +---------------------                           



                          --+-------------------                           



                          --+-------------------                           



                          ------+| GFR                           



                          (mL/min/1.73 m2) ?|                           



                          With Kidney Damage ?|                           



                          ?Without Kidney                           



                          Damage+---------------                           



                          --------+-------------                           



                          --------+-------------                           



                          ------------+| ?>90 ?                           



                          ? ? ? ? ? ? ? ?|                           



                          ?Stage one ? ? ? ? ?|                           



                          ? Normal ? ? ? ? ? ? ?                           



                          ?+--------------------                           



                          ---+------------------                           



                          ---+------------------                           



                          -------+| ?60-89 ? ? ?                           



                          ? ? ? ? ?| ?Stage two                           



                          ? ? ? ? ?| ? Decreased                           



                          GFR ? ? ? ?                            



                          +---------------------                           



                          --+-------------------                           



                          --+-------------------                           



                          ------+| ?30-59 ? ? ?                           



                          ? ? ? ? ?| ?Stage                           



                          three ? ? ? ?| ? Stage                           



                          three ? ? ? ? ?                           



                          +---------------------                           



                          --+-------------------                           



                          --+-------------------                           



                          ------+| ?15-29 ? ? ?                           



                          ? ? ? ? ?| ?Stage four                           



                          ? ? ? ? | ? Stage four                           



                          ? ? ? ? ?                              



                          ?+--------------------                           



                          ---+------------------                           



                          ---+------------------                           



                          -------+| ?<15 (or                           



                          dialysis) ? ?| ?Stage                           



                          five ? ? ? ? | ? Stage                           



                          five ? ? ? ? ?                           



                          ?+--------------------                           



                          ---+------------------                           



                          ---+------------------                           



                          -------+ *Each stage                           



                          assumes the associated                           



                          GFR level has been in                           



                          effect for at least                           



                          three months. ?Stages                           



                          1 to 5, with or                           



                          without kidney                           



                          disease, indicate                           



                          chronic kidney                           



                          disease. Notes:                           



                          Determination of                           



                          stages one and two                           



                          (with eGFR                             



                          >59mL/min/1.73 m2)                           



                          requires estimation of                           



                          kidney damage for at                           



                          least three months as                           



                          defined by structural                           



                          or functional                           



                          abnormalities of the                           



                          kidney, manifested by                           



                          either:Pathological                           



                          abnormalities or                           



                          Markers of kidney                           



                          damage (including                           



                          abnormalities in the                           



                          composition of the                           



                          blood or urine or                           



                          abnormalities in                           



                          imaging tests).                           

 

             Lab Interpretation Abnormal                               



             (test code = 85905-1)                                        



[id number]18[facility]22





             Test Item    Value        Reference Range Interpretation Comments

 

             LIPASE (test code = 0442358361) 58 U/L       0-220                 

    

 

             Lab Interpretation (test code = Normal                             

    



             86035-6)                                            



[facility] [id number] 04:04:03





             Test Item    Value        Reference Range Interpretation Comments

 

             WBC (test code =              See_Comment                [Automated



             9103-2)                                             message] The sy

stem



                                                                 which generated



                                                                 this result



                                                                 transmitted



                                                                 reference range

:



                                                                 4.30 - 11.10



                                                                 10*3/?L. The



                                                                 reference range

 was



                                                                 not used to



                                                                 interpret this



                                                                 result as



                                                                 normal/abnormal

.

 

             RBC (test code =              See_Comment                [Automated



             850-9)                                              message] The sy

stem



                                                                 which generated



                                                                 this result



                                                                 transmitted



                                                                 reference range

:



                                                                 3.93 - 5.25



                                                                 10*6/?L. The



                                                                 reference range

 was



                                                                 not used to



                                                                 interpret this



                                                                 result as



                                                                 normal/abnormal

.

 

             HGB (test code = 10.6 g/dL    11.6-15.0    L            



             718-7)                                              

 

             HCT (test code = 32.5 %       35.7-45.2    L            



             4544-3)                                             

 

             MCV (test code = 82.7 fL      80.6-95.5                 



             787-2)                                              

 

             MCH (test code = 27.0 pg      25.9-32.8                 



             785-6)                                              

 

             MCHC (test code = 32.6 g/dL    31.6-35.1                 



             786-4)                                              

 

             RDW-SD (test code = 40.3 fL      39.0-49.9                 



             06229-8)                                            

 

             RDW-CV (test code = 13.2 %       12.0-15.5                 



             788-0)                                              

 

             PLT (test code =              See_Comment                [Automated



             777-3)                                              message] The sy

stem



                                                                 which generated



                                                                 this result



                                                                 transmitted



                                                                 reference range

:



                                                                 166 - 358 10*3/

?L.



                                                                 The reference r

ana



                                                                 was not used to



                                                                 interpret this



                                                                 result as



                                                                 normal/abnormal

.

 

             MPV (test code = 10.3 fL      9.5-12.9                  



             88154-5)                                            

 

             NRBC/100 WBC (test              See_Comment                [Automat

ed



             code = [id number])                                        message] 

The system



                                                                 which generated



                                                                 this result



                                                                 transmitted



                                                                 reference range

:



                                                                 0.0 - 10.0 /100



                                                                 WBCs. The refer

ence



                                                                 range was not u

sed



                                                                 to interpret th

is



                                                                 result as



                                                                 normal/abnormal

.

 

             NRBC x10^3 (test code <0.01        See_Comment                [Auto

mated



             = [id number])                                        message] The s

ystem



                                                                 which generated



                                                                 this result



                                                                 transmitted



                                                                 reference range

:



                                                                 10*3/?L. The



                                                                 reference range

 was



                                                                 not used to



                                                                 interpret this



                                                                 result as



                                                                 normal/abnormal

.

 

             GRAN MAT (NEUT) % 73.6 %                                 



             (test code = 770-8)                                        

 

             IMM GRAN % (test code 0.50 %                                 



             = [id number])                                        

 

             LYMPH % (test code = 17.2 %                                 



             736-9)                                              

 

             MONO % (test code = 8.4 %                                  



             5905-5)                                             

 

             EOS % (test code = 0.1 %                                  



             713-8)                                              

 

             BASO % (test code = 0.2 %                                  



             706-2)                                              

 

             GRAN MAT x10^3(ANC) 7.13 10*3/uL 1.88-7.09    H            



             (test code =                                        



             [id number])                                         

 

             IMM GRAN x10^3 (test 0.05 10*3/uL 0.00-0.06                 



             code = [id number])                                        

 

             LYMPH x10^3 (test code 1.67 10*3/uL 1.32-3.29                 



             = 731-0)                                            

 

             MONO x10^3 (test code 0.81 10*3/uL 0.33-0.92                 



             = 742-7)                                            

 

             EOS x10^3 (test code = <0.03        0.03-0.39    L            



             711-2)                                              

 

             BASO x10^3 (test code <0.03        0.01-0.07                 



             = 704-7)                                            

 

             Lab Interpretation Abnormal                               



             (test code = 00957-4)                                        



[facility] URINALYSIS, [id number] 13:28:00





             Test Item    Value        Reference Range Interpretation Comments

 

             POCT U SP GRAV (test code = 1.020 mg/dl  1.005-1.025               



             3255)                                               

 

             POCT PH U (test code = 3254) 5.5 mg/dl    5-8                      

 

 

             POCT U LEUK EST (test code = positive     Negative - Negative      

        



             3263)                                               

 

             POCT U NIT (test code = 3262) Negative     Negative - Negative     

         

 

             POCT U PROT (test code = Negative     Negative - Negative          

    



             3259)                                               

 

             POCT U GLU (test code = 3256) Negative     Negative - Negative     

         

 

             POCT U KETONE (test code = Negative     Negative - Negative        

      



             3258)                                               

 

             POCT U UROBILI (test code = 0.2 mg/dl    0.2-1                     



             3260)                                               

 

             POCT U BILI (test code = Negative     Negative - Negative          

    



             3261)                                               

 

             POCT U BLD (test code = 3257) Negative     Negative - Negative     

         

 

             POCT U COLOR (test code = yellow                                 



             3266)                                               

 

             POCT U APPEAR (test code = clear                                  



             3267)                                               



[facility] URINALYSIS, [id number] 13:28:00





             Test Item    Value        Reference Range Interpretation Comments

 

             POCT U SP GRAV (test code = 1.020 mg/dl  1.005-1.025               



             3255)                                               

 

             POCT PH U (test code = 3254) 5.5 mg/dl    5-8                      

 

 

             POCT U LEUK EST (test code = positive     Negative - Negative      

        



             3263)                                               

 

             POCT U NIT (test code = 3262) Negative     Negative - Negative     

         

 

             POCT U PROT (test code = Negative     Negative - Negative          

    



             3259)                                               

 

             POCT U GLU (test code = 3256) Negative     Negative - Negative     

         

 

             POCT U KETONE (test code = Negative     Negative - Negative        

      



             3258)                                               

 

             POCT U UROBILI (test code = 0.2 mg/dl    0.2-1                     



             3260)                                               

 

             POCT U BILI (test code = Negative     Negative - Negative          

    



             3261)                                               

 

             POCT U BLD (test code = 3257) Negative     Negative - Negative     

         

 

             POCT U COLOR (test code = yellow                                 



             3266)                                               

 

             POCT U APPEAR (test code = clear                                  



             3267)                                               



[id number] 
04:25:49





             Test Item    Value        Reference Range Interpretation Comments

 

             GLUCOSE (test code = 92 MG/DL     70-99                     



             )                                               

 

             BUN (test code = 22 MG/DL     6-20         H            



             )                                               

 

             CREATININE (test 0.67 MG/DL   0.60-1.30                 



             code = 2214)                                        

 

             eGFR ( CKD-EPI) 104          >60                       



             (test code = 33870) ML/MIN/1.73                            

 

             CALC BUN/CREAT (test 33 RATIO     6-28         H            



             code = 2235)                                        

 

             SODIUM (test code = 145 MEQ/L    133-146                   



             )                                               

 

             POTASSIUM (test code 4.8 MEQ/L    3.5-5.4                   



             = 8)                                             

 

             CHLORIDE (test code 104 MEQ/L                        



             = 2215)                                             

 

             CARBON DIOXIDE (test 30 MEQ/L     19-31                     



             code = 2206)                                        

 

             CALCIUM (test code = 10.4 MG/DL   8.5-10.5                  



             )                                               

 

             PROTEIN, TOTAL (test 6.9 G/DL     6.1-8.3                   



             code = 2229)                                        

 

             ALBUMIN (test code = 4.0 G/DL     3.5-5.2                   



             )                                               

 

             CALC GLOBULIN (test 2.9 G/DL     1.9-3.7                   



             code = 2240)                                        

 

             CALC A/G RATIO (test 1.4 RATIO    1.0-2.6                   



             code = 2234)                                        

 

             BILIRUBIN, TOTAL <0.2 MG/DL   See_Comment                [Automated

 message]



             (test code = 2207)                                        The syste

m which



                                                                 generated this 

result



                                                                 transmitted ref

erence



                                                                 range: <=1.2. T

he



                                                                 reference range

 was



                                                                 not used to int

erpret



                                                                 this result as



                                                                 normal/abnormal

.

 

             ALKALINE PHOSPHATASE 132 U/L                          



             (test code = 2204)                                        

 

             AST (test code = 29 U/L       9-40                      



             2218)                                               

 

             ALT (test code = 32 U/L       5-40                       UNLESS OTH

ERWISE



             2219)                                               INDICATED, ALL



                                                                 TESTING PERFORM

ED



                                                                 ATCLINICAL PATH

OLOGY



                                                                 LABORATORIES, I

[place].



                                                                 [address] [facility] [place],



                                                                 [place] [address] [facility]



                                                                 DIRECTOR: ERIK MERCHATN M.D.

 IA



                                                                 NUMBER [id number]

[id number]



                                                                 ACCREDITATION N

O.



                                                                 [id number]



CBC W/AUTO DIFF WITH [id number] 02:38:49





             Test Item    Value        Reference Range Interpretation Comments

 

             WBC (test code = 4.7 K/UL     3.5-11.0                  



             1001)                                               

 

             RBC (test code = 3.17 M/UL    3.80-5.40    L            



             1002)                                               

 

             HEMOGLOBIN (test code 8.3 G/DL     11.5-15.5    L            



             = 1003)                                             

 

             HEMATOCRIT (test code 25.8 %       34.0-45.0    L            



             = 1004)                                             

 

             MCV (test code = 81.4 fL      80.0-99.0                 



             1005)                                               

 

             MCH (test code = 26.2 PG      25.0-33.0                 



             1006)                                               

 

             MCHC (test code = 32.2 G/DL    31.0-36.0                 



             1007)                                               

 

             RDW (test code = 13.6 %       11.5-15.0                 



             1038)                                               

 

             NEUTROPHILS (test 43.5 %                                 



             code = 1008)                                        

 

             LYMPHOCYTES (test 38.6 %                                 



             code = 1010)                                        

 

             MONOCYTES (test code 14.8 %                                 



             = 1011)                                             

 

             EOSINOPHILS (test 2.3 %                                  



             code = 1012)                                        

 

             BASOPHILS (test code 0.4 %                                  



             = 1013)                                             

 

             IMMATURE GRANYLOCYTES 0.4 %                                  



             (test code = 1036)                                        

 

             NUCLEATED RBCS (test 0.0 /100 WBC'S See_Comment                [Aut

omated



             code = 1065)                                        message] The sy

stem



                                                                 which generated



                                                                 this result



                                                                 transmitted



                                                                 reference range

:



                                                                 0.0. The refere

nce



                                                                 range was not u

sed



                                                                 to interpret th

is



                                                                 result as



                                                                 normal/abnormal

.

 

             PLATELET COUNT (test 250 K/UL     130-400                   



             code = 1015)                                        

 

             ABSOLUTE NEUTROPHILS 2.05 K/UL    1.50-7.50                 



             (test code = 1066)                                        

 

             ABSOLUTE LYMPHOCYTES 1.82 K/UL    1.00-4.00                 



             (test code = 1067)                                        

 

             ABSOLUTE MONOCYTES 0.70 K/UL    0.20-1.00                 



             (test code = 1068)                                        

 

             ABSOLUTE EOSINOPHILS 0.11 K/UL    0.00-0.50                 



             (test code = 1040)                                        

 

             ABSOLUTE BASOPHILS 0.02 K/UL    0.00-0.20                 



             (test code = 1069)                                        

 

             ABS IMMATURE 0.02 K/UL    0.00-0.10                 



             GRANULOCYTES (test                                        



             code = 1020)                                        

 

             ABS NUCLEATED RBCS 0.00 K/UL    0.00-0.11                 



             (test code = 52609)                                        



CBC W/AUTO [id number] 00:00:00





             Test Item    Value        Reference Range Interpretation Comments

 

             WBC (test code = 1001) 4.7 K/UL                               

 

             RBC (test code = 1002) 3.17 M/UL                              

 

             HEMOGLOBIN (test code = 1003) 8.3 G/DL                             

  

 

             HEMATOCRIT (test code = 1004) 25.8 %                               

  

 

             MCV (test code = 1005) 81.4 fL                                

 

             MCH (test code = 1006) 26.2 PG                                

 

             MCHC (test code = 1007) 32.2 G/DL                              

 

             RDW (test code = 1038) 13.6 %                                 

 

             NEUTROPHILS (test code = 1008) 43.5 %                              

   

 

             LYMPHOCYTES (test code = 1010) 38.6 %                              

   

 

             MONOCYTES (test code = 1011) 14.8 %                                

 

 

             EOSINOPHILS (test code = 1012) 2.3 %                               

   

 

             BASOPHILS (test code = 1013) 0.4 %                                 

 

 

             IMMATURE GRANYLOCYTES (test 0.4 %                                  



             code = 1036)                                        

 

             NUCLEATED RBCS (test code = 0.0 /100WBC'S                          

 



             1065)                                               

 

             PLATELET COUNT (test code = 250 K/UL                               



             1015)                                               

 

             ABSOLUTE NEUTROPHILS (test code 2.05 K/UL                          

    



             = 1066)                                             

 

             ABSOLUTE LYMPHOCYTES (test code 1.82 K/UL                          

    



             = 1067)                                             

 

             ABSOLUTE MONOCYTES (test code = 0.70 K/UL                          

    



             1068)                                               

 

             ABSOLUTE EOSINOPHILS (test code 0.11 K/UL                          

    



             = 1040)                                             

 

             ABSOLUTE BASOPHILS (test code = 0.02 K/UL                          

    



             1069)                                               

 

             ABS IMMATURE GRANULOCYTES (test 0.02 K/UL                          

    



             code = 1020)                                        

 

             ABS NUCLEATED RBCS (test code = 0.00 K/UL                          

    



             84899)                                              



CBC W/AUTO [id number] 00:00:00





             Test Item    Value        Reference Range Interpretation Comments

 

             WBC (test code = 1001) 4.7 K/UL                               

 

             RBC (test code = 1002) 3.17 M/UL                              

 

             HEMOGLOBIN (test code = 1003) 8.3 G/DL                             

  

 

             HEMATOCRIT (test code = 1004) 25.8 %                               

  

 

             MCV (test code = 1005) 81.4 fL                                

 

             MCH (test code = 1006) 26.2 PG                                

 

             MCHC (test code = 1007) 32.2 G/DL                              

 

             RDW (test code = 1038) 13.6 %                                 

 

             NEUTROPHILS (test code = 1008) 43.5 %                              

   

 

             LYMPHOCYTES (test code = 1010) 38.6 %                              

   

 

             MONOCYTES (test code = 1011) 14.8 %                                

 

 

             EOSINOPHILS (test code = 1012) 2.3 %                               

   

 

             BASOPHILS (test code = 1013) 0.4 %                                 

 

 

             IMMATURE GRANYLOCYTES (test 0.4 %                                  



             code = 1036)                                        

 

             NUCLEATED RBCS (test code = 0.0 /100WBC'S                          

 



             1065)                                               

 

             PLATELET COUNT (test code = 250 K/UL                               



             1015)                                               

 

             ABSOLUTE NEUTROPHILS (test code 2.05 K/UL                          

    



             = 1066)                                             

 

             ABSOLUTE LYMPHOCYTES (test code 1.82 K/UL                          

    



             = 1067)                                             

 

             ABSOLUTE MONOCYTES (test code = 0.70 K/UL                          

    



             1068)                                               

 

             ABSOLUTE EOSINOPHILS (test code 0.11 K/UL                          

    



             = 1040)                                             

 

             ABSOLUTE BASOPHILS (test code = 0.02 K/UL                          

    



             1069)                                               

 

             ABS IMMATURE GRANULOCYTES (test 0.02 K/UL                          

    



             code = 1020)                                        

 

             ABS NUCLEATED RBCS (test code = 0.00 K/UL                          

    



             25270)                                              



COMPREHENSIVE METABOLIC [id number] 00:00:00





             Test Item    Value        Reference Range Interpretation Comments

 

             GLUCOSE (test code = 2217) 92 MG/DL                               

 

             BUN (test code = 2208) 22 MG/DL                               

 

             CREATININE (test code = 2214) 0.67 MG/DL                           

  

 

             eGFR ( CKD-EPI) (test 104 ML/MIN/1.73                          

 



             code = 24966)                                        

 

             CALC BUN/CREAT (test code = 33 RATIO                               



             2235)                                               

 

             SODIUM (test code = 2231) 145 MEQ/L                              

 

             POTASSIUM (test code = 2228) 4.8 MEQ/L                             

 

 

             CHLORIDE (test code = 2215) 104 MEQ/L                              

 

             CARBON DIOXIDE (test code = 30 MEQ/L                               



             2206)                                               

 

             CALCIUM (test code = 2209) 10.4 MG/DL                             

 

             PROTEIN, TOTAL (test code = 6.9 G/DL                               



             )                                               

 

             ALBUMIN (test code = 2201) 4.0 G/DL                               

 

             CALC GLOBULIN (test code = 2.9 G/DL                               



             2240)                                               

 

             CALC A/G RATIO (test code = 1.4 RATIO                              



             2234)                                               

 

             BILIRUBIN, TOTAL (test code = <0.2 MG/DL                           

  



             2207)                                               

 

             ALKALINE PHOSPHATASE (test 132 U/L                                



             code = 2204)                                        

 

             AST (test code = 2218) 29 U/L                                 

 

             ALT (test code = 2219) 32 U/L                                 



CBC W/AUTO [id number] 00:00:00





             Test Item    Value        Reference Range Interpretation Comments

 

             WBC (test code = 1001) 4.7 K/UL                               

 

             RBC (test code = 1002) 3.17 M/UL                              

 

             HEMOGLOBIN (test code = 1003) 8.3 G/DL                             

  

 

             HEMATOCRIT (test code = 1004) 25.8 %                               

  

 

             MCV (test code = 1005) 81.4 fL                                

 

             MCH (test code = 1006) 26.2 PG                                

 

             MCHC (test code = 1007) 32.2 G/DL                              

 

             RDW (test code = 1038) 13.6 %                                 

 

             NEUTROPHILS (test code = 1008) 43.5 %                              

   

 

             LYMPHOCYTES (test code = 1010) 38.6 %                              

   

 

             MONOCYTES (test code = 1011) 14.8 %                                

 

 

             EOSINOPHILS (test code = 1012) 2.3 %                               

   

 

             BASOPHILS (test code = 1013) 0.4 %                                 

 

 

             IMMATURE GRANYLOCYTES (test 0.4 %                                  



             code = 1036)                                        

 

             NUCLEATED RBCS (test code = 0.0 /100WBC'S                          

 



             1065)                                               

 

             PLATELET COUNT (test code = 250 K/UL                               



             1015)                                               

 

             ABSOLUTE NEUTROPHILS (test code 2.05 K/UL                          

    



             = 1066)                                             

 

             ABSOLUTE LYMPHOCYTES (test code 1.82 K/UL                          

    



             = 1067)                                             

 

             ABSOLUTE MONOCYTES (test code = 0.70 K/UL                          

    



             1068)                                               

 

             ABSOLUTE EOSINOPHILS (test code 0.11 K/UL                          

    



             = 1040)                                             

 

             ABSOLUTE BASOPHILS (test code = 0.02 K/UL                          

    



             1069)                                               

 

             ABS IMMATURE GRANULOCYTES (test 0.02 K/UL                          

    



             code = 1020)                                        

 

             ABS NUCLEATED RBCS (test code = 0.00 K/UL                          

    



             01860)                                              



CBC W/AUTO [id number] 00:00:00





             Test Item    Value        Reference Range Interpretation Comments

 

             WBC (test code = 1001) 4.7 K/UL                               

 

             RBC (test code = 1002) 3.17 M/UL                              

 

             HEMOGLOBIN (test code = 1003) 8.3 G/DL                             

  

 

             HEMATOCRIT (test code = 1004) 25.8 %                               

  

 

             MCV (test code = 1005) 81.4 fL                                

 

             MCH (test code = 1006) 26.2 PG                                

 

             MCHC (test code = 1007) 32.2 G/DL                              

 

             RDW (test code = 1038) 13.6 %                                 

 

             NEUTROPHILS (test code = 1008) 43.5 %                              

   

 

             LYMPHOCYTES (test code = 1010) 38.6 %                              

   

 

             MONOCYTES (test code = 1011) 14.8 %                                

 

 

             EOSINOPHILS (test code = 1012) 2.3 %                               

   

 

             BASOPHILS (test code = 1013) 0.4 %                                 

 

 

             IMMATURE GRANYLOCYTES (test 0.4 %                                  



             code = 1036)                                        

 

             NUCLEATED RBCS (test code = 0.0 /100WBC'S                          

 



             1065)                                               

 

             PLATELET COUNT (test code = 250 K/UL                               



             1015)                                               

 

             ABSOLUTE NEUTROPHILS (test code 2.05 K/UL                          

    



             = 1066)                                             

 

             ABSOLUTE LYMPHOCYTES (test code 1.82 K/UL                          

    



             = 1067)                                             

 

             ABSOLUTE MONOCYTES (test code = 0.70 K/UL                          

    



             1068)                                               

 

             ABSOLUTE EOSINOPHILS (test code 0.11 K/UL                          

    



             = 1040)                                             

 

             ABSOLUTE BASOPHILS (test code = 0.02 K/UL                          

    



             1069)                                               

 

             ABS IMMATURE GRANULOCYTES (test 0.02 K/UL                          

    



             code = 1020)                                        

 

             ABS NUCLEATED RBCS (test code = 0.00 K/UL                          

    



             03902)                                              



COMPREHENSIVE METABOLIC [id number] 00:00:00





             Test Item    Value        Reference Range Interpretation Comments

 

             GLUCOSE (test code = 2217) 92 MG/DL                               

 

             BUN (test code = 2208) 22 MG/DL                               

 

             CREATININE (test code = 2214) 0.67 MG/DL                           

  

 

             eGFR ( CKD-EPI) (test 104 ML/MIN/1.73                          

 



             code = 81317)                                        

 

             CALC BUN/CREAT (test code = 33 RATIO                               



             2235)                                               

 

             SODIUM (test code = 2231) 145 MEQ/L                              

 

             POTASSIUM (test code = 2228) 4.8 MEQ/L                             

 

 

             CHLORIDE (test code = 2215) 104 MEQ/L                              

 

             CARBON DIOXIDE (test code = 30 MEQ/L                               



             )                                               

 

             CALCIUM (test code = 2209) 10.4 MG/DL                             

 

             PROTEIN, TOTAL (test code = 6.9 G/DL                               



             )                                               

 

             ALBUMIN (test code = 2201) 4.0 G/DL                               

 

             CALC GLOBULIN (test code = 2.9 G/DL                               



             2240)                                               

 

             CALC A/G RATIO (test code = 1.4 RATIO                              



             2234)                                               

 

             BILIRUBIN, TOTAL (test code = <0.2 MG/DL                           

  



             )                                               

 

             ALKALINE PHOSPHATASE (test 132 U/L                                



             code = 2204)                                        

 

             AST (test code = 2218) 29 U/L                                 

 

             ALT (test code = 2219) 32 U/L                                 



CBC W/AUTO [id number] 00:00:00





             Test Item    Value        Reference Range Interpretation Comments

 

             WBC (test code = 1001) 4.7 K/UL                               

 

             RBC (test code = 1002) 3.17 M/UL                              

 

             HEMOGLOBIN (test code = 1003) 8.3 G/DL                             

  

 

             HEMATOCRIT (test code = 1004) 25.8 %                               

  

 

             MCV (test code = 1005) 81.4 fL                                

 

             MCH (test code = 1006) 26.2 PG                                

 

             MCHC (test code = 1007) 32.2 G/DL                              

 

             RDW (test code = 1038) 13.6 %                                 

 

             NEUTROPHILS (test code = 1008) 43.5 %                              

   

 

             LYMPHOCYTES (test code = 1010) 38.6 %                              

   

 

             MONOCYTES (test code = 1011) 14.8 %                                

 

 

             EOSINOPHILS (test code = 1012) 2.3 %                               

   

 

             BASOPHILS (test code = 1013) 0.4 %                                 

 

 

             IMMATURE GRANYLOCYTES (test 0.4 %                                  



             code = 1036)                                        

 

             NUCLEATED RBCS (test code = 0.0 /100WBC'S                          

 



             1065)                                               

 

             PLATELET COUNT (test code = 250 K/UL                               



             1015)                                               

 

             ABSOLUTE NEUTROPHILS (test code 2.05 K/UL                          

    



             = 1066)                                             

 

             ABSOLUTE LYMPHOCYTES (test code 1.82 K/UL                          

    



             = 1067)                                             

 

             ABSOLUTE MONOCYTES (test code = 0.70 K/UL                          

    



             1068)                                               

 

             ABSOLUTE EOSINOPHILS (test code 0.11 K/UL                          

    



             = 1040)                                             

 

             ABSOLUTE BASOPHILS (test code = 0.02 K/UL                          

    



             1069)                                               

 

             ABS IMMATURE GRANULOCYTES (test 0.02 K/UL                          

    



             code = 1020)                                        

 

             ABS NUCLEATED RBCS (test code = 0.00 K/UL                          

    



             41012)                                              



CBC W/AUTO [id number] 00:00:00





             Test Item    Value        Reference Range Interpretation Comments

 

             WBC (test code = 1001) 4.7 K/UL                               

 

             RBC (test code = 1002) 3.17 M/UL                              

 

             HEMOGLOBIN (test code = 1003) 8.3 G/DL                             

  

 

             HEMATOCRIT (test code = 1004) 25.8 %                               

  

 

             MCV (test code = 1005) 81.4 fL                                

 

             MCH (test code = 1006) 26.2 PG                                

 

             MCHC (test code = 1007) 32.2 G/DL                              

 

             RDW (test code = 1038) 13.6 %                                 

 

             NEUTROPHILS (test code = 1008) 43.5 %                              

   

 

             LYMPHOCYTES (test code = 1010) 38.6 %                              

   

 

             MONOCYTES (test code = 1011) 14.8 %                                

 

 

             EOSINOPHILS (test code = 1012) 2.3 %                               

   

 

             BASOPHILS (test code = 1013) 0.4 %                                 

 

 

             IMMATURE GRANYLOCYTES (test 0.4 %                                  



             code = 1036)                                        

 

             NUCLEATED RBCS (test code = 0.0 /100WBC'S                          

 



             1065)                                               

 

             PLATELET COUNT (test code = 250 K/UL                               



             1015)                                               

 

             ABSOLUTE NEUTROPHILS (test code 2.05 K/UL                          

    



             = 1066)                                             

 

             ABSOLUTE LYMPHOCYTES (test code 1.82 K/UL                          

    



             = 1067)                                             

 

             ABSOLUTE MONOCYTES (test code = 0.70 K/UL                          

    



             1068)                                               

 

             ABSOLUTE EOSINOPHILS (test code 0.11 K/UL                          

    



             = 1040)                                             

 

             ABSOLUTE BASOPHILS (test code = 0.02 K/UL                          

    



             1069)                                               

 

             ABS IMMATURE GRANULOCYTES (test 0.02 K/UL                          

    



             code = 1020)                                        

 

             ABS NUCLEATED RBCS (test code = 0.00 K/UL                          

    



             85507)                                              



CBC W/AUTO [id number] 00:00:00





             Test Item    Value        Reference Range Interpretation Comments

 

             WBC (test code = 1001) 4.7 K/UL                               

 

             RBC (test code = 1002) 3.17 M/UL                              

 

             HEMOGLOBIN (test code = 1003) 8.3 G/DL                             

  

 

             HEMATOCRIT (test code = 1004) 25.8 %                               

  

 

             MCV (test code = 1005) 81.4 fL                                

 

             MCH (test code = 1006) 26.2 PG                                

 

             MCHC (test code = 1007) 32.2 G/DL                              

 

             RDW (test code = 1038) 13.6 %                                 

 

             NEUTROPHILS (test code = 1008) 43.5 %                              

   

 

             LYMPHOCYTES (test code = 1010) 38.6 %                              

   

 

             MONOCYTES (test code = 1011) 14.8 %                                

 

 

             EOSINOPHILS (test code = 1012) 2.3 %                               

   

 

             BASOPHILS (test code = 1013) 0.4 %                                 

 

 

             IMMATURE GRANYLOCYTES (test 0.4 %                                  



             code = 1036)                                        

 

             NUCLEATED RBCS (test code = 0.0 /100WBC'S                          

 



             1065)                                               

 

             PLATELET COUNT (test code = 250 K/UL                               



             1015)                                               

 

             ABSOLUTE NEUTROPHILS (test code 2.05 K/UL                          

    



             = 1066)                                             

 

             ABSOLUTE LYMPHOCYTES (test code 1.82 K/UL                          

    



             = 1067)                                             

 

             ABSOLUTE MONOCYTES (test code = 0.70 K/UL                          

    



             1068)                                               

 

             ABSOLUTE EOSINOPHILS (test code 0.11 K/UL                          

    



             = 1040)                                             

 

             ABSOLUTE BASOPHILS (test code = 0.02 K/UL                          

    



             1069)                                               

 

             ABS IMMATURE GRANULOCYTES (test 0.02 K/UL                          

    



             code = 1020)                                        

 

             ABS NUCLEATED RBCS (test code = 0.00 K/UL                          

    



             70244)                                              



COMPREHENSIVE METABOLIC [id number] 00:00:00





             Test Item    Value        Reference Range Interpretation Comments

 

             GLUCOSE (test code = 2217) 92 MG/DL                               

 

             BUN (test code = 2208) 22 MG/DL                               

 

             CREATININE (test code = 2214) 0.67 MG/DL                           

  

 

             eGFR ( CKD-EPI) (test 104 ML/MIN/1.73                          

 



             code = 55671)                                        

 

             CALC BUN/CREAT (test code = 33 RATIO                               



             2235)                                               

 

             SODIUM (test code = 2231) 145 MEQ/L                              

 

             POTASSIUM (test code = 2228) 4.8 MEQ/L                             

 

 

             CHLORIDE (test code = 2215) 104 MEQ/L                              

 

             CARBON DIOXIDE (test code = 30 MEQ/L                               



             )                                               

 

             CALCIUM (test code = 2209) 10.4 MG/DL                             

 

             PROTEIN, TOTAL (test code = 6.9 G/DL                               



             )                                               

 

             ALBUMIN (test code = 2201) 4.0 G/DL                               

 

             CALC GLOBULIN (test code = 2.9 G/DL                               



             )                                               

 

             CALC A/G RATIO (test code = 1.4 RATIO                              



             2234)                                               

 

             BILIRUBIN, TOTAL (test code = <0.2 MG/DL                           

  



             )                                               

 

             ALKALINE PHOSPHATASE (test 132 U/L                                



             code = 2204)                                        

 

             AST (test code = 2218) 29 U/L                                 

 

             ALT (test code = 2219) 32 U/L                                 



COMPREHENSIVE METABOLIC [id number] 00:00:00





             Test Item    Value        Reference Range Interpretation Comments

 

             GLUCOSE (test code = 2217) 92 MG/DL                               

 

             BUN (test code = 2208) 22 MG/DL                               

 

             CREATININE (test code = 2214) 0.67 MG/DL                           

  

 

             eGFR ( CKD-EPI) (test 104 ML/MIN/1.73                          

 



             code = 17824)                                        

 

             CALC BUN/CREAT (test code = 33 RATIO                               



             2235)                                               

 

             SODIUM (test code = 2231) 145 MEQ/L                              

 

             POTASSIUM (test code = 2228) 4.8 MEQ/L                             

 

 

             CHLORIDE (test code = 2215) 104 MEQ/L                              

 

             CARBON DIOXIDE (test code = 30 MEQ/L                               



             220)                                               

 

             CALCIUM (test code = 2209) 10.4 MG/DL                             

 

             PROTEIN, TOTAL (test code = 6.9 G/DL                               



             )                                               

 

             ALBUMIN (test code = 2201) 4.0 G/DL                               

 

             CALC GLOBULIN (test code = 2.9 G/DL                               



             2240)                                               

 

             CALC A/G RATIO (test code = 1.4 RATIO                              



             2234)                                               

 

             BILIRUBIN, TOTAL (test code = <0.2 MG/DL                           

  



             220)                                               

 

             ALKALINE PHOSPHATASE (test 132 U/L                                



             code = 2204)                                        

 

             AST (test code = 2218) 29 U/L                                 

 

             ALT (test code = 2219) 32 U/L                                 



CBC W/AUTO [id number] 00:00:00





             Test Item    Value        Reference Range Interpretation Comments

 

             WBC (test code = 1001) 4.7 K/UL                               

 

             RBC (test code = 1002) 3.17 M/UL                              

 

             HEMOGLOBIN (test code = 1003) 8.3 G/DL                             

  

 

             HEMATOCRIT (test code = 1004) 25.8 %                               

  

 

             MCV (test code = 1005) 81.4 fL                                

 

             MCH (test code = 1006) 26.2 PG                                

 

             MCHC (test code = 1007) 32.2 G/DL                              

 

             RDW (test code = 1038) 13.6 %                                 

 

             NEUTROPHILS (test code = 1008) 43.5 %                              

   

 

             LYMPHOCYTES (test code = 1010) 38.6 %                              

   

 

             MONOCYTES (test code = 1011) 14.8 %                                

 

 

             EOSINOPHILS (test code = 1012) 2.3 %                               

   

 

             BASOPHILS (test code = 1013) 0.4 %                                 

 

 

             IMMATURE GRANYLOCYTES (test 0.4 %                                  



             code = 1036)                                        

 

             NUCLEATED RBCS (test code = 0.0 /100WBC'S                          

 



             1065)                                               

 

             PLATELET COUNT (test code = 250 K/UL                               



             1015)                                               

 

             ABSOLUTE NEUTROPHILS (test code 2.05 K/UL                          

    



             = 1066)                                             

 

             ABSOLUTE LYMPHOCYTES (test code 1.82 K/UL                          

    



             = 1067)                                             

 

             ABSOLUTE MONOCYTES (test code = 0.70 K/UL                          

    



             1068)                                               

 

             ABSOLUTE EOSINOPHILS (test code 0.11 K/UL                          

    



             = 1040)                                             

 

             ABSOLUTE BASOPHILS (test code = 0.02 K/UL                          

    



             1069)                                               

 

             ABS IMMATURE GRANULOCYTES (test 0.02 K/UL                          

    



             code = 1020)                                        

 

             ABS NUCLEATED RBCS (test code = 0.00 K/UL                          

    



             46911)                                              



CBC W/AUTO [id number] 00:00:00





             Test Item    Value        Reference Range Interpretation Comments

 

             WBC (test code = 1001) 4.7 K/UL                               

 

             RBC (test code = 1002) 3.17 M/UL                              

 

             HEMOGLOBIN (test code = 1003) 8.3 G/DL                             

  

 

             HEMATOCRIT (test code = 1004) 25.8 %                               

  

 

             MCV (test code = 1005) 81.4 fL                                

 

             MCH (test code = 1006) 26.2 PG                                

 

             MCHC (test code = 1007) 32.2 G/DL                              

 

             RDW (test code = 1038) 13.6 %                                 

 

             NEUTROPHILS (test code = 1008) 43.5 %                              

   

 

             LYMPHOCYTES (test code = 1010) 38.6 %                              

   

 

             MONOCYTES (test code = 1011) 14.8 %                                

 

 

             EOSINOPHILS (test code = 1012) 2.3 %                               

   

 

             BASOPHILS (test code = 1013) 0.4 %                                 

 

 

             IMMATURE GRANYLOCYTES (test 0.4 %                                  



             code = 1036)                                        

 

             NUCLEATED RBCS (test code = 0.0 /100WBC'S                          

 



             1065)                                               

 

             PLATELET COUNT (test code = 250 K/UL                               



             1015)                                               

 

             ABSOLUTE NEUTROPHILS (test code 2.05 K/UL                          

    



             = 1066)                                             

 

             ABSOLUTE LYMPHOCYTES (test code 1.82 K/UL                          

    



             = 1067)                                             

 

             ABSOLUTE MONOCYTES (test code = 0.70 K/UL                          

    



             1068)                                               

 

             ABSOLUTE EOSINOPHILS (test code 0.11 K/UL                          

    



             = 1040)                                             

 

             ABSOLUTE BASOPHILS (test code = 0.02 K/UL                          

    



             1069)                                               

 

             ABS IMMATURE GRANULOCYTES (test 0.02 K/UL                          

    



             code = 1020)                                        

 

             ABS NUCLEATED RBCS (test code = 0.00 K/UL                          

    



             94578)                                              



CBC W/AUTO [id number] 00:00:00





             Test Item    Value        Reference Range Interpretation Comments

 

             WBC (test code = 1001) 4.7 K/UL                               

 

             RBC (test code = 1002) 3.17 M/UL                              

 

             HEMOGLOBIN (test code = 1003) 8.3 G/DL                             

  

 

             HEMATOCRIT (test code = 1004) 25.8 %                               

  

 

             MCV (test code = 1005) 81.4 fL                                

 

             MCH (test code = 1006) 26.2 PG                                

 

             MCHC (test code = 1007) 32.2 G/DL                              

 

             RDW (test code = 1038) 13.6 %                                 

 

             NEUTROPHILS (test code = 1008) 43.5 %                              

   

 

             LYMPHOCYTES (test code = 1010) 38.6 %                              

   

 

             MONOCYTES (test code = 1011) 14.8 %                                

 

 

             EOSINOPHILS (test code = 1012) 2.3 %                               

   

 

             BASOPHILS (test code = 1013) 0.4 %                                 

 

 

             IMMATURE GRANYLOCYTES (test 0.4 %                                  



             code = 1036)                                        

 

             NUCLEATED RBCS (test code = 0.0 /100WBC'S                          

 



             1065)                                               

 

             PLATELET COUNT (test code = 250 K/UL                               



             1015)                                               

 

             ABSOLUTE NEUTROPHILS (test code 2.05 K/UL                          

    



             = 1066)                                             

 

             ABSOLUTE LYMPHOCYTES (test code 1.82 K/UL                          

    



             = 1067)                                             

 

             ABSOLUTE MONOCYTES (test code = 0.70 K/UL                          

    



             1068)                                               

 

             ABSOLUTE EOSINOPHILS (test code 0.11 K/UL                          

    



             = 1040)                                             

 

             ABSOLUTE BASOPHILS (test code = 0.02 K/UL                          

    



             1069)                                               

 

             ABS IMMATURE GRANULOCYTES (test 0.02 K/UL                          

    



             code = 1020)                                        

 

             ABS NUCLEATED RBCS (test code = 0.00 K/UL                          

    



             16722)                                              



COMPREHENSIVE METABOLIC [id number] 00:00:00





             Test Item    Value        Reference Range Interpretation Comments

 

             GLUCOSE (test code = 2217) 92 MG/DL                               

 

             BUN (test code = 2208) 22 MG/DL                               

 

             CREATININE (test code = 2214) 0.67 MG/DL                           

  

 

             eGFR ( CKD-EPI) (test 104 ML/MIN/1.73                          

 



             code = 16050)                                        

 

             CALC BUN/CREAT (test code = 33 RATIO                               



             2235)                                               

 

             SODIUM (test code = 2231) 145 MEQ/L                              

 

             POTASSIUM (test code = 2228) 4.8 MEQ/L                             

 

 

             CHLORIDE (test code = 2215) 104 MEQ/L                              

 

             CARBON DIOXIDE (test code = 30 MEQ/L                               



             )                                               

 

             CALCIUM (test code = 2209) 10.4 MG/DL                             

 

             PROTEIN, TOTAL (test code = 6.9 G/DL                               



             )                                               

 

             ALBUMIN (test code = 2201) 4.0 G/DL                               

 

             CALC GLOBULIN (test code = 2.9 G/DL                               



             2240)                                               

 

             CALC A/G RATIO (test code = 1.4 RATIO                              



             2234)                                               

 

             BILIRUBIN, TOTAL (test code = <0.2 MG/DL                           

  



             )                                               

 

             ALKALINE PHOSPHATASE (test 132 U/L                                



             code = 2204)                                        

 

             AST (test code = 2218) 29 U/L                                 

 

             ALT (test code = 2219) 32 U/L                                 



COMPREHENSIVE METABOLIC [id number] 00:00:00





             Test Item    Value        Reference Range Interpretation Comments

 

             GLUCOSE (test code = 2217) 92 MG/DL                               

 

             BUN (test code = 2208) 22 MG/DL                               

 

             CREATININE (test code = 2214) 0.67 MG/DL                           

  

 

             eGFR ( CKD-EPI) (test 104 ML/MIN/1.73                          

 



             code = 67327)                                        

 

             CALC BUN/CREAT (test code = 33 RATIO                               



             2235)                                               

 

             SODIUM (test code = 2231) 145 MEQ/L                              

 

             POTASSIUM (test code = 2228) 4.8 MEQ/L                             

 

 

             CHLORIDE (test code = 2215) 104 MEQ/L                              

 

             CARBON DIOXIDE (test code = 30 MEQ/L                               



             )                                               

 

             CALCIUM (test code = 2209) 10.4 MG/DL                             

 

             PROTEIN, TOTAL (test code = 6.9 G/DL                               



             )                                               

 

             ALBUMIN (test code = 2201) 4.0 G/DL                               

 

             CALC GLOBULIN (test code = 2.9 G/DL                               



             )                                               

 

             CALC A/G RATIO (test code = 1.4 RATIO                              



             )                                               

 

             BILIRUBIN, TOTAL (test code = <0.2 MG/DL                           

  



             )                                               

 

             ALKALINE PHOSPHATASE (test 132 U/L                                



             code = 2204)                                        

 

             AST (test code = 2218) 29 U/L                                 

 

             ALT (test code = 2219) 32 U/L                                 



CBC W/AUTO [id number] 00:00:00





             Test Item    Value        Reference Range Interpretation Comments

 

             WBC (test code = 1001) 7.0 K/UL                               

 

             RBC (test code = 1002) 4.09 M/UL                              

 

             HEMOGLOBIN (test code = 1003) 11.4 G/DL                            

  

 

             HEMATOCRIT (test code = 1004) 33.6 %                               

  

 

             MCV (test code = 1005) 82.2 fL                                

 

             MCH (test code = 1006) 27.9 PG                                

 

             MCHC (test code = 1007) 33.9 G/DL                              

 

             RDW (test code = 1038) 12.8 %                                 

 

             NEUTROPHILS (test code = 1008) 57.5 %                              

   

 

             LYMPHOCYTES (test code = 1010) 26.8 %                              

   

 

             MONOCYTES (test code = 1011) 13.1 %                                

 

 

             EOSINOPHILS (test code = 1012) 1.9 %                               

   

 

             BASOPHILS (test code = 1013) 0.4 %                                 

 

 

             IMMATURE GRANULOCYTES (test 0.3 %                                  



             code = 1036)                                        

 

             NUCLEATED RBCS (test code = 0.0 /100WBC'S                          

 



             1065)                                               

 

             PLATELET COUNT (test code = 282 K/UL                               



             1015)                                               

 

             ABSOLUTE NEUTROPHILS (test code 4.03 K/UL                          

    



             = 1066)                                             

 

             ABSOLUTE LYMPHOCYTES (test code 1.88 K/UL                          

    



             = 1067)                                             

 

             ABSOLUTE MONOCYTES (test code = 0.92 K/UL                          

    



             1068)                                               

 

             ABSOLUTE EOSINOPHILS (test code 0.13 K/UL                          

    



             = 1040)                                             

 

             ABSOLUTE BASOPHILS (test code = 0.03 K/UL                          

    



             1069)                                               

 

             ABS IMMATURE GRANULOCYTES (test 0.02 K/UL                          

    



             code = 1020)                                        

 

             ABS NUCLEATED RBCS (test code = 0.00 K/UL                          

    



             20487)                                              



CBC W/AUTO [id number] 00:00:00





             Test Item    Value        Reference Range Interpretation Comments

 

             WBC (test code = 1001) 7.0 K/UL                               

 

             RBC (test code = 1002) 4.09 M/UL                              

 

             HEMOGLOBIN (test code = 1003) 11.4 G/DL                            

  

 

             HEMATOCRIT (test code = 1004) 33.6 %                               

  

 

             MCV (test code = 1005) 82.2 fL                                

 

             MCH (test code = 1006) 27.9 PG                                

 

             MCHC (test code = 1007) 33.9 G/DL                              

 

             RDW (test code = 1038) 12.8 %                                 

 

             NEUTROPHILS (test code = 1008) 57.5 %                              

   

 

             LYMPHOCYTES (test code = 1010) 26.8 %                              

   

 

             MONOCYTES (test code = 1011) 13.1 %                                

 

 

             EOSINOPHILS (test code = 1012) 1.9 %                               

   

 

             BASOPHILS (test code = 1013) 0.4 %                                 

 

 

             IMMATURE GRANULOCYTES (test 0.3 %                                  



             code = 1036)                                        

 

             NUCLEATED RBCS (test code = 0.0 /100WBC'S                          

 



             1065)                                               

 

             PLATELET COUNT (test code = 282 K/UL                               



             1015)                                               

 

             ABSOLUTE NEUTROPHILS (test code 4.03 K/UL                          

    



             = 1066)                                             

 

             ABSOLUTE LYMPHOCYTES (test code 1.88 K/UL                          

    



             = 1067)                                             

 

             ABSOLUTE MONOCYTES (test code = 0.92 K/UL                          

    



             1068)                                               

 

             ABSOLUTE EOSINOPHILS (test code 0.13 K/UL                          

    



             = 1040)                                             

 

             ABSOLUTE BASOPHILS (test code = 0.03 K/UL                          

    



             1069)                                               

 

             ABS IMMATURE GRANULOCYTES (test 0.02 K/UL                          

    



             code = 1020)                                        

 

             ABS NUCLEATED RBCS (test code = 0.00 K/UL                          

    



             26195)                                              



COMPREHENSIVE METABOLIC [id number] 00:00:00





             Test Item    Value        Reference Range Interpretation Comments

 

             GLUCOSE (test code = 2217) 129 MG/DL                              

 

             BUN (test code = 2208) 13 MG/DL                               

 

             CREATININE (test code = 2214) 0.45 MG/DL                           

  

 

             eGFR  AMER. (test code 133 ML/MIN/1.73                      

     



             = 19072)                                            

 

             eGFR NON- AMER. (test 114 ML/MIN/1.73                       

    



             code = 77851)                                        

 

             CALC BUN/CREAT (test code = 29 RATIO                               



             2235)                                               

 

             SODIUM (test code = 2231) 142 MEQ/L                              

 

             POTASSIUM (test code = 2228) 4.4 MEQ/L                             

 

 

             CHLORIDE (test code = 2215) 104 MEQ/L                              

 

             CARBON DIOXIDE (test code = 26 MEQ/L                               



             2206)                                               

 

             CALCIUM (test code = 2209) 9.8 MG/DL                              

 

             PROTEIN, TOTAL (test code = 7.1 G/DL                               



             222)                                               

 

             ALBUMIN (test code = 2201) 3.9 G/DL                               

 

             CALC GLOBULIN (test code = 3.2 G/DL                               



             2240)                                               

 

             CALC A/G RATIO (test code = 1.2 RATIO                              



             2234)                                               

 

             BILIRUBIN, TOTAL (test code = 0.2 MG/DL                            

  



             )                                               

 

             ALKALINE PHOSPHATASE (test 99 U/L                                 



             code = 2204)                                        

 

             AST (test code = 2218) 17 U/L                                 

 

             ALT (test code = 2219) 22 U/L                                 



[id number] 00:00:00





             Test Item    Value        Reference Range Interpretation Comments

 

             CKMB (test code = 13671) 1.2 NG/ML                              



[id number] 00:00:00





             Test Item    Value        Reference Range Interpretation Comments

 

             TROPONIN T (test code = 4017) <0.010 UG/L                          

  



CBC W/AUTO [id number] 00:00:00





             Test Item    Value        Reference Range Interpretation Comments

 

             WBC (test code = 1001) 7.0 K/UL                               

 

             RBC (test code = 1002) 4.09 M/UL                              

 

             HEMOGLOBIN (test code = 1003) 11.4 G/DL                            

  

 

             HEMATOCRIT (test code = 1004) 33.6 %                               

  

 

             MCV (test code = 1005) 82.2 fL                                

 

             MCH (test code = 1006) 27.9 PG                                

 

             MCHC (test code = 1007) 33.9 G/DL                              

 

             RDW (test code = 1038) 12.8 %                                 

 

             NEUTROPHILS (test code = 1008) 57.5 %                              

   

 

             LYMPHOCYTES (test code = 1010) 26.8 %                              

   

 

             MONOCYTES (test code = 1011) 13.1 %                                

 

 

             EOSINOPHILS (test code = 1012) 1.9 %                               

   

 

             BASOPHILS (test code = 1013) 0.4 %                                 

 

 

             IMMATURE GRANULOCYTES (test 0.3 %                                  



             code = 1036)                                        

 

             NUCLEATED RBCS (test code = 0.0 /100WBC'S                          

 



             1065)                                               

 

             PLATELET COUNT (test code = 282 K/UL                               



             1015)                                               

 

             ABSOLUTE NEUTROPHILS (test code 4.03 K/UL                          

    



             = 1066)                                             

 

             ABSOLUTE LYMPHOCYTES (test code 1.88 K/UL                          

    



             = 1067)                                             

 

             ABSOLUTE MONOCYTES (test code = 0.92 K/UL                          

    



             1068)                                               

 

             ABSOLUTE EOSINOPHILS (test code 0.13 K/UL                          

    



             = 1040)                                             

 

             ABSOLUTE BASOPHILS (test code = 0.03 K/UL                          

    



             1069)                                               

 

             ABS IMMATURE GRANULOCYTES (test 0.02 K/UL                          

    



             code = 1020)                                        

 

             ABS NUCLEATED RBCS (test code = 0.00 K/UL                          

    



             03037)                                              



CBC W/AUTO [id number] 00:00:00





             Test Item    Value        Reference Range Interpretation Comments

 

             WBC (test code = 1001) 7.0 K/UL                               

 

             RBC (test code = 1002) 4.09 M/UL                              

 

             HEMOGLOBIN (test code = 1003) 11.4 G/DL                            

  

 

             HEMATOCRIT (test code = 1004) 33.6 %                               

  

 

             MCV (test code = 1005) 82.2 fL                                

 

             MCH (test code = 1006) 27.9 PG                                

 

             MCHC (test code = 1007) 33.9 G/DL                              

 

             RDW (test code = 1038) 12.8 %                                 

 

             NEUTROPHILS (test code = 1008) 57.5 %                              

   

 

             LYMPHOCYTES (test code = 1010) 26.8 %                              

   

 

             MONOCYTES (test code = 1011) 13.1 %                                

 

 

             EOSINOPHILS (test code = 1012) 1.9 %                               

   

 

             BASOPHILS (test code = 1013) 0.4 %                                 

 

 

             IMMATURE GRANULOCYTES (test 0.3 %                                  



             code = 1036)                                        

 

             NUCLEATED RBCS (test code = 0.0 /100WBC'S                          

 



             1065)                                               

 

             PLATELET COUNT (test code = 282 K/UL                               



             1015)                                               

 

             ABSOLUTE NEUTROPHILS (test code 4.03 K/UL                          

    



             = 1066)                                             

 

             ABSOLUTE LYMPHOCYTES (test code 1.88 K/UL                          

    



             = 1067)                                             

 

             ABSOLUTE MONOCYTES (test code = 0.92 K/UL                          

    



             1068)                                               

 

             ABSOLUTE EOSINOPHILS (test code 0.13 K/UL                          

    



             = 1040)                                             

 

             ABSOLUTE BASOPHILS (test code = 0.03 K/UL                          

    



             1069)                                               

 

             ABS IMMATURE GRANULOCYTES (test 0.02 K/UL                          

    



             code = 1020)                                        

 

             ABS NUCLEATED RBCS (test code = 0.00 K/UL                          

    



             12799)                                              



COMPREHENSIVE METABOLIC [id number] 00:00:00





             Test Item    Value        Reference Range Interpretation Comments

 

             GLUCOSE (test code = 2217) 129 MG/DL                              

 

             BUN (test code = 2208) 13 MG/DL                               

 

             CREATININE (test code = 2214) 0.45 MG/DL                           

  

 

             eGFR  AMER. (test code 133 ML/MIN/1.73                      

     



             = 24705)                                            

 

             eGFR NON- AMER. (test 114 ML/MIN/1.73                       

    



             code = 16652)                                        

 

             CALC BUN/CREAT (test code = 29 RATIO                               



             2235)                                               

 

             SODIUM (test code = 2231) 142 MEQ/L                              

 

             POTASSIUM (test code = 2228) 4.4 MEQ/L                             

 

 

             CHLORIDE (test code = 2215) 104 MEQ/L                              

 

             CARBON DIOXIDE (test code = 26 MEQ/L                               



             )                                               

 

             CALCIUM (test code = 2209) 9.8 MG/DL                              

 

             PROTEIN, TOTAL (test code = 7.1 G/DL                               



             )                                               

 

             ALBUMIN (test code = 2201) 3.9 G/DL                               

 

             CALC GLOBULIN (test code = 3.2 G/DL                               



             )                                               

 

             CALC A/G RATIO (test code = 1.2 RATIO                              



             )                                               

 

             BILIRUBIN, TOTAL (test code = 0.2 MG/DL                            

  



             )                                               

 

             ALKALINE PHOSPHATASE (test 99 U/L                                 



             code = 220)                                        

 

             AST (test code = 2218) 17 U/L                                 

 

             ALT (test code = 221) 22 U/L                                 



[id number] 00:00:00





             Test Item    Value        Reference Range Interpretation Comments

 

             CKMB (test code = 19082) 1.2 NG/ML                              



TROPONIN -[id number] 00:00:00





             Test Item    Value        Reference Range Interpretation Comments

 

             TROPONIN T (test code = 4017) <0.010 UG/L                          

  



CBC W/AUTO [id number] 00:00:00





             Test Item    Value        Reference Range Interpretation Comments

 

             WBC (test code = 1001) 7.0 K/UL                               

 

             RBC (test code = 1002) 4.09 M/UL                              

 

             HEMOGLOBIN (test code = 1003) 11.4 G/DL                            

  

 

             HEMATOCRIT (test code = 1004) 33.6 %                               

  

 

             MCV (test code = 1005) 82.2 fL                                

 

             MCH (test code = 1006) 27.9 PG                                

 

             MCHC (test code = 1007) 33.9 G/DL                              

 

             RDW (test code = 1038) 12.8 %                                 

 

             NEUTROPHILS (test code = 1008) 57.5 %                              

   

 

             LYMPHOCYTES (test code = 1010) 26.8 %                              

   

 

             MONOCYTES (test code = 1011) 13.1 %                                

 

 

             EOSINOPHILS (test code = 1012) 1.9 %                               

   

 

             BASOPHILS (test code = 1013) 0.4 %                                 

 

 

             IMMATURE GRANULOCYTES (test 0.3 %                                  



             code = 1036)                                        

 

             NUCLEATED RBCS (test code = 0.0 /100WBC'S                          

 



             1065)                                               

 

             PLATELET COUNT (test code = 282 K/UL                               



             1015)                                               

 

             ABSOLUTE NEUTROPHILS (test code 4.03 K/UL                          

    



             = 1066)                                             

 

             ABSOLUTE LYMPHOCYTES (test code 1.88 K/UL                          

    



             = 1067)                                             

 

             ABSOLUTE MONOCYTES (test code = 0.92 K/UL                          

    



             1068)                                               

 

             ABSOLUTE EOSINOPHILS (test code 0.13 K/UL                          

    



             = 1040)                                             

 

             ABSOLUTE BASOPHILS (test code = 0.03 K/UL                          

    



             1069)                                               

 

             ABS IMMATURE GRANULOCYTES (test 0.02 K/UL                          

    



             code = 1020)                                        

 

             ABS NUCLEATED RBCS (test code = 0.00 K/UL                          

    



             05099)                                              



CBC W/AUTO [id number] 00:00:00





             Test Item    Value        Reference Range Interpretation Comments

 

             WBC (test code = 1001) 7.0 K/UL                               

 

             RBC (test code = 1002) 4.09 M/UL                              

 

             HEMOGLOBIN (test code = 1003) 11.4 G/DL                            

  

 

             HEMATOCRIT (test code = 1004) 33.6 %                               

  

 

             MCV (test code = 1005) 82.2 fL                                

 

             MCH (test code = 1006) 27.9 PG                                

 

             MCHC (test code = 1007) 33.9 G/DL                              

 

             RDW (test code = 1038) 12.8 %                                 

 

             NEUTROPHILS (test code = 1008) 57.5 %                              

   

 

             LYMPHOCYTES (test code = 1010) 26.8 %                              

   

 

             MONOCYTES (test code = 1011) 13.1 %                                

 

 

             EOSINOPHILS (test code = 1012) 1.9 %                               

   

 

             BASOPHILS (test code = 1013) 0.4 %                                 

 

 

             IMMATURE GRANULOCYTES (test 0.3 %                                  



             code = 1036)                                        

 

             NUCLEATED RBCS (test code = 0.0 /100WBC'S                          

 



             1065)                                               

 

             PLATELET COUNT (test code = 282 K/UL                               



             1015)                                               

 

             ABSOLUTE NEUTROPHILS (test code 4.03 K/UL                          

    



             = 1066)                                             

 

             ABSOLUTE LYMPHOCYTES (test code 1.88 K/UL                          

    



             = 1067)                                             

 

             ABSOLUTE MONOCYTES (test code = 0.92 K/UL                          

    



             1068)                                               

 

             ABSOLUTE EOSINOPHILS (test code 0.13 K/UL                          

    



             = 1040)                                             

 

             ABSOLUTE BASOPHILS (test code = 0.03 K/UL                          

    



             1069)                                               

 

             ABS IMMATURE GRANULOCYTES (test 0.02 K/UL                          

    



             code = 1020)                                        

 

             ABS NUCLEATED RBCS (test code = 0.00 K/UL                          

    



             42792)                                              



CBC W/AUTO [id number] 00:00:00





             Test Item    Value        Reference Range Interpretation Comments

 

             WBC (test code = 1001) 7.0 K/UL                               

 

             RBC (test code = 1002) 4.09 M/UL                              

 

             HEMOGLOBIN (test code = 1003) 11.4 G/DL                            

  

 

             HEMATOCRIT (test code = 1004) 33.6 %                               

  

 

             MCV (test code = 1005) 82.2 fL                                

 

             MCH (test code = 1006) 27.9 PG                                

 

             MCHC (test code = 1007) 33.9 G/DL                              

 

             RDW (test code = 1038) 12.8 %                                 

 

             NEUTROPHILS (test code = 1008) 57.5 %                              

   

 

             LYMPHOCYTES (test code = 1010) 26.8 %                              

   

 

             MONOCYTES (test code = 1011) 13.1 %                                

 

 

             EOSINOPHILS (test code = 1012) 1.9 %                               

   

 

             BASOPHILS (test code = 1013) 0.4 %                                 

 

 

             IMMATURE GRANULOCYTES (test 0.3 %                                  



             code = 1036)                                        

 

             NUCLEATED RBCS (test code = 0.0 /100WBC'S                          

 



             1065)                                               

 

             PLATELET COUNT (test code = 282 K/UL                               



             1015)                                               

 

             ABSOLUTE NEUTROPHILS (test code 4.03 K/UL                          

    



             = 1066)                                             

 

             ABSOLUTE LYMPHOCYTES (test code 1.88 K/UL                          

    



             = 1067)                                             

 

             ABSOLUTE MONOCYTES (test code = 0.92 K/UL                          

    



             1068)                                               

 

             ABSOLUTE EOSINOPHILS (test code 0.13 K/UL                          

    



             = 1040)                                             

 

             ABSOLUTE BASOPHILS (test code = 0.03 K/UL                          

    



             1069)                                               

 

             ABS IMMATURE GRANULOCYTES (test 0.02 K/UL                          

    



             code = 1020)                                        

 

             ABS NUCLEATED RBCS (test code = 0.00 K/UL                          

    



             84834)                                              



COMPREHENSIVE METABOLIC [id number] 00:00:00





             Test Item    Value        Reference Range Interpretation Comments

 

             GLUCOSE (test code = 2217) 129 MG/DL                              

 

             BUN (test code = 2208) 13 MG/DL                               

 

             CREATININE (test code = 2214) 0.45 MG/DL                           

  

 

             eGFR  AMER. (test code 133 ML/MIN/1.73                      

     



             = 85721)                                            

 

             eGFR NON- AMER. (test 114 ML/MIN/1.73                       

    



             code = 65885)                                        

 

             CALC BUN/CREAT (test code = 29 RATIO                               



             2235)                                               

 

             SODIUM (test code = 2231) 142 MEQ/L                              

 

             POTASSIUM (test code = 2228) 4.4 MEQ/L                             

 

 

             CHLORIDE (test code = 2215) 104 MEQ/L                              

 

             CARBON DIOXIDE (test code = 26 MEQ/L                               



             220)                                               

 

             CALCIUM (test code = 2209) 9.8 MG/DL                              

 

             PROTEIN, TOTAL (test code = 7.1 G/DL                               



             )                                               

 

             ALBUMIN (test code = 2201) 3.9 G/DL                               

 

             CALC GLOBULIN (test code = 3.2 G/DL                               



             2240)                                               

 

             CALC A/G RATIO (test code = 1.2 RATIO                              



             223)                                               

 

             BILIRUBIN, TOTAL (test code = 0.2 MG/DL                            

  



             )                                               

 

             ALKALINE PHOSPHATASE (test 99 U/L                                 



             code = 2204)                                        

 

             AST (test code = 2218) 17 U/L                                 

 

             ALT (test code = 2219) 22 U/L                                 



COMPREHENSIVE METABOLIC [id number] 00:00:00





             Test Item    Value        Reference Range Interpretation Comments

 

             GLUCOSE (test code = 2217) 129 MG/DL                              

 

             BUN (test code = 2208) 13 MG/DL                               

 

             CREATININE (test code = 2214) 0.45 MG/DL                           

  

 

             eGFR  AMER. (test code 133 ML/MIN/1.73                      

     



             = 90260)                                            

 

             eGFR NON- AMER. (test 114 ML/MIN/1.73                       

    



             code = 00563)                                        

 

             CALC BUN/CREAT (test code = 29 RATIO                               



             2235)                                               

 

             SODIUM (test code = 2231) 142 MEQ/L                              

 

             POTASSIUM (test code = 2228) 4.4 MEQ/L                             

 

 

             CHLORIDE (test code = 2215) 104 MEQ/L                              

 

             CARBON DIOXIDE (test code = 26 MEQ/L                               



             )                                               

 

             CALCIUM (test code = 2209) 9.8 MG/DL                              

 

             PROTEIN, TOTAL (test code = 7.1 G/DL                               



             )                                               

 

             ALBUMIN (test code = 2201) 3.9 G/DL                               

 

             CALC GLOBULIN (test code = 3.2 G/DL                               



             0)                                               

 

             CALC A/G RATIO (test code = 1.2 RATIO                              



             )                                               

 

             BILIRUBIN, TOTAL (test code = 0.2 MG/DL                            

  



             )                                               

 

             ALKALINE PHOSPHATASE (test 99 U/L                                 



             code = 2204)                                        

 

             AST (test code = 2218) 17 U/L                                 

 

             ALT (test code = 2219) 22 U/L                                 



[id number] 00:00:00





             Test Item    Value        Reference Range Interpretation Comments

 

             CKMB (test code = 35733) 1.2 NG/ML                              



[id number] 00:00:00





             Test Item    Value        Reference Range Interpretation Comments

 

             CKMB (test code = 85073) 1.2 NG/ML                              



TROPONIN -[id number] 00:00:00





             Test Item    Value        Reference Range Interpretation Comments

 

             TROPONIN T (test code = 4017) <0.010 UG/L                          

  



TROPONIN -[id number] 00:00:00





             Test Item    Value        Reference Range Interpretation Comments

 

             TROPONIN T (test code = 4017) <0.010 UG/L                          

  



CBC W/AUTO [id number] 00:00:00





             Test Item    Value        Reference Range Interpretation Comments

 

             WBC (test code = 1001) 7.0 K/UL                               

 

             RBC (test code = 1002) 4.09 M/UL                              

 

             HEMOGLOBIN (test code = 1003) 11.4 G/DL                            

  

 

             HEMATOCRIT (test code = 1004) 33.6 %                               

  

 

             MCV (test code = 1005) 82.2 fL                                

 

             MCH (test code = 1006) 27.9 PG                                

 

             MCHC (test code = 1007) 33.9 G/DL                              

 

             RDW (test code = 1038) 12.8 %                                 

 

             NEUTROPHILS (test code = 1008) 57.5 %                              

   

 

             LYMPHOCYTES (test code = 1010) 26.8 %                              

   

 

             MONOCYTES (test code = 1011) 13.1 %                                

 

 

             EOSINOPHILS (test code = 1012) 1.9 %                               

   

 

             BASOPHILS (test code = 1013) 0.4 %                                 

 

 

             IMMATURE GRANULOCYTES (test 0.3 %                                  



             code = 1036)                                        

 

             NUCLEATED RBCS (test code = 0.0 /100WBC'S                          

 



             1065)                                               

 

             PLATELET COUNT (test code = 282 K/UL                               



             1015)                                               

 

             ABSOLUTE NEUTROPHILS (test code 4.03 K/UL                          

    



             = 1066)                                             

 

             ABSOLUTE LYMPHOCYTES (test code 1.88 K/UL                          

    



             = 1067)                                             

 

             ABSOLUTE MONOCYTES (test code = 0.92 K/UL                          

    



             1068)                                               

 

             ABSOLUTE EOSINOPHILS (test code 0.13 K/UL                          

    



             = 1040)                                             

 

             ABSOLUTE BASOPHILS (test code = 0.03 K/UL                          

    



             1069)                                               

 

             ABS IMMATURE GRANULOCYTES (test 0.02 K/UL                          

    



             code = 1020)                                        

 

             ABS NUCLEATED RBCS (test code = 0.00 K/UL                          

    



             02709)                                              



CBC W/AUTO [id number] 00:00:00





             Test Item    Value        Reference Range Interpretation Comments

 

             WBC (test code = 1001) 7.0 K/UL                               

 

             RBC (test code = 1002) 4.09 M/UL                              

 

             HEMOGLOBIN (test code = 1003) 11.4 G/DL                            

  

 

             HEMATOCRIT (test code = 1004) 33.6 %                               

  

 

             MCV (test code = 1005) 82.2 fL                                

 

             MCH (test code = 1006) 27.9 PG                                

 

             MCHC (test code = 1007) 33.9 G/DL                              

 

             RDW (test code = 1038) 12.8 %                                 

 

             NEUTROPHILS (test code = 1008) 57.5 %                              

   

 

             LYMPHOCYTES (test code = 1010) 26.8 %                              

   

 

             MONOCYTES (test code = 1011) 13.1 %                                

 

 

             EOSINOPHILS (test code = 1012) 1.9 %                               

   

 

             BASOPHILS (test code = 1013) 0.4 %                                 

 

 

             IMMATURE GRANULOCYTES (test 0.3 %                                  



             code = 1036)                                        

 

             NUCLEATED RBCS (test code = 0.0 /100WBC'S                          

 



             1065)                                               

 

             PLATELET COUNT (test code = 282 K/UL                               



             1015)                                               

 

             ABSOLUTE NEUTROPHILS (test code 4.03 K/UL                          

    



             = 1066)                                             

 

             ABSOLUTE LYMPHOCYTES (test code 1.88 K/UL                          

    



             = 1067)                                             

 

             ABSOLUTE MONOCYTES (test code = 0.92 K/UL                          

    



             1068)                                               

 

             ABSOLUTE EOSINOPHILS (test code 0.13 K/UL                          

    



             = 1040)                                             

 

             ABSOLUTE BASOPHILS (test code = 0.03 K/UL                          

    



             1069)                                               

 

             ABS IMMATURE GRANULOCYTES (test 0.02 K/UL                          

    



             code = 1020)                                        

 

             ABS NUCLEATED RBCS (test code = 0.00 K/UL                          

    



             75761)                                              



CBC W/AUTO [id number] 00:00:00





             Test Item    Value        Reference Range Interpretation Comments

 

             WBC (test code = 1001) 7.0 K/UL                               

 

             RBC (test code = 1002) 4.09 M/UL                              

 

             HEMOGLOBIN (test code = 1003) 11.4 G/DL                            

  

 

             HEMATOCRIT (test code = 1004) 33.6 %                               

  

 

             MCV (test code = 1005) 82.2 fL                                

 

             MCH (test code = 1006) 27.9 PG                                

 

             MCHC (test code = 1007) 33.9 G/DL                              

 

             RDW (test code = 1038) 12.8 %                                 

 

             NEUTROPHILS (test code = 1008) 57.5 %                              

   

 

             LYMPHOCYTES (test code = 1010) 26.8 %                              

   

 

             MONOCYTES (test code = 1011) 13.1 %                                

 

 

             EOSINOPHILS (test code = 1012) 1.9 %                               

   

 

             BASOPHILS (test code = 1013) 0.4 %                                 

 

 

             IMMATURE GRANULOCYTES (test 0.3 %                                  



             code = 1036)                                        

 

             NUCLEATED RBCS (test code = 0.0 /100WBC'S                          

 



             1065)                                               

 

             PLATELET COUNT (test code = 282 K/UL                               



             1015)                                               

 

             ABSOLUTE NEUTROPHILS (test code 4.03 K/UL                          

    



             = 1066)                                             

 

             ABSOLUTE LYMPHOCYTES (test code 1.88 K/UL                          

    



             = 1067)                                             

 

             ABSOLUTE MONOCYTES (test code = 0.92 K/UL                          

    



             1068)                                               

 

             ABSOLUTE EOSINOPHILS (test code 0.13 K/UL                          

    



             = 1040)                                             

 

             ABSOLUTE BASOPHILS (test code = 0.03 K/UL                          

    



             1069)                                               

 

             ABS IMMATURE GRANULOCYTES (test 0.02 K/UL                          

    



             code = 1020)                                        

 

             ABS NUCLEATED RBCS (test code = 0.00 K/UL                          

    



             99483)                                              



COMPREHENSIVE METABOLIC [id number] 00:00:00





             Test Item    Value        Reference Range Interpretation Comments

 

             GLUCOSE (test code = 2217) 129 MG/DL                              

 

             BUN (test code = 2208) 13 MG/DL                               

 

             CREATININE (test code = 2214) 0.45 MG/DL                           

  

 

             eGFR  AMER. (test code 133 ML/MIN/1.73                      

     



             = 78278)                                            

 

             eGFR NON- AMER. (test 114 ML/MIN/1.73                       

    



             code = 25227)                                        

 

             CALC BUN/CREAT (test code = 29 RATIO                               



             2235)                                               

 

             SODIUM (test code = 2231) 142 MEQ/L                              

 

             POTASSIUM (test code = 2228) 4.4 MEQ/L                             

 

 

             CHLORIDE (test code = 2215) 104 MEQ/L                              

 

             CARBON DIOXIDE (test code = 26 MEQ/L                               



             )                                               

 

             CALCIUM (test code = 2209) 9.8 MG/DL                              

 

             PROTEIN, TOTAL (test code = 7.1 G/DL                               



             )                                               

 

             ALBUMIN (test code = 2201) 3.9 G/DL                               

 

             CALC GLOBULIN (test code = 3.2 G/DL                               



             2240)                                               

 

             CALC A/G RATIO (test code = 1.2 RATIO                              



             )                                               

 

             BILIRUBIN, TOTAL (test code = 0.2 MG/DL                            

  



             )                                               

 

             ALKALINE PHOSPHATASE (test 99 U/L                                 



             code = 2204)                                        

 

             AST (test code = 2218) 17 U/L                                 

 

             ALT (test code = 2219) 22 U/L                                 



COMPREHENSIVE METABOLIC [id number] 00:00:00





             Test Item    Value        Reference Range Interpretation Comments

 

             GLUCOSE (test code = 2217) 129 MG/DL                              

 

             BUN (test code = 2208) 13 MG/DL                               

 

             CREATININE (test code = 2214) 0.45 MG/DL                           

  

 

             eGFR  AMER. (test code 133 ML/MIN/1.73                      

     



             = 90666)                                            

 

             eGFR NON- AMER. (test 114 ML/MIN/1.73                       

    



             code = 58131)                                        

 

             CALC BUN/CREAT (test code = 29 RATIO                               



             2235)                                               

 

             SODIUM (test code = 2231) 142 MEQ/L                              

 

             POTASSIUM (test code = 2228) 4.4 MEQ/L                             

 

 

             CHLORIDE (test code = 2215) 104 MEQ/L                              

 

             CARBON DIOXIDE (test code = 26 MEQ/L                               



             220)                                               

 

             CALCIUM (test code = 2209) 9.8 MG/DL                              

 

             PROTEIN, TOTAL (test code = 7.1 G/DL                               



             )                                               

 

             ALBUMIN (test code = 2201) 3.9 G/DL                               

 

             CALC GLOBULIN (test code = 3.2 G/DL                               



             2240)                                               

 

             CALC A/G RATIO (test code = 1.2 RATIO                              



             2234)                                               

 

             BILIRUBIN, TOTAL (test code = 0.2 MG/DL                            

  



             7)                                               

 

             ALKALINE PHOSPHATASE (test 99 U/L                                 



             code = 2204)                                        

 

             AST (test code = 2218) 17 U/L                                 

 

             ALT (test code = 2219) 22 U/L                                 



[id number] 00:00:00





             Test Item    Value        Reference Range Interpretation Comments

 

             CKMB (test code = 37975) 1.2 NG/ML                              



[id number] 00:00:00





             Test Item    Value        Reference Range Interpretation Comments

 

             CKMB (test code = ) 1.2 NG/ML                              



TROPONIN -52-86 00:00:00





             Test Item    Value        Reference Range Interpretation Comments

 

             TROPONIN T (test code = 4017) <0.010 UG/L                          

  



TROPONIN -87-10 00:00:00





             Test Item    Value        Reference Range Interpretation Comments

 

             TROPONIN T (test code = 4017) <0.010 UG/L                          

  



SARS-CoV-2 (COVID-19) by RT-PCR (HIGH RISK)2021 00:00:00





             Test Item    Value        Reference Range Interpretation Comments

 

             SARS-CoV-2 INTERPRETATION (test NEGATIVE                           

    



             code = 35142)                                        

 

             SOURCE (test code = 44566) NOT SPECIFIED                           



SARS-CoV-2 (COVID-19) by RT-PCR (HIGH RISK)2021 00:00:00





             Test Item    Value        Reference Range Interpretation Comments

 

             SARS-CoV-2 INTERPRETATION (test NEGATIVE                           

    



             code = 24320)                                        

 

             SOURCE (test code = 74091) NOT SPECIFIED                           



SARS-CoV-2 (COVID-19) by RT-PCR (HIGH RISK)2021 00:00:00





             Test Item    Value        Reference Range Interpretation Comments

 

             SARS-CoV-2 INTERPRETATION (test NEGATIVE                           

    



             code = 68175)                                        

 

             SOURCE (test code = 57362) NOT SPECIFIED                           



SARS-CoV-2 (COVID-19) by RT-PCR (HIGH RISK)2021 00:00:00





             Test Item    Value        Reference Range Interpretation Comments

 

             SARS-CoV-2 INTERPRETATION (test NEGATIVE                           

    



             code = 60545)                                        

 

             SOURCE (test code = 67706) NOT SPECIFIED                           



SARS-CoV-2 (COVID-19) by RT-PCR (HIGH RISK)2021 00:00:00





             Test Item    Value        Reference Range Interpretation Comments

 

             SARS-CoV-2 INTERPRETATION (test NEGATIVE                           

    



             code = 60791)                                        

 

             SOURCE (test code = 50970) NOT SPECIFIED                           



SARS-CoV-2 (COVID-19) by RT-PCR (HIGH RISK)2021 00:00:00





             Test Item    Value        Reference Range Interpretation Comments

 

             SARS-CoV-2 INTERPRETATION (test NEGATIVE                           

    



             code = 88278)                                        

 

             SOURCE (test code = 19635) NOT SPECIFIED                           



PAP TEST, THINPREP, [id number] 00:00:00





             Test Item    Value        Reference Range Interpretation Comments

 

             SOURCE: (test code = Cervical/Vaginal                           



             8001)                                               

 

             SLIDES: (test code = 1                                      



             8011)                                               

 

             LMP: (test code = 8021) SEE NOTE                               

 

             SPECIMEN ADEQUACY: (test (NOTE)                                 



             code = 24509)                                        

 

             INTERPRETATION: (test NILM/NO EPITH.                           



             code = 93971) ABNORMALITY;SEE BELOW                           

 

             CYTOTECHNOLOGIST: (test Steve BrooksCT(ASCP)                       

    



             code = 8101)                                        

 

             LOCATION: (test code = (NOTE)                                 



             71134)                                              

 

             CPT: (test code = 8140) 10536                                  



PAP TEST, THINPREP, [id number] 00:00:00





             Test Item    Value        Reference Range Interpretation Comments

 

             SOURCE: (test code = Cervical/Vaginal                           



             8001)                                               

 

             SLIDES: (test code = 1                                      



             8011)                                               

 

             LMP: (test code = 8021) SEE NOTE                               

 

             SPECIMEN ADEQUACY: (test (NOTE)                                 



             code = 23849)                                        

 

             INTERPRETATION: (test NILM/NO EPITH.                           



             code = 44357) ABNORMALITY;SEE BELOW                           

 

             CYTOTECHNOLOGIST: (test MARIE Benítez(ASCP)                       

    



             code = 8101)                                        

 

             LOCATION: (test code = (NOTE)                                 



             63439)                                              

 

             CPT: (test code = 8140) 19514                                  



PAP TEST, THINPREP, [id number] 00:00:00





             Test Item    Value        Reference Range Interpretation Comments

 

             SOURCE: (test code = Cervical/Vaginal                           



             8001)                                               

 

             SLIDES: (test code = 1                                      



             8011)                                               

 

             LMP: (test code = 8021) SEE NOTE                               

 

             SPECIMEN ADEQUACY: (test (NOTE)                                 



             code = 96358)                                        

 

             INTERPRETATION: (test NILM/NO EPITH.                           



             code = 45544) ABNORMALITY;SEE BELOW                           

 

             CYTOTECHNOLOGIST: (test MARIE Benítez(ASCP)                       

    



             code = 8101)                                        

 

             LOCATION: (test code = (NOTE)                                 



             24334)                                              

 

             CPT: (test code = 8140) 12035                                  



PAP TEST, THINPREP, [id number] 00:00:00





             Test Item    Value        Reference Range Interpretation Comments

 

             SOURCE: (test code = Cervical/Vaginal                           



             8001)                                               

 

             SLIDES: (test code = 1                                      



             8011)                                               

 

             LMP: (test code = 8021) SEE NOTE                               

 

             SPECIMEN ADEQUACY: (test (NOTE)                                 



             code = 44202)                                        

 

             INTERPRETATION: (test NILM/NO EPITH.                           



             code = 73257) ABNORMALITY;SEE BELOW                           

 

             CYTOTECHNOLOGIST: (test Steve BrooksCT(ASCP)                       

    



             code = 8101)                                        

 

             LOCATION: (test code = (NOTE)                                 



             75134)                                              

 

             CPT: (test code = 8140) 30243                                  



PAP TEST, THINPREP, [id number] 00:00:00





             Test Item    Value        Reference Range Interpretation Comments

 

             SOURCE: (test code = Cervical/Vaginal                           



             8001)                                               

 

             SLIDES: (test code = 1                                      



             8011)                                               

 

             LMP: (test code = 8021) SEE NOTE                               

 

             SPECIMEN ADEQUACY: (test (NOTE)                                 



             code = 68610)                                        

 

             INTERPRETATION: (test NILM/NO EPITH.                           



             code = 89039) ABNORMALITY;SEE BELOW                           

 

             CYTOTECHNOLOGIST: (test MARIE Benítez(ASCP)                       

    



             code = 8101)                                        

 

             LOCATION: (test code = (NOTE)                                 



             99841)                                              

 

             CPT: (test code = 8140) 11556                                  



PAP TEST, THINPREP, [id number] 00:00:00





             Test Item    Value        Reference Range Interpretation Comments

 

             SOURCE: (test code = Cervical/Vaginal                           



             8001)                                               

 

             SLIDES: (test code = 1                                      



             8011)                                               

 

             LMP: (test code = 8021) SEE NOTE                               

 

             SPECIMEN ADEQUACY: (test (NOTE)                                 



             code = 62093)                                        

 

             INTERPRETATION: (test NILM/NO EPITH.                           



             code = 78472) ABNORMALITY;SEE BELOW                           

 

             CYTOTECHNOLOGIST: (test Steve BrooksCT(ASCP)                       

    



             code = 8101)                                        

 

             LOCATION: (test code = (NOTE)                                 



             45908)                                              

 

             CPT: (test code = 8140) 71680                                  



COMPREHENSIVE METABOLIC PANEL2020-10-31 00:00:00





             Test Item    Value        Reference Range Interpretation Comments

 

             GLUCOSE (test code = 2217) 89 MG/DL                               

 

             BUN (test code = 2208) 13 MG/DL                               

 

             CREATININE (test code = 2214) 0.66 MG/DL                           

  

 

             eGFR  AMER. (test code 118 ML/MIN/1.73                      

     



             = 13202)                                            

 

             eGFR NON- AMER. (test 102 ML/MIN/1.73                       

    



             code = 56534)                                        

 

             CALC BUN/CREAT (test code = 20 RATIO                               



             2235)                                               

 

             SODIUM (test code = 2231) 140 MEQ/L                              

 

             POTASSIUM (test code = 2228) 4.6 MEQ/L                             

 

 

             CHLORIDE (test code = 2215) 103 MEQ/L                              

 

             CARBON DIOXIDE (test code = 29 MEQ/L                               



             )                                               

 

             CALCIUM (test code = 2209) 9.9 MG/DL                              

 

             PROTEIN, TOTAL (test code = 7.4 G/DL                               



             )                                               

 

             ALBUMIN (test code = 2201) 4.6 G/DL                               

 

             CALC GLOBULIN (test code = 2.8 G/DL                               



             )                                               

 

             CALC A/G RATIO (test code = 1.6 RATIO                              



             )                                               

 

             BILIRUBIN, TOTAL (test code = <0.2 MG/DL                           

  



             )                                               

 

             ALKALINE PHOSPHATASE (test 70 U/L                                 



             code = )                                        

 

             AST (test code = 2218) 17 U/L                                 

 

             ALT (test code = 2219) 18 U/L                                 



LIPID [id number] 00:00:00





             Test Item    Value        Reference Range Interpretation Comments

 

             CHOLESTEROL (test code = 2210) 214 MG/DL                           

   

 

             TRIGLYCERIDES (test code = 2232) 171 MG/DL                         

     

 

             HDL CHOLESTEROL (test code = 2220) 69 MG/DL                        

       

 

             CALC LDL CHOL (test code = 2237) 116 MG/DL                         

     

 

             RISK RATIO LDL/HDL (test code = 1.68 RATIO                         

    



             8)                                               



HEMOGLOBIN [id number] 00:00:00





             Test Item    Value        Reference Range Interpretation Comments

 

             HEMOGLOBIN A1c (test code = 98514) 5.8 %                           

       



HEMOGLOBIN [id number] 00:00:00





             Test Item    Value        Reference Range Interpretation Comments

 

             HEMOGLOBIN A1c (test code = 59581) 5.8 %                           

       



COMPREHENSIVE METABOLIC PANEL2020-10-31 00:00:00





             Test Item    Value        Reference Range Interpretation Comments

 

             GLUCOSE (test code = 2217) 89 MG/DL                               

 

             BUN (test code = 2208) 13 MG/DL                               

 

             CREATININE (test code = 2214) 0.66 MG/DL                           

  

 

             eGFR  AMER. (test code 118 ML/MIN/1.73                      

     



             = 87455)                                            

 

             eGFR NON- AMER. (test 102 ML/MIN/1.73                       

    



             code = 43129)                                        

 

             CALC BUN/CREAT (test code = 20 RATIO                               



             2235)                                               

 

             SODIUM (test code = 2231) 140 MEQ/L                              

 

             POTASSIUM (test code = 2228) 4.6 MEQ/L                             

 

 

             CHLORIDE (test code = 2215) 103 MEQ/L                              

 

             CARBON DIOXIDE (test code = 29 MEQ/L                               



             )                                               

 

             CALCIUM (test code = 220) 9.9 MG/DL                              

 

             PROTEIN, TOTAL (test code = 7.4 G/DL                               



             )                                               

 

             ALBUMIN (test code = 2201) 4.6 G/DL                               

 

             CALC GLOBULIN (test code = 2.8 G/DL                               



             )                                               

 

             CALC A/G RATIO (test code = 1.6 RATIO                              



             2234)                                               

 

             BILIRUBIN, TOTAL (test code = <0.2 MG/DL                           

  



             )                                               

 

             ALKALINE PHOSPHATASE (test 70 U/L                                 



             code = 2204)                                        

 

             AST (test code = 2218) 17 U/L                                 

 

             ALT (test code = 2219) 18 U/L                                 



LIPID PANEL2020-10-31 00:00:00





             Test Item    Value        Reference Range Interpretation Comments

 

             CHOLESTEROL (test code = 2210) 214 MG/DL                           

   

 

             TRIGLYCERIDES (test code = 2232) 171 MG/DL                         

     

 

             HDL CHOLESTEROL (test code = 2220) 69 MG/DL                        

       

 

             CALC LDL CHOL (test code = 223) 116 MG/DL                         

     

 

             RISK RATIO LDL/HDL (test code = 1.68 RATIO                         

    



             8)                                               



HEMOGLOBIN [id number] 00:00:00





             Test Item    Value        Reference Range Interpretation Comments

 

             HEMOGLOBIN A1c (test code = 08238) 5.8 %                           

       



HEMOGLOBIN [id number] 00:00:00





             Test Item    Value        Reference Range Interpretation Comments

 

             HEMOGLOBIN A1c (test code = 55224) 5.8 %                           

       



COMPREHENSIVE METABOLIC PANEL2020-10-31 00:00:00





             Test Item    Value        Reference Range Interpretation Comments

 

             GLUCOSE (test code = 7) 89 MG/DL                               

 

             BUN (test code = ) 13 MG/DL                               

 

             CREATININE (test code = 2214) 0.66 MG/DL                           

  

 

             eGFR  AMER. (test code 118 ML/MIN/1.73                      

     



             = 24068)                                            

 

             eGFR NON- AMER. (test 102 ML/MIN/1.73                       

    



             code = 10730)                                        

 

             CALC BUN/CREAT (test code = 20 RATIO                               



             2235)                                               

 

             SODIUM (test code = 2231) 140 MEQ/L                              

 

             POTASSIUM (test code = 2228) 4.6 MEQ/L                             

 

 

             CHLORIDE (test code = 2215) 103 MEQ/L                              

 

             CARBON DIOXIDE (test code = 29 MEQ/L                               



             )                                               

 

             CALCIUM (test code = 2209) 9.9 MG/DL                              

 

             PROTEIN, TOTAL (test code = 7.4 G/DL                               



             2229)                                               

 

             ALBUMIN (test code = 2201) 4.6 G/DL                               

 

             CALC GLOBULIN (test code = 2.8 G/DL                               



             2240)                                               

 

             CALC A/G RATIO (test code = 1.6 RATIO                              



             2234)                                               

 

             BILIRUBIN, TOTAL (test code = <0.2 MG/DL                           

  



             )                                               

 

             ALKALINE PHOSPHATASE (test 70 U/L                                 



             code = 2204)                                        

 

             AST (test code = 2218) 17 U/L                                 

 

             ALT (test code = 2219) 18 U/L                                 



COMPREHENSIVE METABOLIC PANEL2020-10-31 00:00:00





             Test Item    Value        Reference Range Interpretation Comments

 

             GLUCOSE (test code = 2217) 89 MG/DL                               

 

             BUN (test code = 2208) 13 MG/DL                               

 

             CREATININE (test code = 2214) 0.66 MG/DL                           

  

 

             eGFR  AMER. (test code 118 ML/MIN/1.73                      

     



             = 35846)                                            

 

             eGFR NON- AMER. (test 102 ML/MIN/1.73                       

    



             code = 62196)                                        

 

             CALC BUN/CREAT (test code = 20 RATIO                               



             2235)                                               

 

             SODIUM (test code = 2231) 140 MEQ/L                              

 

             POTASSIUM (test code = 2228) 4.6 MEQ/L                             

 

 

             CHLORIDE (test code = 2215) 103 MEQ/L                              

 

             CARBON DIOXIDE (test code = 29 MEQ/L                               



             2206)                                               

 

             CALCIUM (test code = 2209) 9.9 MG/DL                              

 

             PROTEIN, TOTAL (test code = 7.4 G/DL                               



             )                                               

 

             ALBUMIN (test code = 2201) 4.6 G/DL                               

 

             CALC GLOBULIN (test code = 2.8 G/DL                               



             2240)                                               

 

             CALC A/G RATIO (test code = 1.6 RATIO                              



             2234)                                               

 

             BILIRUBIN, TOTAL (test code = <0.2 MG/DL                           

  



             )                                               

 

             ALKALINE PHOSPHATASE (test 70 U/L                                 



             code = 2204)                                        

 

             AST (test code = 2218) 17 U/L                                 

 

             ALT (test code = 2219) 18 U/L                                 



LIPID PANEL2020-10-31 00:00:00





             Test Item    Value        Reference Range Interpretation Comments

 

             CHOLESTEROL (test code = 2210) 214 MG/DL                           

   

 

             TRIGLYCERIDES (test code = 2232) 171 MG/DL                         

     

 

             HDL CHOLESTEROL (test code = 2220) 69 MG/DL                        

       

 

             CALC LDL CHOL (test code = 2237) 116 MG/DL                         

     

 

             RISK RATIO LDL/HDL (test code = 1.68 RATIO                         

    



             2238)                                               



LIPID PANEL2020-10-31 00:00:00





             Test Item    Value        Reference Range Interpretation Comments

 

             CHOLESTEROL (test code = 2210) 214 MG/DL                           

   

 

             TRIGLYCERIDES (test code = 2232) 171 MG/DL                         

     

 

             HDL CHOLESTEROL (test code = 2220) 69 MG/DL                        

       

 

             CALC LDL CHOL (test code = 2237) 116 MG/DL                         

     

 

             RISK RATIO LDL/HDL (test code = 1.68 RATIO                         

    



             2238)                                               



HEMOGLOBIN A1c2020-10-31 00:00:00





             Test Item    Value        Reference Range Interpretation Comments

 

             HEMOGLOBIN A1c (test code = 85871) 5.8 %                           

       



HEMOGLOBIN A1c2020-10-31 00:00:00





             Test Item    Value        Reference Range Interpretation Comments

 

             HEMOGLOBIN A1c (test code = 55037) 5.8 %                           

       



HEMOGLOBIN A1c2020-10-31 00:00:00





             Test Item    Value        Reference Range Interpretation Comments

 

             HEMOGLOBIN A1c (test code = 29377) 5.8 %                           

       



COMPREHENSIVE METABOLIC PANEL2020-10-31 00:00:00





             Test Item    Value        Reference Range Interpretation Comments

 

             GLUCOSE (test code = 2217) 89 MG/DL                               

 

             BUN (test code = 2208) 13 MG/DL                               

 

             CREATININE (test code = 2214) 0.66 MG/DL                           

  

 

             eGFR  AMER. (test code 118 ML/MIN/1.73                      

     



             = 36932)                                            

 

             eGFR NON- AMER. (test 102 ML/MIN/1.73                       

    



             code = 50815)                                        

 

             CALC BUN/CREAT (test code = 20 RATIO                               



             2235)                                               

 

             SODIUM (test code = 2231) 140 MEQ/L                              

 

             POTASSIUM (test code = 2228) 4.6 MEQ/L                             

 

 

             CHLORIDE (test code = 2215) 103 MEQ/L                              

 

             CARBON DIOXIDE (test code = 29 MEQ/L                               



             )                                               

 

             CALCIUM (test code = 2209) 9.9 MG/DL                              

 

             PROTEIN, TOTAL (test code = 7.4 G/DL                               



             )                                               

 

             ALBUMIN (test code = 2201) 4.6 G/DL                               

 

             CALC GLOBULIN (test code = 2.8 G/DL                               



             2240)                                               

 

             CALC A/G RATIO (test code = 1.6 RATIO                              



             2234)                                               

 

             BILIRUBIN, TOTAL (test code = <0.2 MG/DL                           

  



             )                                               

 

             ALKALINE PHOSPHATASE (test 70 U/L                                 



             code = 2204)                                        

 

             AST (test code = 2218) 17 U/L                                 

 

             ALT (test code = 2219) 18 U/L                                 



COMPREHENSIVE METABOLIC PANEL2020-10-31 00:00:00





             Test Item    Value        Reference Range Interpretation Comments

 

             GLUCOSE (test code = 2217) 89 MG/DL                               

 

             BUN (test code = 2208) 13 MG/DL                               

 

             CREATININE (test code = 2214) 0.66 MG/DL                           

  

 

             eGFR  AMER. (test code 118 ML/MIN/1.73                      

     



             = 23974)                                            

 

             eGFR NON- AMER. (test 102 ML/MIN/1.73                       

    



             code = 94154)                                        

 

             CALC BUN/CREAT (test code = 20 RATIO                               



             2235)                                               

 

             SODIUM (test code = 2231) 140 MEQ/L                              

 

             POTASSIUM (test code = 2228) 4.6 MEQ/L                             

 

 

             CHLORIDE (test code = 2215) 103 MEQ/L                              

 

             CARBON DIOXIDE (test code = 29 MEQ/L                               



             )                                               

 

             CALCIUM (test code = 2209) 9.9 MG/DL                              

 

             PROTEIN, TOTAL (test code = 7.4 G/DL                               



             )                                               

 

             ALBUMIN (test code = 220) 4.6 G/DL                               

 

             CALC GLOBULIN (test code = 2.8 G/DL                               



             )                                               

 

             CALC A/G RATIO (test code = 1.6 RATIO                              



             2234)                                               

 

             BILIRUBIN, TOTAL (test code = <0.2 MG/DL                           

  



             )                                               

 

             ALKALINE PHOSPHATASE (test 70 U/L                                 



             code = 2204)                                        

 

             AST (test code = 2218) 17 U/L                                 

 

             ALT (test code = 2219) 18 U/L                                 



LIPID PANEL2020-10-31 00:00:00





             Test Item    Value        Reference Range Interpretation Comments

 

             CHOLESTEROL (test code = 2210) 214 MG/DL                           

   

 

             TRIGLYCERIDES (test code = 2232) 171 MG/DL                         

     

 

             HDL CHOLESTEROL (test code = 2220) 69 MG/DL                        

       

 

             CALC LDL CHOL (test code = 2237) 116 MG/DL                         

     

 

             RISK RATIO LDL/HDL (test code = 1.68 RATIO                         

    



             2238)                                               



LIPID PANEL2020-10-31 00:00:00





             Test Item    Value        Reference Range Interpretation Comments

 

             CHOLESTEROL (test code = 2210) 214 MG/DL                           

   

 

             TRIGLYCERIDES (test code = 2232) 171 MG/DL                         

     

 

             HDL CHOLESTEROL (test code = 2220) 69 MG/DL                        

       

 

             CALC LDL CHOL (test code = 2237) 116 MG/DL                         

     

 

             RISK RATIO LDL/HDL (test code = 1.68 RATIO                         

    



             2238)                                               



HEMOGLOBIN A1c2020-10-31 00:00:00





             Test Item    Value        Reference Range Interpretation Comments

 

             HEMOGLOBIN A1c (test code = 24452) 5.8 %                           

       



HEMOGLOBIN A1c2020-10-31 00:00:00





             Test Item    Value        Reference Range Interpretation Comments

 

             HEMOGLOBIN A1c (test code = 15616) 5.8 %                           

       



HEMOGLOBIN A1c2020-10-31 00:00:00





             Test Item    Value        Reference Range Interpretation Comments

 

             HEMOGLOBIN A1c (test code = 17085) 5.8 %                           

       



HPV HIGH RISK WITH GENOTYPE, [id number] 00:00:00





             Test Item    Value        Reference Range Interpretation Comments

 

             HPV HIGH RISK INTERP (test code = NEGATIVE                         

      



             48625)                                              

 

             HPV 16 (test code = 42601) NEGATIVE                               

 

             HPV 18 (test code = 46964) NEGATIVE                               

 

             HPV, HR, OTHER GENOTYPES (test code NEGATIVE                       

        



             = 92802)                                            



HPV HIGH RISK WITH GENOTYPE, [id number] 00:00:00





             Test Item    Value        Reference Range Interpretation Comments

 

             HPV HIGH RISK INTERP (test code = NEGATIVE                         

      



             99731)                                              

 

             HPV 16 (test code = 62100) NEGATIVE                               

 

             HPV 18 (test code = 23009) NEGATIVE                               

 

             HPV, HR, OTHER GENOTYPES (test code NEGATIVE                       

        



             = 00397)                                            



HPV HIGH RISK WITH GENOTYPE, [id number] 00:00:00





             Test Item    Value        Reference Range Interpretation Comments

 

             HPV HIGH RISK INTERP (test code = NEGATIVE                         

      



             71266)                                              

 

             HPV 16 (test code = 87275) NEGATIVE                               

 

             HPV 18 (test code = 89392) NEGATIVE                               

 

             HPV, HR, OTHER GENOTYPES (test code NEGATIVE                       

        



             = 24427)                                            



HPV HIGH RISK WITH GENOTYPE, [id number] 00:00:00





             Test Item    Value        Reference Range Interpretation Comments

 

             HPV HIGH RISK INTERP (test code = NEGATIVE                         

      



             56816)                                              

 

             HPV 16 (test code = 56258) NEGATIVE                               

 

             HPV 18 (test code = 40188) NEGATIVE                               

 

             HPV, HR, OTHER GENOTYPES (test code NEGATIVE                       

        



             = 61572)                                            



HPV HIGH RISK WITH GENOTYPE, [id number] 00:00:00





             Test Item    Value        Reference Range Interpretation Comments

 

             HPV HIGH RISK INTERP (test code = NEGATIVE                         

      



             65765)                                              

 

             HPV 16 (test code = 88961) NEGATIVE                               

 

             HPV 18 (test code = 72284) NEGATIVE                               

 

             HPV, HR, OTHER GENOTYPES (test code NEGATIVE                       

        



             = 92932)                                            



HPV HIGH RISK WITH GENOTYPE, [id number] 00:00:00





             Test Item    Value        Reference Range Interpretation Comments

 

             HPV HIGH RISK INTERP (test code = NEGATIVE                         

      



             23723)                                              

 

             HPV 16 (test code = 61087) NEGATIVE                               

 

             HPV 18 (test code = 77268) NEGATIVE                               

 

             HPV, HR, OTHER GENOTYPES (test code NEGATIVE                       

        



             = 21632)                                            



VAGINAL PATHOGENS DNA PANEL2020-10-29 00:00:00





             Test Item    Value        Reference Range Interpretation Comments

 

             CANDIDA SPECIES (test code = 61026) NEGATIVE                       

        

 

             G. VAGINALIS (test code = 45774) POSITIVE                          

     

 

             T. VAGINALIS (test code = 14370) NEGATIVE                          

     



VAGINAL PATHOGENS DNA PANEL2020-10-29 00:00:00





             Test Item    Value        Reference Range Interpretation Comments

 

             CANDIDA SPECIES (test code = 75561) NEGATIVE                       

        

 

             G. VAGINALIS (test code = 09645) POSITIVE                          

     

 

             T. VAGINALIS (test code = 83570) NEGATIVE                          

     



VAGINAL PATHOGENS DNA PANEL2020-10-29 00:00:00





             Test Item    Value        Reference Range Interpretation Comments

 

             CANDIDA SPECIES (test code = 82860) NEGATIVE                       

        

 

             G. VAGINALIS (test code = 68667) POSITIVE                          

     

 

             T. VAGINALIS (test code = 98733) NEGATIVE                          

     



VAGINAL PATHOGENS DNA PANEL2020-10-29 00:00:00





             Test Item    Value        Reference Range Interpretation Comments

 

             CANDIDA SPECIES (test code = 67172) NEGATIVE                       

        

 

             G. VAGINALIS (test code = 97463) POSITIVE                          

     

 

             T. VAGINALIS (test code = 59304) NEGATIVE                          

     



VAGINAL PATHOGENS DNA PANEL2020-10-29 00:00:00





             Test Item    Value        Reference Range Interpretation Comments

 

             CANDIDA SPECIES (test code = 35861) NEGATIVE                       

        

 

             G. VAGINALIS (test code = 40878) POSITIVE                          

     

 

             T. VAGINALIS (test code = 84092) NEGATIVE                          

     



VAGINAL PATHOGENS DNA PANEL2020-10-29 00:00:00





             Test Item    Value        Reference Range Interpretation Comments

 

             CANDIDA SPECIES (test code = 51506) NEGATIVE                       

        

 

             G. VAGINALIS (test code = 68409) POSITIVE                          

     

 

             T. VAGINALIS (test code = 15061) NEGATIVE                          

     







Notes







                Date/Time       Note            Provider        Source

 

                    2023          Formatting of this note is different fro

m the original. Rubina Holman [facility]



                07:37:38-00:00  Images from the original note were not included.

                 



                                Refilled per cardiology protocol                

 



                                 Name from pharmacy: SPIRONOLACTONE 25 MG TABLET

                 



                                                                



                                 Will file in chart as: SPIRONOLACTONE 25 mg tab

let                 



                                 Sig: TAKE HALF TABLET BY MOUTH EVERY MORNING   

              



                                 Disp: 15 tablet ? Refills: Not specified       

          



                                 Start: 8/3/2023                 



                                 Class: eRX                     



                                 Last ordered: 2 months ago (2023) by No Nam

e Doctor Unassigned                 



                                 Last refill: 7/3/2023                 



                                 Rx #: [id number]                  



                                 Cardiovascular: ?Diuretics - Potassium Sparing 

Passed 2023 05:10 AM                 



                                Protocol Details Valid encounter within last 12 

months                 



                                 K in normal range and within 180 days          

       



                                 Cr in normal range and within 180 days         

        



                                                                



                                                                



                                                                



                                Electronically signed by Rubina Holman MA a t 2023 7:37 AM CDT

## 2023-08-06 NOTE — RAD REPORT
EXAM DESCRIPTION:  [facility] Single View8/6/2023 7:48 pm

 

CLINICAL HISTORY:  Cough

 

COMPARISON:  2021

 

FINDINGS:   The lungs appear clear of acute infiltrate. The heart is normal size

 

IMPRESSION:  No acute abnormalities displayed

## 2023-08-06 NOTE — EDPHYS
Physician Documentation                                                                           

 [facility]                                                                 

Name: Emmanuelle Cool                                                                              

Age: 53 yrs                                                                                       

Sex: Female                                                                                       

: 1970                                                                                   

MRN: [id number]                                                                                   

Arrival Date: 2023                                                                          

Time: 18:45                                                                                       

Account#: [id number]                                                                            

Bed IW1                                                                                           

Private MD:                                                                                       

ED Physician Kurtis Croft                                                                     

HPI:                                                                                              

                                                                                             

20:03 This 53 yrs old  Female presents to ER via Ambulatory with complaints of Flu    kb  

      Symptoms.                                                                                   

20:03 The patient presents with sore throat. The patient describes throat pain as constant.   kb  

      Onset: The symptoms/episode began/occurred this morning. Severity of symptoms: At their     

      worst the symptoms were moderate, in the emergency department the symptoms are              

      unchanged. Modifying factors: The symptoms are alleviated by nothing, the symptoms are      

      aggravated by swallowing, Patient's oral intake status: good. Associated signs and          

      symptoms: Pertinent positives: chills, cough, fever, Sore throat. The patient has not       

      experienced similar symptoms in the past. The patient has not recently seen a physician.    

                                                                                                  

Historical:                                                                                       

- Allergies:                                                                                      

18:53 Amoxicillin;                                                                            mb9 

- Home Meds:                                                                                      

18:53 spironolactone Oral [Active]; Lisinopril Oral [Active];                                 mb9 

- PMHx:                                                                                           

18:53 Hypertensive disorder; Anxiety;                                                         mb9 

- PSHx:                                                                                           

18:53 None;                                                                                   mb9 

                                                                                                  

- Immunization history:: Adult Immunizations up to date.                                          

- Social history:: Smoking status: Patient denies any tobacco usage or history of.                

                                                                                                  

                                                                                                  

ROS:                                                                                              

20:01 Constitutional: Positive for body aches, chills, fatigue, fever, malaise.               kb  

20:01 ENT: Positive for sore throat.                                                              

20:01 Respiratory: Positive for cough.                                                            

20:01 All other systems are negative.                                                             

20:05 Abdomen/GI: Negative for abdominal pain, nausea, vomiting, diarrhea, and constipation.  kb  

                                                                                                  

Exam:                                                                                             

20:02 Constitutional:  This is a well developed, well nourished patient who is awake, alert,  kb  

      and in no acute distress. Head/Face:  Normocephalic, atraumatic. Cardiovascular:            

      Regular rate and rhythm with a normal S1 and S2.  No gallops, murmurs, or rubs.  No         

      pulse deficits. Respiratory:  Respirations even and unlabored. No increased work of         

      breathing. Talking in full sentences Abdomen/GI:  Soft, non-tender. No distention Skin:     

       Warm, dry with normal turgor.  Normal color. MS/ Extremity:  Pulses equal, no              

      cyanosis.  Neurovascular intact.  Full, normal range of motion. Neuro:  Awake and           

      alert, GCS 15, oriented to person, place, time, and situation. Moves all extremities.       

      Normal gait.                                                                                

20:02 ENT: Posterior pharynx: Airway: normal, no evidence of obstruction, Tonsils:                

      bilaterally enlarged, with erythema, with exudate, Uvula: normal, midline, swelling,        

      that is moderate, erythema, that is moderate, exudate, that is mild.                        

                                                                                                  

Vital Signs:                                                                                      

18:51  / 77; Pulse 86; Resp 18; Temp 100.7(O); Pulse Ox 99% on R/A; Weight 72.57 kg;    mb9 

      Height 4 ft. 11 in. ; Pain 10/10;                                                           

18:51 Body Mass Index 32.32 (72.57 kg, 149.86 cm)                                             mb9 

18:51 Pain Scale: Adult                                                                       mb9 

                                                                                                  

MDM:                                                                                              

18:49 Patient medically screened.                                                             kb  

20:01 Differential diagnosis: Flu, COVID, strep, URI. Data reviewed: vital signs, nurses      kb  

      notes. Counseling: I had a detailed discussion with the patient and/or guardian             

      regarding: the historical points, exam findings, and any diagnostic results supporting      

      the discharge/admit diagnosis, lab results, radiology results, the need for outpatient      

      follow up, a family practitioner, to return to the emergency department if symptoms         

      worsen or persist or if there are any questions or concerns that arise at home.             

                                                                                                  

                                                                                             

18:52 Order name: Flu; Complete Time: 20:00                                                   kb  

                                                                                             

18:52 Order name: Strep; Complete Time: 20:00                                                 kb  

                                                                                             

19:32 Order name: SARS-COV-2 Antigen Rapid; Complete Time: 19:47                              EDMS

                                                                                             

18:52 Order name: Chest Single View XRAY; Complete Time: 20:08                                kb  

                                                                                                  

Administered Medications:                                                                         

20:34 Drug: AZITHromycin  mg Route: PO;                                                 iw  

21:00 Follow up: Response: No adverse reaction                                                iw  

20:34 Drug: Ibuprofen  mg Route: PO;                                                    iw  

21:00 Follow up: Response: No adverse reaction                                                iw  

                                                                                                  

                                                                                                  

Disposition Summary:                                                                              

23 20:04                                                                                    

Discharge Ordered                                                                                 

      Location: Home                                                                          kb  

      Condition: Stable                                                                       kb  

      Diagnosis                                                                                   

        - Streptococcal pharyngitis                                                           kb  

      Followup:                                                                               kb  

        - With: Emergency Department                                                               

        - When: As needed                                                                          

        - Reason: Worsening of condition                                                           

      Followup:                                                                               kb  

        - With: Private Physician                                                                  

        - When: 2 - 3 days                                                                         

        - Reason: Recheck today's complaints, Continuance of care, Re-evaluation by your           

      physician                                                                                   

      Discharge Instructions:                                                                     

        - Discharge Summary Sheet                                                             kb  

        - Strep Throat, Adult, Easy-to-Read                                                   kb  

      Forms:                                                                                      

        - Medication Reconciliation Form                                                      kb  

        - Thank You Letter                                                                    kb  

        - Antibiotic Education                                                                kb  

        - Prescription Opioid Use                                                             kb  

        - Patient Portal Instructions                                                         kb  

      Prescriptions:                                                                              

        - Zithromax 500 mg Oral Tablet                                                             

            - take 1 tablet by ORAL route once daily for 5 days; 5 tablet; Refills: 0,        kb  

      Product Selection Permitted                                                                 

Signatures:                                                                                       

Dispatcher [employer]                           Annabella Shultz, KASSYC                 ROBLES-Maddi Mauricio RN                     RN                                                      

Nasreen Walker RN                 RN   mb9                                                  

                                                                                                  

Corrections: (The following items were deleted from the chart)                                    

19:30 18:53 SARS-COV-2 RT PCR+[id number] ordered. EDMS                                       EDMS

                                                                                                  

**************************************************************************************************

## 2023-08-06 NOTE — ER
Nurse's Notes                                                                                     

 [facility]                                                                 

Name: Emmanuelle Cool                                                                              

Age: 53 yrs                                                                                       

Sex: Female                                                                                       

: 1970                                                                                   

MRN: [id number]                                                                                   

Arrival Date: 2023                                                                          

Time: 18:45                                                                                       

Account#: [id number]                                                                            

Bed IW1                                                                                           

Private MD:                                                                                       

Diagnosis: Streptococcal pharyngitis                                                              

                                                                                                  

Presentation:                                                                                     

                                                                                             

18:51 Chief complaint: Patient states: "I have a fever, sore throat, and cough that started   mb9 

      last night.". Chief complaint:. Coronavirus screen: Vaccine status: Patient reports         

      receiving the 2nd dose of the covid vaccine. Ebola Screen: No symptoms or risks             

      identified at this time. Initial Sepsis Screen: Does the patient meet any 2 criteria?       

      No. Patient's initial sepsis screen is negative. Does the patient have a suspected          

      source of infection? No. Patient's initial sepsis screen is negative. Risk Assessment:      

      Do you want to hurt yourself or someone else? Patient reports no desire to harm self or     

      others. Onset of symptoms was 2023.                                              

18:51 Acuity: PABLO 4                                                                           mb9 

18:51 Method Of Arrival: Ambulatory                                                           mb9 

                                                                                                  

Triage Assessment:                                                                                

18:55 General: Appears in no apparent distress. Behavior is anxious. Pain: Denies pain.       mb9 

      Neuro: [place] Agitation-Sedation Scale (RASS): 0 - Alert and Calm Level of                

      Consciousness is awake, alert, obeys commands, Oriented to person, place, time,             

      situation, Appropriate for age. Cardiovascular: Patient's skin is warm and dry.             

      Respiratory: Reports cough that is Airway is patent Respiratory effort is even,             

      unlabored, Respiratory pattern is regular, symmetrical. GI: Patient currently denies        

      diarrhea, nausea. Derm: Skin is pink, warm \T\ dry. Musculoskeletal: Range of motion:       

      intact in all extremities.                                                                  

                                                                                                  

Historical:                                                                                       

- Allergies:                                                                                      

18:53 Amoxicillin;                                                                            mb9 

- Home Meds:                                                                                      

18:53 spironolactone Oral [Active]; Lisinopril Oral [Active];                                 mb9 

- PMHx:                                                                                           

18:53 Hypertensive disorder; Anxiety;                                                         mb9 

- PSHx:                                                                                           

18:53 None;                                                                                   mb9 

                                                                                                  

- Immunization history:: Adult Immunizations up to date.                                          

- Social history:: Smoking status: Patient denies any tobacco usage or history of.                

                                                                                                  

                                                                                                  

Assessment:                                                                                       

20:30 Reassessment: pt was seen by ERNP in triage and d/c from [facility].                         iw  

                                                                                                  

Vital Signs:                                                                                      

18:51  / 77; Pulse 86; Resp 18; Temp 100.7(O); Pulse Ox 99% on R/A; Weight 72.57 kg;    mb9 

      Height 4 ft. 11 in. ; Pain 10/10;                                                           

18:51 Body Mass Index 32.32 (72.57 kg, 149.86 cm)                                             mb9 

18:51 Pain Scale: Adult                                                                       mb9 

                                                                                                  

ED Course:                                                                                        

18:46 Patient arrived in ED.                                                                  im  

18:49 Annabella Hammer FNP-C is PHCP.                                                        kb  

18:49 Kurtis Croft MD is Attending Physician.                                            kb  

18:53 Triage completed.                                                                       mb9 

18:53 Arm band placed on.                                                                     mb9 

19:50 Chest Single View XRAY In Process Unspecified.                                          EDMS

20:29 Maddi John, RN is Primary Nurse.                                                   iw  

                                                                                                  

Administered Medications:                                                                         

20:34 Drug: AZITHromycin  mg Route: PO;                                                 iw  

21:00 Follow up: Response: No adverse reaction                                                iw  

20:34 Drug: Ibuprofen  mg Route: PO;                                                    iw  

21:00 Follow up: Response: No adverse reaction                                                iw  

                                                                                                  

                                                                                                  

Outcome:                                                                                          

20:04 Discharge ordered by MD.                                                                kb  

20:33 Discharged to home via wheelchair.                                                      iw  

20:33 Condition: good                                                                             

20:33 Discharge instructions given to patient, Instructed on discharge instructions, follow       

      up and referral plans. Demonstrated understanding of instructions, follow-up care,          

      medications, Prescriptions given X 1.                                                       

20:34 Patient left the ED.                                                                    iw  

                                                                                                  

Signatures:                                                                                       

Dispatcher [employer]                           EDMS                                                 

Annabella Hammer FNP-C FNP-Ckb Williams, Irene RN                     BRYSON                                                      

Nasreen Walker RN                 RN   mb9                                                  

Soina García                               im                                                   

                                                                                                  

Corrections: (The following items were deleted from the chart)                                    

                                                                                             

17:29 08 20:30 Reassessment: pt was seen by MICHAEL in triage and d/d from [facility]           iw  

                                                                                                  

**************************************************************************************************